# Patient Record
Sex: MALE | Race: WHITE | Employment: OTHER | URBAN - METROPOLITAN AREA
[De-identification: names, ages, dates, MRNs, and addresses within clinical notes are randomized per-mention and may not be internally consistent; named-entity substitution may affect disease eponyms.]

---

## 2017-02-14 ENCOUNTER — HOSPITAL ENCOUNTER (OUTPATIENT)
Dept: CT IMAGING | Age: 82
Discharge: HOME | End: 2017-02-14
Attending: FAMILY MEDICINE
Payer: MEDICARE

## 2017-02-14 DIAGNOSIS — R51.9 HA (HEADACHE): ICD-10-CM

## 2017-02-14 PROCEDURE — 70450 CT HEAD/BRAIN W/O DYE: CPT

## 2017-09-29 ENCOUNTER — HOSPITAL ENCOUNTER (OUTPATIENT)
Dept: PHYSICAL THERAPY | Age: 82
Discharge: HOME | End: 2017-09-29
Payer: MEDICARE

## 2017-09-29 PROCEDURE — 97161 PT EVAL LOW COMPLEX 20 MIN: CPT | Performed by: PHYSICAL THERAPIST

## 2017-09-29 PROCEDURE — G8978 MOBILITY CURRENT STATUS: HCPCS | Performed by: PHYSICAL THERAPIST

## 2017-09-29 PROCEDURE — 97110 THERAPEUTIC EXERCISES: CPT | Performed by: PHYSICAL THERAPIST

## 2017-09-29 PROCEDURE — G8979 MOBILITY GOAL STATUS: HCPCS | Performed by: PHYSICAL THERAPIST

## 2017-09-29 NOTE — PROGRESS NOTES
[facility], [address] [place] [address]  [phone] fax  Plan of Care/ Statement of Necessity for Physical Therapy Services    Patient name: Yessi Steele. Start of Care: 2017   Referral source: Laverne Silva MD : 1934    Medical Diagnosis: Pain in left hip [M25.552]  Bursitis of left hip [M70.72]   Onset Date:2 months    Treatment Diagnosis: L hip pain   Prior Hospitalization: see medical history Provider#: [id number]   Medications: Verified on Patient summary List    Comorbidities: a-fib, pacemaker, hearing impaired, OA, hx of frontal and parietal lobe clots   Prior Level of Function: independent ADLs, digs ditches, yard work, etc.      The Plan of Care and following information is based on the information from the initial evaluation. Assessment/ key information: Patient presents with signs/symptoms of L hip abductor tendinopathy and bursitis. Patient will continue to benefit from skilled PT services to modify and progress therapeutic interventions, address functional mobility deficits, address ROM deficits, address strength deficits, analyze and address soft tissue restrictions, analyze and cue movement patterns and analyze and modify body mechanics/ergonomics to attain remaining goals.     Evaluation Complexity History MEDIUM  Complexity : 1-2 comorbidities / personal factors will impact the outcome/ POC ; Examination LOW Complexity : 1-2 Standardized tests and measures addressing body structure, function, activity limitation and / or participation in recreation  ;Presentation LOW Complexity : Stable, uncomplicated  ;Clinical Decision Making MEDIUM Complexity : FOTO score of 26-74  Overall Complexity Rating: LOW   Problem List: pain affecting function, decrease ROM, decrease strength, impaired gait/ balance, decrease ADL/ functional abilitiies, decrease activity tolerance, decrease flexibility/ joint mobility and decrease transfer abilities   Treatment Plan may include any combination of the following: Therapeutic exercise, Therapeutic activities, Neuromuscular re-education, Physical agent/modality, Gait/balance training, Manual therapy, Aquatic therapy, Patient education, Self Care training, Functional mobility training, Home safety training and Stair training  Patient / Family readiness to learn indicated by: asking questions, trying to perform skills and interest  Persons(s) to be included in education: patient (P)  Barriers to Learning/Limitations: None  Patient Goal (s): \"strengthen hip to overcome pain  Patient Self Reported Health Status: good  Rehabilitation Potential: good    Short Term Goals: To be accomplished in 2 weeks:                         Patient will report compliance with HEP at least 1x/day to aid in rehabilitation program.                         Patient will report decrease in pain of less than 5/10 to aid in completion of ADLs     Long Term Goals: To be accomplished in 4 weeks:                         Patient will increase strength to 5/5 throughout B LEs to aid in completion of ADLs. Patient will report pain less than 1-2/10 average to aid in completion of ADLs. Patient ambulation for 10mins on level surface with no AD and pain free. Patient will improve FOTO to TBD points overall to demonstrate improvement in functional ability. Frequency / Duration: Patient to be seen 2 times per week for 4-6 weeks. Patient/ Caregiver education and instruction: Diagnosis, prognosis, self care, activity modification and exercises   [x]  Plan of care has been reviewed with PTA    G-Codes (GP)  Mobility   Current  CL= 60-79%   Goal  CK= 40-59%     The severity rating is based on clinical judgment and the FOTO score.     Certification Period: 9/28/2017 - 11/10/2017  Hector Aranda PT, DPT 9/29/2017 1:25 PM  _____________________________________________________________________  I certify that the above Therapy Services are being furnished while the patient is under my care. I agree with the treatment plan and certify that this therapy is necessary.     Physician's Signature:____________________  Date:__________Time:______    Please sign and return to [facility]  [phone] fax

## 2017-09-29 NOTE — PROGRESS NOTES
PT DAILY TREATMENT NOTE/HIP EVAL3-16    Patient Name: Izabela Gibbons. Date:2017  : 1934  [x]  Patient  Verified  Payor: James Perez / Plan: [place] MED[place] PART A & B / Product Type: Medicare /    In time:10:10  Out time:11:05  Total Treatment Time (min): 55  Total Timed Codes (min): 25  1:1 Treatment Time ( [address] only): 30   Visit #: 1 of 8    Treatment Area: Pain in left hip [M25.552]  Bursitis of left hip [M70.72]    SUBJECTIVE  Pain Level (0-10 scale): 5  []constant []intermittent []improving []worsening []no change since onset    Any medication changes, allergies to medications, adverse drug reactions, diagnosis change, or new procedure performed?: [x] No    [] Yes (see summary sheet for update)  Subjective functional status/changes:     Patient has CC of L hip pain for 2 months length of time. RENNY: none. Patient describes pain as sharp, intense; intermittent. No diurnal features. Denies numbness/tingling. Denies popping/clicking. Aggravating factors: walking, twisting, . Alleviating factors: sitting (sometimes). Denies red flags: SOB, chest pain, dizziness/lightheadedness, blurred/double vision, HA, chills/fevers, night sweats, change in bowel/bladder control, abdominal pain, difficulty swallowing, slurred speech, unexplained weight gain/loss. PMHx: a-fib, cerebral clots, pacemaker (10yrs), . Surgical Hx: microcraniotomy. Social Hx: 2 level home (does not need access), denies: alcohol & tobacco, work status: retired. PLOF: independent ADLs, digs ditches, yard work, etc. CLOF: same.        OBJECTIVE/EXAMINATION    30 min [x]Eval                  []Re-Eval       25 min Therapeutic Exercise:  [x] See flow sheet :   Rationale: increase ROM, increase strength and decrease pain to improve the patients ability to complete ADLs       With   [x] TE   [] TA   [] neuro   [] other: Patient Education: [x] Review HEP    [] Progressed/Changed HEP based on:   [] positioning   [] body mechanics   [] transfers [] heat/ice application    [] other:        Physical Therapy Evaluation- Hip    Posture:    Gait:  [] Normal    [x] Abnormal    [] Antalgic    [] NWB    Device:    Describe:         ROM/Strength        AROM                     PROM        Strength (1-5)  Hip Left Right Left Right Left Right   Flexion 0-120 0-120   4 4+   Extension 10 10   4 4+   Abduction 45 45   4 4   ER 45 45   4 4   IR 45 45   4 4   Knee Left Right Left Right Left Right   Extension     5 5   Flexion     5 5        Flexibility: [] Unable to assess at this time  Quadriceps:    (L) Tightness= [] WNL   [x] Min   [] Mod   [] Severe    (R) Tightness= [] WNL   [x] Min   [] Mod   [] Severe                                  Palpation  [x] Min  [] Mod  [] Severe    Location: greater trochanter   [] Min  [] Mod  [] Severe    Location:  [] Min  [] Mod  [] Severe    Location:    Optional Tests  Octavio/ Aleta Test: [x] Neg    [] Pos  Dat Test:  [x] Neg    [] Pos  Scouring Test: [x] Neg    [] Pos    Other tests/ comments: Pain decreased with abduction isometrics 30\" x4       Pain Level (0-10 scale) post treatment: 2    ASSESSMENT/Changes in Function: Patient presents with signs/symptoms of L hip abductor tendinopathy and bursitis. Patient will continue to benefit from skilled PT services to modify and progress therapeutic interventions, address functional mobility deficits, address ROM deficits, address strength deficits, analyze and address soft tissue restrictions, analyze and cue movement patterns and analyze and modify body mechanics/ergonomics to attain remaining goals.      [x]  See Plan of Care  []  See progress note/recertification  []  See Discharge Summary         Progress towards goals / Updated goals:  See POC    PLAN  []  Upgrade activities as tolerated     [x]  Continue plan of care  []  Update interventions per flow sheet       []  Discharge due to:_  []  Other:_      Tory Finley PT, DPT 9/29/2017  10:12 AM

## 2017-10-02 ENCOUNTER — HOSPITAL ENCOUNTER (OUTPATIENT)
Dept: PHYSICAL THERAPY | Age: 82
Discharge: HOME | End: 2017-10-02
Payer: MEDICARE

## 2017-10-02 PROCEDURE — 97110 THERAPEUTIC EXERCISES: CPT

## 2017-10-02 NOTE — PROGRESS NOTES
PT DAILY TREATMENT NOTE - [facility]     Patient Name: Mk Heredia. Date:10/2/2017  : 1934  [x]  Patient  Verified  Payor: Hilary Rios / Plan: [place] MEDICARE PART A & B / Product Type: Medicare /    In time:9:00  Out time:9:40  Total Treatment Time (min): 40  Total Timed Codes (min): 40  1:1 Treatment Time ( only): 40   Visit #: 2 of 8    Treatment Area: Pain in left hip [M25.552]  Bursitis of left hip [M70.72]    SUBJECTIVE  Pain Level (0-10 scale): 1/10  Any medication changes, allergies to medications, adverse drug reactions, diagnosis change, or new procedure performed?: [x] No    [] Yes (see summary sheet for update)  Subjective functional status/changes:   [] No changes reported  \"I'm doing good. \"    OBJECTIVE    40 min Therapeutic Exercise:  [] See flow sheet :   Rationale: increase ROM, increase strength, improve coordination and improve balance to improve the patients ability to perform ADls without difficulty. With   [] TE   [] TA   [] neuro   [] other: Patient Education: [x] Review HEP    [] Progressed/Changed HEP based on:   [] positioning   [] body mechanics   [] transfers   [] heat/ice application    [] other:      Other Objective/Functional Measures:      Pain Level (0-10 scale) post treatment: 0/10    ASSESSMENT/Changes in Function: Initiated ex. Per flow sheet. Pt reported feeling weakness with ex. But was able to perform all ex. Without increased pain or discomfort. Patient will continue to benefit from skilled PT services to modify and progress therapeutic interventions, address functional mobility deficits, address ROM deficits, address strength deficits, analyze and address soft tissue restrictions and analyze and cue movement patterns to attain remaining goals.      []  See Plan of Care  []  See progress note/recertification  []  See Discharge Summary         Progress towards goals / Updated goals:  Short Term Goals: To be accomplished in 2 weeks:                         AMMMIVC will report compliance with HEP at least 1x/day to aid in rehabilitation program.    Eval: Established    Current: MET. Pt reports compliance.                         Patient will report decrease in pain of less than 5/10 to aid in completion of ADLs      Long Term Goals: To be accomplished in 4 weeks:                         HRGCXIZ will increase strength to 5/5 throughout B LEs to aid in completion of ADLs.                        ORPTOCS will report pain less than 1-2/10 average to aid in completion of ADLs.                        LLETARU ambulation for 10mins on level surface with no AD and pain free.                         Patient will improve FOTO to TBD points overall to demonstrate improvement in functional ability.      PLAN  [x]  Upgrade activities as tolerated     [x]  Continue plan of care  []  Update interventions per flow sheet       []  Discharge due to:_  []  Other:_      Tamica Lagos, PTA 10/2/2017  9:06 AM    Future Appointments  Date Time Provider Joshua Carrera   10/4/2017 8:00 AM Tisha Hansen, PT [facility]   10/9/2017 9:00 AM Tisha Hansen, PT [facility]   10/11/2017 8:30 AM [facility]   10/16/2017 9:00 AM [facility]   10/18/2017 9:00 AM [facility]   10/23/2017 9:30 AM Tisha Hansen, PT [facility]

## 2017-10-04 ENCOUNTER — HOSPITAL ENCOUNTER (OUTPATIENT)
Dept: PHYSICAL THERAPY | Age: 82
Discharge: HOME | End: 2017-10-04
Payer: MEDICARE

## 2017-10-04 PROCEDURE — 97110 THERAPEUTIC EXERCISES: CPT

## 2017-10-04 PROCEDURE — 97112 NEUROMUSCULAR REEDUCATION: CPT

## 2017-10-04 NOTE — PROGRESS NOTES
PT DAILY TREATMENT NOTE - [facility]     Patient Name: Katie Crystal. Date:10/4/2017  : 1934  [x]  Patient  Verified  Payor: [place] MEDICARE / Plan: [place] MEDICARE PART A & B / Product Type: Medicare /    In time:800  Out time:835  Total Treatment Time (min): 35  Total Timed Codes (min): 35  1:1 Treatment Time ( only): 30   Visit #: 3 of 8    Treatment Area: Pain in left hip [M25.552]  Bursitis of left hip [M70.72]    SUBJECTIVE  Pain Level (0-10 scale): 3  Any medication changes, allergies to medications, adverse drug reactions, diagnosis change, or new procedure performed?: [x] No    [] Yes (see summary sheet for update)  Subjective functional status/changes:   [] No changes reported  I don't know, my back didn't hurt last time but it did after.      OBJECTIVE    Modality rationale:    Min Type Additional Details    [] Estim:  []Unatt       []IFC  []Premod                        []Other:  []w/ice   []w/heat  Position:  Location:    [] Estim: []Att    []TENS instruct  []NMES                    []Other:  []w/US   []w/ice   []w/heat  Position:  Location:    []  Traction: [] Cervical       []Lumbar                       [] Prone          []Supine                       []Intermittent   []Continuous Lbs:  [] before manual  [] after manual    []  Ultrasound: []Continuous   [] Pulsed                           []1MHz   []3MHz W/cm2:  Location:    []  Iontophoresis with dexamethasone         Location: [] Take home patch   [] In clinic    []  Ice     []  heat  []  Ice massage  []  Laser   []  Anodyne Position:  Location:    []  Laser with stim  []  Other:  Position:  Location:    []  Vasopneumatic Device Pressure:       [] lo [] med [] hi   Temperature: [] lo [] med [] hi   [] Skin assessment post-treatment:  []intact []redness- no adverse reaction    []redness  adverse reaction:     25 min Therapeutic Exercise:  [x] See flow sheet :   Rationale: increase ROM and increase strength to improve the patients ability to improve ease of mobility     10 min Neuromuscular Re-education:  [x]  See flow sheet :pilates chair   Rationale: increase strength, improve coordination and increase proprioception  to improve the patients ability to improve hip stability     With   [] TE   [] TA   [] neuro   [] other: Patient Education: [x] Review HEP    [] Progressed/Changed HEP based on:   [] positioning   [] body mechanics   [] transfers   [] heat/ice application    [] other:      Other Objective/Functional Measures: added chair leg press x 3     Pain Level (0-10 scale) post treatment: 2-3    ASSESSMENT/Changes in Function: Good tolerance to progression of strengthening. Patient will continue to benefit from skilled PT services to modify and progress therapeutic interventions, address functional mobility deficits, address ROM deficits, address strength deficits, analyze and address soft tissue restrictions, analyze and cue movement patterns, analyze and modify body mechanics/ergonomics, assess and modify postural abnormalities, address imbalance/dizziness and instruct in home and community integration to attain remaining goals. []  See Plan of Care  []  See progress note/recertification  []  See Discharge Summary         Progress towards goals / Updated goals:  Short Term Goals: To be accomplished in 2 weeks:  Goal: Patient will report compliance with HEP at least 1x/day to aid in rehabilitation program.  Status at last note/certification: Established  Current status: MET. Pt reports compliance. Goal: Patient will report decrease in pain of less than 5/10 to aid in completion of ADLs  Status at last note/certification: 1/29  Current status:    Long Term Goals: To be accomplished in 4 weeks:  Goal: Patient will increase strength to 5/5 throughout B LEs to aid in completion of ADLs. Status at last note/certification: 4/5   Current status:    Goal: Patient will report pain less than 1-2/10 average to aid in completion of ADLs.   Status at last note/certification: Best 3/14, Worst 8/10  Current status:    Goal: Patient ambulation for 10mins on level surface with no AD and pain free. Status at last note/certification: Painful, Left LE collapses   Current status:    Goal: Patient will improve FOTO to TBD points overall to demonstrate improvement in functional ability.    Status at last note/certification: 37; Goal 59  Current status:      PLAN  [x]  Upgrade activities as tolerated     [x]  Continue plan of care  []  Update interventions per flow sheet       []  Discharge due to:_  []  Other:_      Melany Perez, ZAC 10/4/2017  7:04 AM    Future Appointments  Date Time Provider Joshua Carrera   10/4/2017 8:00 AM Melany Perez, PT [facility]   10/9/2017 9:00 AM Melany Perez, ZAC [facility]   10/11/2017 8:30  [address] [facility]   10/16/2017 9:00  [address] [facility]   10/18/2017 9:00 AM Miles Martínez [facility]   10/23/2017 9:30 AM Melany Perez, ZAC Guzman

## 2017-10-09 ENCOUNTER — HOSPITAL ENCOUNTER (OUTPATIENT)
Dept: PHYSICAL THERAPY | Age: 82
Discharge: HOME | End: 2017-10-09
Payer: MEDICARE

## 2017-10-09 PROCEDURE — 97110 THERAPEUTIC EXERCISES: CPT

## 2017-10-09 NOTE — PROGRESS NOTES
PT DAILY TREATMENT NOTE - [facility]     Patient Name: Tierra Ortega. Date:10/9/2017  : 1934  [x]  Patient  Verified  Payor: Yara Roman / Plan: [place] MEDICARE PART A & B / Product Type: Medicare /    In time:9:00  Out time:9:35  Total Treatment Time (min): 35  Total Timed Codes (min): 35  1:1 Treatment Time ( only): 35   Visit #: 4 of 8    Treatment Area: Pain in left hip [M25.552]  Bursitis of left hip [M70.72]    SUBJECTIVE  Pain Level (0-10 scale): 2/10  Any medication changes, allergies to medications, adverse drug reactions, diagnosis change, or new procedure performed?: [x] No    [] Yes (see summary sheet for update)  Subjective functional status/changes:   [] No changes reported  \"I just had a sharp stinging pain over the weekend, but I'm okay. \"    OBJECTIVE      35 min Therapeutic Exercise:  [] See flow sheet :   Rationale: increase ROM and increase strength to improve the patients ability to perform ADLs without difficulty. With   [] TE   [] TA   [] neuro   [] other: Patient Education: [x] Review HEP    [] Progressed/Changed HEP based on:   [] positioning   [] body mechanics   [] transfers   [] heat/ice application    [] other:      Other Objective/Functional Measures:      Pain Level (0-10 scale) post treatment: 0/10    ASSESSMENT/Changes in Function: Progressed ex. Per flow sheet. Pt reported no increase in pain during or after therapy. Patient will continue to benefit from skilled PT services to modify and progress therapeutic interventions, address functional mobility deficits, address ROM deficits, address strength deficits and analyze and address soft tissue restrictions to attain remaining goals.      []  See Plan of Care  []  See progress note/recertification  []  See Discharge Summary         Progress towards goals / Updated goals:    Short Term Goals: To be accomplished in 2 weeks:  Goal: Patient will report compliance with HEP at least 1x/day to aid in rehabilitation program.  Status at last note/certification: Established  Current status: MET. Pt reports compliance. Goal: Patient will report decrease in pain of less than 5/10 to aid in completion of ADLs  Status at last note/certification: 9/44  Current status:    Long Term Goals: To be accomplished in 4 weeks:  Goal: Patient will increase strength to 5/5 throughout B LEs to aid in completion of ADLs. Status at last note/certification: 4/5   Current status:    Goal: Patient will report pain less than 1-2/10 average to aid in completion of ADLs. Status at last note/certification: Best 0/18, Worst 8/10  Current status:    Goal: Patient ambulation for 10mins on level surface with no AD and pain free. Status at last note/certification: Painful, Left LE collapses   Current status:    Goal: Patient will improve FOTO to TBD points overall to demonstrate improvement in functional ability.    Status at last note/certification: 37; Goal 59  Current status:    PLAN  [x]  Upgrade activities as tolerated     [x]  Continue plan of care  []  Update interventions per flow sheet       []  Discharge due to:_  []  Other:_      Cristiana Calle, BRIAN 10/9/2017  9:29 AM    Future Appointments  Date Time Provider Joshua Carrera   10/11/2017 8:30  [address] [facility]   10/16/2017 9:00  [address] [facility]   10/18/2017 9:00 AM Ledon Barton [facility]   10/23/2017 9:30 AM Valentine Gallo, PT [facility]ad Mcknight

## 2017-10-11 ENCOUNTER — HOSPITAL ENCOUNTER (OUTPATIENT)
Dept: PHYSICAL THERAPY | Age: 82
Discharge: HOME | End: 2017-10-11
Payer: MEDICARE

## 2017-10-11 PROCEDURE — 97110 THERAPEUTIC EXERCISES: CPT

## 2017-10-11 NOTE — PROGRESS NOTES
PT DAILY TREATMENT NOTE - [facility] 3-16    Patient Name: Toni Pozo. Date:10/11/2017  : 1934  [x]  Patient  Verified  Payor: Isac Jacobo / Plan: [place] MED[place] & B / Product Type: Medicare /    In time:8:30  Out time: 9:10  Total Treatment Time (min): 40  Total Timed Codes (min): 40  1:1 Treatment Time ( only): 23   Visit #: 5 of 8    Treatment Area: Pain in left hip [M25.552]  Bursitis of left hip [M70.72]    SUBJECTIVE  Pain Level (0-10 scale): 1  Any medication changes, allergies to medications, adverse drug reactions, diagnosis change, or new procedure performed?: [x] No    [] Yes (see summary sheet for update)  Subjective functional status/changes:   [] No changes reported  I'm feeling better. I haven't had any episodes of sharp pain since     OBJECTIVE    40 min Therapeutic Exercise:  [] See flow sheet :   Rationale: increase ROM and increase strength to improve the patients ability to walk with decreased pain, reduce LE buckling    With   [] TE   [] TA   [] neuro   [] other: Patient Education: [x] Review HEP    [] Progressed/Changed HEP based on:   [] positioning   [] body mechanics   [] transfers   [] heat/ice application    [] other:      Other Objective/Functional Measures:   Progressed to BTB and changed to random on bike for strength   Has had less frequent episodes of sharp hip pain that used to result in near buckling of L LE. Pain Level (0-10 scale) post treatment:  1    ASSESSMENT/Changes in Function: Able to progress reps and resistance for strength.  Walking and stair negotiation are becoming easier, reciprocal on stairs after a few steps    Patient will continue to benefit from skilled PT services to modify and progress therapeutic interventions, address functional mobility deficits, address ROM deficits, address strength deficits, analyze and address soft tissue restrictions, analyze and cue movement patterns, analyze and modify body mechanics/ergonomics, assess and modify postural abnormalities, address imbalance/dizziness and instruct in home and community integration to attain remaining goals. []  See Plan of Care  []  See progress note/recertification  []  See Discharge Summary         Progress towards goals / Updated goals:  Goal: Patient will report compliance with HEP at least 1x/day to aid in rehabilitation program.  Status at last note/certification: Established  Current status: MET. Pt reports compliance. Goal: Patient will report decrease in pain of less than 5/10 to aid in completion of ADLs  Status at last note/certification: 9/29  Current status:    Long Term Goals: To be accomplished in 4 weeks:  Goal: Patient will increase strength to 5/5 throughout B LEs to aid in completion of ADLs. Status at last note/certification: 4/5   Current status:    Goal: Patient will report pain less than 1-2/10 average to aid in completion of ADLs. Status at last note/certification: Best 1/89, Worst 8/10  Current status:  worst: 5-6/10, Best:  1-2/10 an \"awareness\" not pain  Progressing  Goal: Patient ambulation for 10mins on level surface with no AD and pain free. Status at last note/certification: Painful, Left LE collapses   Current status:  can walk to [place] with no difficulty, a mile. But can walk up to 3 miles  Goal: Patient will improve FOTO to TBD points overall to demonstrate improvement in functional ability.    Status at last note/certification: 37; Goal 59  Current status:      PLAN  [x]  Upgrade activities as tolerated     []  Continue plan of care  []  Update interventions per flow sheet       []  Discharge due to:_  []  Other:_      Jann Mackay PTA 10/11/2017  8:43 AM

## 2017-10-16 ENCOUNTER — HOSPITAL ENCOUNTER (OUTPATIENT)
Dept: PHYSICAL THERAPY | Age: 82
Discharge: HOME | End: 2017-10-16
Payer: MEDICARE

## 2017-10-16 PROCEDURE — 97110 THERAPEUTIC EXERCISES: CPT

## 2017-10-16 NOTE — PROGRESS NOTES
PT DAILY TREATMENT NOTE - [facility] 3-16    Patient Name: Neo Ruiz. Date:10/16/2017  : 1934  [x]  Patient  Verified  Payor: Nancy Carrillo / Plan: [place] MEDICARE PART A & B / Product Type: Medicare /    In time:8:45  Out time: 9;25  Total Treatment Time (min): 40  Total Timed Codes (min): 40  1:1 Treatment Time ( only): 15   Visit #: 6 of     Treatment Area: Pain in left hip [M25.552]  Bursitis of left hip [M70.72]    SUBJECTIVE  Pain Level (0-10 scale): 0  Any medication changes, allergies to medications, adverse drug reactions, diagnosis change, or new procedure performed?: [x] No    [] Yes (see summary sheet for update)  Subjective functional status/changes:   [] No changes reported   Brenda OK this morning    OBJECTIVE    40 min Therapeutic Exercise:  [] See flow sheet :   Rationale: increase ROM and increase strength to improve the patients ability to walk with improved ease      With   [] TE   [] TA   [] neuro   [] other: Patient Education: [x] Review HEP    [] Progressed/Changed HEP based on:   [] positioning   [] body mechanics   [] transfers   [] heat/ice application    [] other:      Other Objective/Functional Measures:      Pain Level (0-10 scale) post treatment:  0    ASSESSMENT/Changes in Function: FOTO score increased indicating functional gains. Patient will continue to benefit from skilled PT services to modify and progress therapeutic interventions, address functional mobility deficits, address ROM deficits, address strength deficits, analyze and address soft tissue restrictions, analyze and cue movement patterns, analyze and modify body mechanics/ergonomics, assess and modify postural abnormalities, address imbalance/dizziness and instruct in home and community integration to attain remaining goals.      []  See Plan of Care  []  See progress note/recertification  []  See Discharge Summary         Progress towards goals / Updated goals:  Goal: Patient will report compliance with HEP at least 1x/day to aid in rehabilitation program.  Status at last note/certification: Established  Current status: MET. Pt reports compliance. Goal: Patient will report decrease in pain of less than 5/10 to aid in completion of ADLs  Status at last note/certification: 5/96  Current status:    Long Term Goals: To be accomplished in 4 weeks:  Goal: Patient will increase strength to 5/5 throughout B LEs to aid in completion of ADLs. Status at last note/certification: 4/5   Current status:    Goal: Patient will report pain less than 1-2/10 average to aid in completion of ADLs. Status at last note/certification: Best 4/13, Worst 8/10  Current status:  worst: 5-6/10, Best:  1-2/10 an \"awareness\" not pain  Progressing  Goal: Patient ambulation for 10mins on level surface with no AD and pain free. Status at last note/certification: Painful, Left LE collapses   Current status:  can walk to [place] with no difficulty, a mile. But can walk up to 3 miles  Goal: Patient will improve FOTO to TBD points overall to demonstrate improvement in functional ability.    Status at last note/certification: 37; Goal 59  Current status:  FOTO 62, goal Met    PLAN  [x]  Upgrade activities as tolerated     []  Continue plan of care  []  Update interventions per flow sheet       []  Discharge due to:_  []  Other:_      Silas Gan PTA 10/16/2017  8:50 AM

## 2017-10-18 ENCOUNTER — HOSPITAL ENCOUNTER (OUTPATIENT)
Dept: PHYSICAL THERAPY | Age: 82
Discharge: HOME | End: 2017-10-18
Payer: MEDICARE

## 2017-10-18 PROCEDURE — 97110 THERAPEUTIC EXERCISES: CPT

## 2017-10-23 ENCOUNTER — HOSPITAL ENCOUNTER (OUTPATIENT)
Dept: PHYSICAL THERAPY | Age: 82
Discharge: HOME | End: 2017-10-23
Payer: MEDICARE

## 2017-10-23 PROCEDURE — G8980 MOBILITY D/C STATUS: HCPCS

## 2017-10-23 PROCEDURE — 97112 NEUROMUSCULAR REEDUCATION: CPT

## 2017-10-23 PROCEDURE — G8979 MOBILITY GOAL STATUS: HCPCS

## 2017-10-23 NOTE — PROGRESS NOTES
PT DISCHARGE DAILY NOTE AND [id number]    Date:10/23/2017  Patient name: Jelena Broussard. Start of Care: 2017   Referral source: Georgi Mohs, MD : 1934                         Medical Diagnosis: Pain in left hip [M25.552]  Bursitis of left hip [M70.72] Onset Date:2 months                         Treatment Diagnosis: L hip pain   Prior Hospitalization: see medical history Provider#: [id number]   Medications: Verified on Patient summary List    Comorbidities: a-fib, pacemaker, hearing impaired, OA, hx of frontal and parietal lobe clots   Prior Level of Function: independent ADLs, digs ditches, yard work, etc.    Visits from [facility] of Care: 8    Missed Visits: 0    Reporting Period : 17 to 10/23/17    [x]  Patient  Verified  Payor: [place] MEDICARE / Plan: [place] MEDICARE PART A & B / Product Type: Medicare /    In time:924  Out time:950  Total Treatment Time (min): 26  Total Timed Codes (min): 26  1:1 Treatment Time ( [address] only): 26   Visit #: 8 of 8-12    SUBJECTIVE  Pain Level (0-10 scale): 4-5  Any medication changes, allergies to medications, adverse drug reactions, diagnosis change, or new procedure performed?: [x] No    [] Yes (see summary sheet for update)  Subjective functional status/changes:   [] No changes reported  I almost had to bring my stick in but I didn't. I don't think this has been helping.     OBJECTIVE    Modality rationale: Patient declined   Min Type Additional Details    [] Estim:  []Unatt       []IFC  []Premod                        []Other:  []w/ice   []w/heat  Position:  Location:    [] Estim: []Att    []TENS instruct  []NMES                    []Other:  []w/US   []w/ice   []w/heat  Position:  Location:    []  Traction: [] Cervical       []Lumbar                       [] Prone          []Supine                       []Intermittent   []Continuous Lbs:  [] before manual  [] after manual    []  Ultrasound: []Continuous   [] Pulsed                           []1MHz   []3MHz W/cm2:  Location:    []  Iontophoresis with dexamethasone         Location: [] Take home patch   [] In clinic    []  Ice     []  heat  []  Ice massage  []  Laser   []  Anodyne Position:  Location:    []  Laser with stim  []  Other:  Position:  Location:    []  Vasopneumatic Device Pressure:       [] lo [] med [] hi   Temperature: [] lo [] med [] hi   [] Skin assessment post-treatment:  []intact []redness- no adverse reaction    []redness  adverse reaction:       26 min Neuromuscular Re-education:  [x]  See flow sheet :   Rationale: increase strength, improve coordination, improve balance and increase proprioception  to improve the patients ability to improve LE stability with weight bearing       With   [] TE   [] TA   [] neuro   [] other: Patient Education: [x] Review HEP    [] Progressed/Changed HEP based on:   [] positioning   [] body mechanics   [] transfers   [] heat/ice application    [] other:      Other Objective/Functional Measures: reviewed HEP     Pain Level (0-10 scale) post treatment: 3    Summary of Care:  Goal: Patient will report compliance with HEP at least 1x/day to aid in rehabilitation program.  Status at last note/certification: Established  Current status: Met  Goal: Patient will report decrease in pain of less than 5/10 to aid in completion of ADLs  Status at last note/certification: 0/16  Current status:  Progressing, 7/10  Long Term Goals: To be accomplished in 4 weeks:  Goal: Patient will increase strength to 5/5 throughout B LEs to aid in completion of ADLs. Status at last note/certification: 4/5   Current status:  Progressing, 4+/5 to 5/5  Goal: Patient will report pain less than 1-2/10 average to aid in completion of ADLs. Status at last note/certification: Best 7/05, Worst 8/10  Current status:  Not met, worst 7/10  Goal: Patient ambulation for 10mins on level surface with no AD and pain free.   Status at last note/certification: Painful, Left LE collapses   Current status:  Progressing, able to walk up to three miles but not pain-free  Goal: Patient will improve FOTO to TBD points overall to demonstrate improvement in functional ability. Status at last note/certification: 37; Goal 59  Current status: Met    ASSESSMENT/Changes in Function: Patient has consistently attended therapy for Left hip pain. Patient reports that on average he can walk desired distances, and he does demonstrate improved LE strength. Mr. Lisa Auguste reports, however, that his pain continues to be elevated, and he see limited functional improvement. Recommend discharge to [facility] and follow up with MD for further necessary treatment and testing. G-Codes (GP)  Mobility   [id number] Goal  CK= 40-59%  D/C  CJ= 20-39%    The severity rating is based on clinical judgment and the FOTO score.       Thank you for this referral!     PLAN  [x]Discontinue therapy: [x]Patient has reached or is progressing toward set goals      []Patient is non-compliant or has abdicated      []Due to lack of appreciable progress towards set [facility],  10/23/2017  9:29 AM

## 2018-04-19 ENCOUNTER — HOSPITAL ENCOUNTER (OUTPATIENT)
Dept: CT IMAGING | Age: 83
Discharge: HOME | End: 2018-04-19
Attending: FAMILY MEDICINE
Payer: MEDICARE

## 2018-04-19 DIAGNOSIS — R51.9 HEADACHE: ICD-10-CM
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
## 2018-04-19 DIAGNOSIS — R20.8 DECREASED SENSATION: ICD-10-CM

## 2018-04-19 PROCEDURE — 70450 CT HEAD/BRAIN W/O DYE: CPT

## 2018-04-30 ENCOUNTER — APPOINTMENT (OUTPATIENT)
Dept: PHYSICAL THERAPY | Age: 83
End: 2018-04-30

## 2018-04-30 ENCOUNTER — HOSPITAL ENCOUNTER (OUTPATIENT)
Dept: PHYSICAL THERAPY | Age: 83
Discharge: HOME | End: 2018-04-30
Payer: MEDICARE

## 2018-04-30 PROCEDURE — G8978 MOBILITY CURRENT STATUS: HCPCS

## 2018-04-30 PROCEDURE — G8979 MOBILITY GOAL STATUS: HCPCS

## 2018-04-30 PROCEDURE — 97110 THERAPEUTIC EXERCISES: CPT

## 2018-04-30 PROCEDURE — 97162 PT EVAL MOD COMPLEX 30 MIN: CPT

## 2018-04-30 NOTE — PROGRESS NOTES
PT DAILY TREATMENT NOTE - [facility] 3-16    Patient Name: Alejandra Andujar. Date:2018  : 1934  [x]  Patient  Verified  Payor: Jer Art / Plan: [place] MEDICARE PART A & B / Product Type: Medicare /    In time:9:05  Out time:9:50  Total Treatment Time (min): 45  Total Timed Codes (min): 8  1:1 Treatment Time ( only): 39   Visit #: 1 of 12    Treatment Area: Unsteadiness on feet [R26.81]    SUBJECTIVE  Pain Level (0-10 scale): 0/10  Any medication changes, allergies to medications, adverse drug reactions, diagnosis change, or new procedure performed?: [x] No    [] Yes (see summary sheet for update)  Subjective functional status/changes:   [x] See paper initial evaluation form in patient chart      OBJECTIVE    37 min [x]Eval                  []Re-Eval       8 min Therapeutic Exercise:  [] See flow sheet : HEP given and reviewed with Pt   Rationale: increase strength and improve balance to improve the patients ability to amb without LOB or falls    With   [] TE   [] TA   [x] neuro   [] other: Patient Education: [x] Review HEP    [] Progressed/Changed HEP based on:   [] positioning   [] body mechanics   [] transfers   [] heat/ice application    [] other:      Other Objective/Functional Measures: FOTO 66 pts     Pain Level (0-10 scale) post treatment: 0/10    ASSESSMENT/Changes in Function:     Patient will continue to benefit from skilled PT services to address functional mobility deficits, address ROM deficits, address strength deficits, analyze and cue movement patterns, analyze and modify body mechanics/ergonomics, assess and modify postural abnormalities, address imbalance/dizziness and instruct in home and community integration to attain remaining goals.      [x]  See Plan of Care         PLAN  []  Upgrade activities as tolerated     [x]  Continue plan of care  []  Update interventions per flow sheet       []  Discharge due to:_  []  Other:_      Liban Brown, PT 2018  11:07 AM

## 2018-04-30 NOTE — PROGRESS NOTES
[facility], [address] [place] [address]  [phone] fax  Plan of Care/ Statement of Necessity for Physical Therapy Services    Patient name: Jose F Bloom Start of Care: 2018   Referral source: Natalia Mercedes MD : 1934    Medical Diagnosis: Unsteadiness on feet [R26.81]   Onset Date:18    Treatment Diagnosis: Unsteady Gait; Imbalance   Prior Hospitalization: see medical history Provider#: [id number]   Medications: Verified on Patient summary List    Comorbidities: Angina; Arthritis; Back pain; Atrial Fibrillation; Pacemaker; hx of B TKR; hx of B shoulder surgeries   Prior Level of Function: Lives in [facility] - stays on 1st floor mainly with spouse; functionally independent; enjoyed sailing; amb with frequent falls without use of A.D. The Plan of Care and following information is based on the information from the initial evaluation. Assessment/ key information: Pt is a 80 y.o. male who presents with c/o impaired balance and frequent falls that has progressed from 1x/month 3-4 yrs ago to 1x/wk in the past 6 months. Pt has a noticed a gradual decline in standing balance, staggering from left to right with gait that does not change or worsen throughout the course of the day. Pt typically does not use an A.D but has a walking stick and RW. Pt continues to perform all household chores and yardwork, staggering and pausing during activities, but has stopped sailing - a favorite pastime due to balance issues. Upon exam, Pt exhibited normal ROM in B UE/LEs and myotomal strength in B UE/LEs. Pt exhibited increased sway and minor LOB with static balance activities and required CGA to maintain balance with NBOS activities, especially with eyes closed. Pt scored 19/24 on DGI, exhibiting increased staggering and sway with weight shifting on stairs, navigating around obstacles, and amb with head turns.   Pt would benefit from skilled PT to address above deficits to improve Pt's function and ability to return to more active lifestyle without falls or LOB. Evaluation Complexity History LOW Complexity : Zero comorbidities / personal factors that will impact the outcome / POC; Examination MEDIUM Complexity : 3 Standardized tests and measures addressing body structure, function, activity limitation and / or participation in recreation  ;Presentation MEDIUM Complexity : Evolving with changing characteristics  ; Clinical Decision Making MEDIUM Complexity : FOTO score of 26-74  Overall Complexity Rating: MEDIUM  Problem List: decrease strength, impaired gait/ balance, decrease ADL/ functional abilitiies, decrease activity tolerance and decrease transfer abilities   Treatment Plan may include any combination of the following: Therapeutic exercise, Therapeutic activities, Neuromuscular re-education, Physical agent/modality, Gait/balance training, Manual therapy and Patient education  Patient / Family readiness to learn indicated by: asking questions, trying to perform skills and interest  Persons(s) to be included in education: patient (P)  Barriers to Learning/Limitations: None  Patient Goal (s): Improve my balance to not fall.   Patient Self Reported Health Status: good  Rehabilitation Potential: good    Short Term Goals: To be accomplished in 1 weeks:  Goal: Pt to be compliant with initial HEP to improve static and dynamic standing balance to reduce fall risk. Status at last note/certification: Established and reviewed with Pt  Long Term Goals: To be accomplished in 6 weeks:  Goal: Pt to perform Romberg EO/EC on foam x 30 sec without LOB to show improved static balance. Status at last note/certification: increased sway, minor LOB on floor x 30 sec  Goal: Pt to perform Sharpened Romberg EO x 30 sec without LOB to navigate in tight spaces without falls or staggering.   Status at last note/certification: unable to perform without LOB  Goal: Pt to increase DGI score to 23/24 to show improved dynamic standing balance to prevent falls with community amb. Status at last note/certification: DGI 85/56  Goal: Pt to report no falls x 4 wks to show increased safety with household and community level amb. Status at last note/certification: falls 1x/wk  Goal: Pt to report FOTO score of 69 pts to show improved function. Status at last note/certification: FOTO 66 pts    Frequency / Duration: Patient to be seen 2 times per week for 6 weeks. Patient/ Caregiver education and instruction: Diagnosis, prognosis, exercises   [x]  Plan of care has been reviewed with PTA    G-Codes (GP)  Mobility   Current  CJ= 20-39%   Goal  CJ= 20-39%    The severity rating is based on clinical judgment and the FOTO score. Certification Period: 4/30/18 - 5/29/18  Jeny Brown, PT 4/30/2018 11:10 AM  _____________________________________________________________________  I certify that the above Therapy Services are being furnished while the patient is under my care. I agree with the treatment plan and certify that this therapy is necessary.     Physician's Signature:____________________  Date:__________Time:______    Please sign and return to [facility] [address]  [phone] fax

## 2018-05-03 ENCOUNTER — HOSPITAL ENCOUNTER (OUTPATIENT)
Dept: PHYSICAL THERAPY | Age: 83
Discharge: HOME | End: 2018-05-03
Payer: MEDICARE

## 2018-05-03 PROCEDURE — 97112 NEUROMUSCULAR REEDUCATION: CPT

## 2018-05-03 NOTE — PROGRESS NOTES
PT DAILY TREATMENT NOTE - [facility]     Patient Name: Dora Cabezas. Date:5/3/2018  : 1934  [x]  Patient  Verified  Payor: [place] MEDICARE / Plan: [place] MEDICARE PART A & B / Product Type: Medicare /    In time: 0;56  Out time:9:00. Total Treatment Time (min): 30  Total Timed Codes (min): 30  1:1 Treatment Time ([facility] only): 30   Visit #: 2 of 12    Treatment Area: Unsteadiness on feet [R26.81]    SUBJECTIVE  Pain Level (0-10 scale): 0  Any medication changes, allergies to medications, adverse drug reactions, diagnosis change, or new procedure performed?: [x] No    [] Yes (see summary sheet for update)  Subjective functional status/changes:   [] No changes reported  I'm going to stick with PT this time around. I gave up last time the MD sent me here. And I've been doing my ex's at home    OBJECTIVE     30 min Neuromuscular Re-education:  []  See flow sheet :   Rationale: increase strength, improve coordination, improve balance and increase proprioception  to improve the patients ability to increase stability for gait, reduce fall risk            With   [] TE   [] TA   [] neuro   [] other: Patient Education: [x] Review HEP    [] Progressed/Changed HEP based on:   [] positioning   [] body mechanics   [] transfers   [] heat/ice application    [] other:      Other Objective/Functional Measures: initiated ex program     Pain Level (0-10 scale) post treatment:  0    ASSESSMENT/Changes in Function:  First follow-up after eval.  Pt demo instability with rhomberg and progressively more challenging with MSR.  Trunk sway utilized to stabilize and occassional use of finger tip touch on bars      Patient will continue to benefit from skilled PT services to modify and progress therapeutic interventions, address functional mobility deficits, address ROM deficits, address strength deficits, analyze and address soft tissue restrictions, analyze and cue movement patterns, analyze and modify body mechanics/ergonomics, assess and modify postural abnormalities, address imbalance/dizziness and instruct in home and community integration to attain remaining goals. []  See Plan of Care  []  See progress note/recertification  []  See Discharge Summary         Progress towards goals / Updated goals:  Goal: Pt to be compliant with initial HEP to improve static and dynamic standing balance to reduce fall risk. Status at last note/certification: Established and reviewed with Pt  Long Term Goals: To be accomplished in 6 weeks:  Goal: Pt to perform Romberg EO/EC on foam x 30 sec without LOB to show improved static balance. Status at last note/certification: increased sway, minor LOB on floor x 30 sec  Goal: Pt to perform Sharpened Romberg EO x 30 sec without LOB to navigate in tight spaces without falls or staggering. Status at last note/certification: unable to perform without LOB  Goal: Pt to increase DGI score to 23/24 to show improved dynamic standing balance to prevent falls with community amb. Status at last note/certification: DGI 25/37  Goal: Pt to report no falls x 4 wks to show increased safety with household and community level amb. Status at last note/certification: falls 1x/wk  Goal: Pt to report FOTO score of 69 pts to show improved function.   Status at last note/certification: FOTO 66 pts    PLAN  [x]  Upgrade activities as tolerated     []  Continue plan of care  []  Update interventions per flow sheet       []  Discharge due to:_  []  Other:_      Marielena Jimenez PTA 5/3/2018  9:16 AM    Future Appointments  Date Time Provider Joshua Carrera   5/7/2018 8:30  [address] [facility]   5/10/2018 3:00 PM Reyes Palin, PTA [facility]   5/14/2018 8:30 AM Angelia Yun [facility]   5/17/2018 8:30  [address] [facility]   5[facility]21/2018 8:30 AM Pete Brown PT [facility]   5/24/2018 8:30  [address] [facility]   5/29/2018 8:30  [address] [facility]   5/31/2018 8:30 AM [address] [facility]

## 2018-05-07 ENCOUNTER — HOSPITAL ENCOUNTER (OUTPATIENT)
Dept: PHYSICAL THERAPY | Age: 83
Discharge: HOME | End: 2018-05-07
Payer: MEDICARE

## 2018-05-07 PROCEDURE — 97112 NEUROMUSCULAR REEDUCATION: CPT

## 2018-05-07 NOTE — PROGRESS NOTES
PT DAILY TREATMENT NOTE - [facility]     Patient Name: Brayan Ling. Date:2018  : 1934  [x]  Patient  Verified  Payor: [place] MED[place]ARE / Plan: [place] MEDICARE PART A & B / Product Type: Medicare /    In time:8:25  Out time: 8:55  Total Treatment Time (min): 30  Total Timed Codes (min): 30  1:1 Treatment Time ([facility] only): 30   Visit #: 3 of 12    Treatment Area: Unsteadiness on feet [R26.81]    SUBJECTIVE  Pain Level (0-10 scale):  0  Any medication changes, allergies to medications, adverse drug reactions, diagnosis change, or new procedure performed?: [x] No    [] Yes (see summary sheet for update)  Subjective functional status/changes:   [] No changes reported  I've been working on my ex's at home 2 x daily In my hallway. OBJECTIVE     30 min Neuromuscular Re-education:  []  See flow sheet :   Rationale: increase strength and improve coordination  to improve the patients ability to reduce fall risk, increase stability for gait and functional tasks         With   [] TE   [] TA   [] neuro   [] other: Patient Education: [x] Review HEP    [] Progressed/Changed HEP based on:   [] positioning   [] body mechanics   [] transfers   [] heat/ice application    [] other:      Other Objective/Functional Measures:   Progressed Rhomberg to foam EO/EC     Pain Level (0-10 scale) post treatment:  0    ASSESSMENT/Changes in Function: demo significant instability with progression to foam.  Required UE balance checks    Patient will continue to benefit from skilled PT services to modify and progress therapeutic interventions, address functional mobility deficits, address ROM deficits, address strength deficits, analyze and address soft tissue restrictions, analyze and cue movement patterns, analyze and modify body mechanics/ergonomics, assess and modify postural abnormalities and address imbalance/dizziness to attain remaining goals.      []  See Plan of Care  []  See progress note/recertification  []  See Discharge Summary         Progress towards goals / Updated goals:  Goal: Pt to be compliant with initial HEP to improve static and dynamic standing balance to reduce fall risk. Status at last note/certification: Established and reviewed with Pt  Pt reports compliance 2 x daily  Long Term Goals: To be accomplished in 6 weeks:  Goal: Pt to perform Romberg EO/EC on foam x 30 sec without LOB to show improved static balance. Status at last note/certification: increased sway, minor LOB on floor x 30 sec  Not Met: initiated foam Rhomberg today. Marked sway and UE balance checks during 30 second trial.  Goal: Pt to perform Sharpened Romberg EO x 30 sec without LOB to navigate in tight spaces without falls or staggering. Status at last note/certification: unable to perform without LOB  Goal: Pt to increase DGI score to 23/24 to show improved dynamic standing balance to prevent falls with community amb. Status at last note/certification: DGI 95/33  Goal: Pt to report no falls x 4 wks to show increased safety with household and community level amb. Status at last note/certification: falls 1x/wk  Goal: Pt to report FOTO score of 69 pts to show improved function.   Status at last note/certification: FOTO 66 pts    PLAN  [x]  Upgrade activities as tolerated     []  Continue plan of care  []  Update interventions per flow sheet       []  Discharge due to:_  []  Other:_      Tonio Ann PTA 5/7/2018  8:28 AM    Future Appointments  Date Time Provider Joshua Carrera   5/7/2018 8:30  [address] [facility]   5/10/2018 3:00 PM Sheridan Ash PTA [facility]   5/14/2018 8:30 AM Graig Pal [facility]   5/17/2018 8:30  [address] [facility]   5/21/2018 8:30 AM Jeny Brown PT [facility]   5/24/2018 8:30 AM Graig Pal [facility]   5/29/2018 8:30  [address] [facility]   5/31/2018 8:30 AM Cristymine Cerono Grime [facility]

## 2018-05-10 ENCOUNTER — HOSPITAL ENCOUNTER (OUTPATIENT)
Dept: PHYSICAL THERAPY | Age: 83
Discharge: HOME | End: 2018-05-10
Payer: MEDICARE

## 2018-05-10 PROCEDURE — 97112 NEUROMUSCULAR REEDUCATION: CPT

## 2018-05-10 NOTE — PROGRESS NOTES
PT DAILY TREATMENT NOTE - [facility]     Patient Name: Alejandra Andujar. Date:5/10/2018  : 1934  [x]  Patient  Verified  Payor: Jer Art / Plan: [place] MEDICARE PART A & B / Product Type: Medicare /    In time:2:00  Out time:3:25  Total Treatment Time (min): 25  Total Timed Codes (min): 25  1:1 Treatment Time ( [address] only): 25   Visit #: 4 of 12    Treatment Area: Unsteadiness on feet [R26.81]    SUBJECTIVE  Pain Level (0-10 scale): 0/10  Any medication changes, allergies to medications, adverse drug reactions, diagnosis change, or new procedure performed?: [x] No    [] Yes (see summary sheet for update)  Subjective functional status/changes:   [x] No changes reported      OBJECTIVE    25 min Neuromuscular Re-education:  []  See flow sheet :balance ex. Rationale: increase ROM and increase strength  to improve the patients ability to perform ADls with less difficulty. With   [] TE   [] TA   [] neuro   [] other: Patient Education: [x] Review HEP    [] Progressed/Changed HEP based on:   [] positioning   [] body mechanics   [] transfers   [] heat/ice application    [] other:      Other Objective/Functional Measures:      Pain Level (0-10 scale) post treatment: 0/10    ASSESSMENT/Changes in Function: Continued with current ex. Per flow sheet. Balance ex. Continues to improve as demonstrated with ex. Patient will continue to benefit from skilled PT services to modify and progress therapeutic interventions, address functional mobility deficits, address ROM deficits, address strength deficits, analyze and address soft tissue restrictions and analyze and cue movement patterns to attain remaining goals. []  See Plan of Care  []  See progress note/recertification  []  See Discharge Summary         Progress towards goals / Updated goals:    Goal: Pt to be compliant with initial HEP to improve static and dynamic standing balance to reduce fall risk.   Status at last note/certification: Established and reviewed with Pt  Pt reports compliance 2 x daily  Long Term Goals: To be accomplished in 6 weeks:  Goal: Pt to perform Romberg EO/EC on foam x 30 sec without LOB to show improved static balance. Status at last note/certification: increased sway, minor LOB on floor x 30 sec  Not Met: initiated foam Rhomberg today. Marked sway and UE balance checks during 30 second trial.  Goal: Pt to perform Sharpened Romberg EO x 30 sec without LOB to navigate in tight spaces without falls or staggering. Status at last note/certification: unable to perform without LOB  Goal: Pt to increase DGI score to 23/24 to show improved dynamic standing balance to prevent falls with community amb. Status at last note/certification: DGI 96/18  Goal: Pt to report no falls x 4 wks to show increased safety with household and community level amb. Status at last note/certification: falls 1x/wk  Goal: Pt to report FOTO score of 69 pts to show improved function.   Status at last note/certification: FOTO 66 pts  PLAN  [x]  Upgrade activities as tolerated     [x]  Continue plan of care  []  Update interventions per flow sheet       []  Discharge due to:_  []  Other:_      Danilo Rowley, BRIAN 5/10/2018  4:39 PM    Future Appointments  Date Time Provider Joshua Carrera   5/14/2018 8:30  [place] [id number] [address]   5/17/2018 8:30  [place] [id number] [address]   5/21/2018 8:30 AM Adrián Brown, ZAC [place] [id number] [address]   5/24/2018 8:30 AM Jocelyne Ferris [place] [id number]   5/29/2018 8:30  [place] [id number]   5/31/2018 8:30  [place] [id number]

## 2018-05-14 ENCOUNTER — HOSPITAL ENCOUNTER (OUTPATIENT)
Dept: PHYSICAL THERAPY | Age: 83
Discharge: HOME | End: 2018-05-14
Payer: MEDICARE

## 2018-05-14 PROCEDURE — 97112 NEUROMUSCULAR REEDUCATION: CPT

## 2018-05-14 NOTE — PROGRESS NOTES
PT DAILY TREATMENT NOTE - [facility]     Patient Name: Pipe Chiang. Date:2018  : 1934  [x]  Patient  Verified  Payor: Laura Freed / Plan: [place] MEDICARE PART A & B / Product Type: Medicare /    In time:8:30  Out time:9;10  Total Treatment Time (min): 40  Total Timed Codes (min): 40  1:1 Treatment Time ([facility] only): 40   Visit #: 5 of 12    Treatment Area: Unsteadiness on feet [R26.81]    SUBJECTIVE  Pain Level (0-10 scale): 0  Any medication changes, allergies to medications, adverse drug reactions, diagnosis change, or new procedure performed?: [x] No    [] Yes (see summary sheet for update)  Subjective functional status/changes:   [] No changes reported  I'm feeling a little better. But I know its a slow process to get more balance    OBJECTIVE       40 min Neuromuscular Re-education:  []  See flow sheet :   Rationale: increase strength, improve coordination and improve balance  to improve the patients ability to alk safely and reduce fall risk          With   [] TE   [] TA   [] neuro   [] other: Patient Education: [x] Review HEP    [] Progressed/Changed HEP based on:   [] positioning   [] body mechanics   [] transfers   [] heat/ice application    [] other:      Other Objective/Functional Measures:      Pain Level (0-10 scale) post treatment:  0    ASSESSMENT/Changes in Function: 1 point increase in DGI and no increase in FOTO. Pt does sate the is able to do more ex;s at home. And is better at tandem gait. Patient will continue to benefit from skilled PT services to modify and progress therapeutic interventions, address functional mobility deficits, address ROM deficits, address strength deficits, analyze and address soft tissue restrictions, analyze and cue movement patterns, analyze and modify body mechanics/ergonomics, assess and modify postural abnormalities, address imbalance/dizziness and instruct in home and community integration to attain remaining goals.      []  See Plan of Care  []  See progress note/recertification  []  See Discharge Summary         Progress towards goals / Updated goals:  Goal: Pt to be compliant with initial HEP to improve static and dynamic standing balance to reduce fall risk. Status at last note/certification: Established and reviewed with Pt  Pt reports compliance 2 x daily  Long Term Goals: To be accomplished in 6 weeks:  Goal: Pt to perform Romberg EO/EC on foam x 30 sec without LOB to show improved static balance. Status at last note/certification: increased sway, minor LOB on floor x 30 sec  Not Met: initiated foam Rhomberg today. Marked sway and UE balance checks during 30 second trial.  Goal: Pt to perform Sharpened Romberg EO x 30 sec without LOB to navigate in tight spaces without falls or staggering. Status at last note/certification: unable to perform without LOB  Goal: Pt to increase DGI score to 23/24 to show improved dynamic standing balance to prevent falls with community amb. Status at last note/certification: DGI 01/06  DGI 20/24  progressing  Goal: Pt to report no falls x 4 wks to show increased safety with household and community level amb. Status at last note/certification: falls 1x/wk  1 fall, but tripped on something. Not from LOB of unknown cause  Goal: Pt to report FOTO score of 69 pts to show improved function.   Status at last note/certification: FOTO 66 pts  No change FOTO 66    PLAN  [x]  Upgrade activities as tolerated     []  Continue plan of care  []  Update interventions per flow sheet       []  Discharge due to:_  []  Other:_      Tonio Ann PTA 5/14/2018  8:42 AM    Future Appointments  Date Time Provider Joshua Carrera   5/16/2018 10:30  [address] [facility]   5/21/2018 8:30 AM Anna Brown, ZAC [facility]   5/24/2018 8:30  [address] [facility]   5/29/2018 8:30  [address] [facility]   5/31/2018 8:30  [address] [facility]

## 2018-05-16 ENCOUNTER — HOSPITAL ENCOUNTER (OUTPATIENT)
Dept: PHYSICAL THERAPY | Age: 83
Discharge: HOME | End: 2018-05-16
Payer: MEDICARE

## 2018-05-16 PROCEDURE — 97112 NEUROMUSCULAR REEDUCATION: CPT

## 2018-05-16 NOTE — PROGRESS NOTES
PT DAILY TREATMENT NOTE - [facility]     Patient Name: Erna Henriquez. Date:2018  : 1934  [x]  Patient  Verified  Payor: Aldo Font / Plan: [place] MEDICARE PART A & B / Product Type: Medicare /    In time:10:35  Out time:11;00  Total Treatment Time (min): 25  Total Timed Codes (min): 25  1:1 Treatment Time ( [address] only): 25   Visit #: 6 of 12    Treatment Area: Unsteadiness on feet [R26.81]    SUBJECTIVE  Pain Level (0-10 scale): 0  Any medication changes, allergies to medications, adverse drug reactions, diagnosis change, or new procedure performed?: [x] No    [] Yes (see summary sheet for update)  Subjective functional status/changes:   [] No changes reported  I do my ex's every day. OBJECTIVE     25 min Neuromuscular Re-education:  []  See flow sheet :   Rationale: increase strength, improve coordination, improve balance and increase proprioception  to improve the patients ability to ambulate with increased stability, reduce fall risk          With   [] TE   [] TA   [] neuro   [] other: Patient Education: [x] Review HEP    [] Progressed/Changed HEP based on:   [] positioning   [] body mechanics   [] transfers   [] heat/ice application    [] other:      Other Objective/Functional Measures:      Pain Level (0-10 scale) post treatment:  0    ASSESSMENT/Changes in Function: decreased path deviation with ambulation and head turns. Shuffles steps at times    Patient will continue to benefit from skilled PT services to modify and progress therapeutic interventions, address functional mobility deficits, address ROM deficits, address strength deficits, analyze and address soft tissue restrictions, analyze and cue movement patterns, analyze and modify body mechanics/ergonomics, assess and modify postural abnormalities, address imbalance/dizziness and instruct in home and community integration to attain remaining goals.      []  See Plan of Care  []  See progress note/recertification  []  See Discharge Summary         Progress towards goals / Updated goals:  Goal: Pt to be compliant with initial HEP to improve static and dynamic standing balance to reduce fall risk. Status at last note/certification: Established and reviewed with Pt  Pt reports compliance 2 x daily  Long Term Goals: To be accomplished in 6 weeks:  Goal: Pt to perform Romberg EO/EC on foam x 30 sec without LOB to show improved static balance. Status at last note/certification: increased sway, minor LOB on floor x 30 sec  Not Met: initiated foam Rhomberg today. Marked sway and UE balance checks during 30 second trial.  Goal: Pt to perform Sharpened Romberg EO x 30 sec without LOB to navigate in tight spaces without falls or staggering. Status at last note/certification: unable to perform without LOB  Goal: Pt to increase DGI score to 23/24 to show improved dynamic standing balance to prevent falls with community amb. Status at last note/certification: DGI 84/27  DGI 20/24  progressing  Goal: Pt to report no falls x 4 wks to show increased safety with household and community level amb. Status at last note/certification: falls 1x/wk  1 fall, but tripped on something. Not from LOB of unknown cause  Goal: Pt to report FOTO score of 69 pts to show improved function.   Status at last note/certification: FOTO 66 pts  No change FOTO 66    PLAN  [x]  Upgrade activities as tolerated     []  Continue plan of care  []  Update interventions per flow sheet       []  Discharge due to:_  []  Other:_      Ranjith Olivares, PTA 5/16/2018  11:06 AM    Future Appointments  Date Time Provider Joshua Carrera   5/21/2018 8:30 AM Glenn Brown, PT [facility]   5/24/2018 8:30  [address] [facility]   5/29/2018 8:30  [address] [facility]   5/31/2018 8:30 AM Gilmar Mohr

## 2018-05-17 ENCOUNTER — APPOINTMENT (OUTPATIENT)
Dept: PHYSICAL THERAPY | Age: 83
End: 2018-05-17
Payer: MEDICARE

## 2018-05-21 ENCOUNTER — HOSPITAL ENCOUNTER (OUTPATIENT)
Dept: PHYSICAL THERAPY | Age: 83
Discharge: HOME | End: 2018-05-21
Payer: MEDICARE

## 2018-05-21 PROCEDURE — 97112 NEUROMUSCULAR REEDUCATION: CPT

## 2018-05-21 NOTE — PROGRESS NOTES
PT DAILY TREATMENT NOTE - [facility]     Patient Name: Cheri Lagunas. Date:2018  : 1934  [x]  Patient  Verified  Payor: Lupillo Pore / Plan: [place] MEDICARE PART A & B / Product Type: Medicare /    In time:8:30  Out time:8:55  Total Treatment Time (min): 25  Total Timed Codes (min): 25  1:1 Treatment Time ( [address] only): 20  Visit #: 7 of 12    Treatment Area: Unsteadiness on feet [R26.81]    SUBJECTIVE  Pain Level (0-10 scale): 0/10  Any medication changes, allergies to medications, adverse drug reactions, diagnosis change, or new procedure performed?: [x] No    [] Yes (see summary sheet for update)  Subjective functional status/changes:   [] No changes reported  \"I am still stumbling at times but I am able to catch myself better than I used to. I am working on tandem walking at home and I almost got it. So, I feel better at times and then other times I think I haven't gotten better, but I think I am improving overall. \"     OBJECTIVE     25 min Neuromuscular Re-education:  []  See flow sheet :   Rationale: increase strength, improve coordination, improve balance and increase proprioception  to improve the patients ability to ambulate with increased stability, reduce fall risk          With   [] TE   [] TA   [] neuro   [] other: Patient Education: [x] Review HEP    [] Progressed/Changed HEP based on:   [] positioning   [] body mechanics   [] transfers   [] heat/ice application    [] other:      Other Objective/Functional Measures: Increased hold times for balance activities. Changed to eyes closed only for Romberg on foam.     Pain Level (0-10 scale) post treatment: 0/10    ASSESSMENT/Changes in Function: Improved protective strategies noted with ambulation. Pt would benefit from pertubation training to assist with further balance improvement.     Patient will continue to benefit from skilled PT services to modify and progress therapeutic interventions, address functional mobility deficits, address ROM deficits, address strength deficits, analyze and address soft tissue restrictions, analyze and cue movement patterns, analyze and modify body mechanics/ergonomics, assess and modify postural abnormalities, address imbalance/dizziness and instruct in home and community integration to attain remaining goals. []  See Plan of Care  []  See progress note/recertification  []  See Discharge Summary         Progress towards goals / Updated goals:  Short Term Goals: To be accomplished in 1 week:  Goal: Pt to be compliant with initial HEP to improve static and dynamic standing balance to reduce fall risk. Status at last note/certification: Established and reviewed with Pt  Current status: met - Pt reports compliance 2 x daily  Long Term Goals: To be accomplished in 6 weeks:  Goal: Pt to perform Romberg EO/EC on foam x 30 sec without LOB to show improved static balance. Status at last note/certification: increased sway, minor LOB on floor x 30 sec  Current status: met - able to perform eyes closed without LOB on foam  Goal: Pt to perform Sharpened Romberg EO x 30 sec without LOB to navigate in tight spaces without falls or staggering. Status at last note/certification: unable to perform without LOB  Current status: met - able to perform x 30 sec bilaterally on foam without LOB eyes open  Goal: Pt to increase DGI score to 23/24 to show improved dynamic standing balance to prevent falls with community amb. Status at last note/certification: DGI 91/99  Current status: progressing - DGI 20/24    Goal: Pt to report no falls x 4 wks to show increased safety with household and community level amb. Status at last note/certification: falls 1x/wk  Current status: progressing - 1 fall, but tripped on something, not from LOB of unknown cause  Goal: Pt to report FOTO score of 69 pts to show improved function.   Status at last note/certification: FOTO 66 pts  Current status: not met - FOTO 66 pts (no change)    PLAN  [x] Upgrade activities as tolerated     []  Continue plan of care  []  Update interventions per flow sheet       []  Discharge due to:_  []  Other:_      Libertad Brown, PT 5/21/2018  11:06 AM    Future Appointments  Date Time Provider Joshau Carrera   5/24/2018 8:30  [address] [facility]   5/29/2018 8:30  [address] [facility]   5/31/2018 8:30  [address] [facility]

## 2018-05-24 ENCOUNTER — HOSPITAL ENCOUNTER (OUTPATIENT)
Dept: PHYSICAL THERAPY | Age: 83
Discharge: HOME | End: 2018-05-24
Payer: MEDICARE

## 2018-05-24 PROCEDURE — 97112 NEUROMUSCULAR REEDUCATION: CPT

## 2018-05-24 NOTE — PROGRESS NOTES
PT DAILY TREATMENT NOTE - [facility]     Patient Name: Jose F Santamaria. Date:2018  : 1934  [x]  Patient  Verified  Payor: Mark Colten / Plan: [place] & B / Product Type: Medicare /    In time:8;30  Out time:8;50  Total Treatment Time (min): 20  Total Timed Codes (min): 20  1:1 Treatment Time ( [address] only): 8   Visit #: 8 of 12    Treatment Area: Unsteadiness on feet [R26.81]    SUBJECTIVE  Pain Level (0-10 scale): 0  Any medication changes, allergies to medications, adverse drug reactions, diagnosis change, or new procedure performed?: [x] No    [] Yes (see summary sheet for update)  Subjective functional status/changes:   [] No changes reported  So far I'm OK today. No falls or stumbles    OBJE     20 min Neuromuscular Re-education:  []  See flow sheet :   Rationale: increase strength, improve coordination, improve balance and increase proprioception  to improve the patients ability to ambulate with increased stability, reduce falls          With   [] TE   [] TA   [] neuro   [] other: Patient Education: [x] Review HEP    [] Progressed/Changed HEP based on:   [] positioning   [] body mechanics   [] transfers   [] heat/ice application    [] other:      Other Objective/Functional Measures:      Pain Level (0-10 scale) post treatment:  0    ASSESSMENT/Changes in Function: good static balance with EO, still demo staggered gait with ambulating and head turns, tandem    Patient will continue to benefit from skilled PT services to modify and progress therapeutic interventions, address functional mobility deficits, address ROM deficits, address strength deficits, analyze and address soft tissue restrictions, analyze and cue movement patterns, analyze and modify body mechanics/ergonomics, assess and modify postural abnormalities and address imbalance/dizziness to attain remaining goals.      []  See Plan of Care  []  See progress note/recertification  []  See Discharge Summary         Progress towards goals / Updated goals:  Goal: Pt to be compliant with initial HEP to improve static and dynamic standing balance to reduce fall risk. Status at last note/certification: Established and reviewed with Pt  Current status: met - Pt reports compliance 2 x daily  Long Term Goals: To be accomplished in 6 weeks:  Goal: Pt to perform Romberg EO/EC on foam x 30 sec without LOB to show improved static balance. Status at last note/certification: increased sway, minor LOB on floor x 30 sec  Current status: met - able to perform eyes closed without LOB on foam  Goal: Pt to perform Sharpened Romberg EO x 30 sec without LOB to navigate in tight spaces without falls or staggering. Status at last note/certification: unable to perform without LOB  Current status: met - able to perform x 30 sec bilaterally on foam without LOB eyes open  Goal: Pt to increase DGI score to 23/24 to show improved dynamic standing balance to prevent falls with community amb. Status at last note/certification: DGI 58/02  Current status: progressing - DGI 20/24    Goal: Pt to report no falls x 4 wks to show increased safety with household and community level amb. Status at last note/certification: falls 1x/wk  Current status: progressing - 1 fall, but tripped on something, not from LOB of unknown cause  Goal: Pt to report FOTO score of 69 pts to show improved function.   Status at last note/certification: FOTO 66 pts  Current status: not met - FOTO 66 pts (no change)    PLAN  [x]  Upgrade activities as tolerated     []  Continue plan of care  []  Update interventions per flow sheet       []  Discharge due to:_  []  Other:_      Edy Villar PTA 5/24/2018  9:07 AM    Future Appointments  Date Time Provider Joshua Carrera   5/29/2018 8:30  [address]   5/31/2018 8:30  [address]

## 2018-05-29 ENCOUNTER — HOSPITAL ENCOUNTER (OUTPATIENT)
Dept: PHYSICAL THERAPY | Age: 83
Discharge: HOME | End: 2018-05-29
Payer: MEDICARE

## 2018-05-29 PROCEDURE — G8978 MOBILITY CURRENT STATUS: HCPCS

## 2018-05-29 PROCEDURE — 97112 NEUROMUSCULAR REEDUCATION: CPT

## 2018-05-29 PROCEDURE — G8979 MOBILITY GOAL STATUS: HCPCS

## 2018-05-29 NOTE — PROGRESS NOTES
PT DAILY TREATMENT NOTE - [facility]     Patient Name: Geetha Patel. Date:2018  : 1934  [x]  Patient  Verified  Payor: Courtney Cruzdington / Plan: [place] MEDICARE PART A & B / Product Type: Medicare /    In time:8;30  Out time:8:55  Total Treatment Time (min): 25  Total Timed Codes (min): 25  1:1 Treatment Time ( only): 8   Visit #: 9 of 12    Treatment Area: Unsteadiness on feet [R26.81]    SUBJECTIVE  Pain Level (0-10 scale):  9  Any medication changes, allergies to medications, adverse drug reactions, diagnosis change, or new procedure performed?: [x] No    [] Yes (see summary sheet for update)  Subjective functional status/changes:   [] No changes reported  My wife thinks I should use  Cane for walking. Can you show me how to use it? OBJECTIVE    25 min Neuromuscular Re-education:  []  See flow sheet :   Rationale: increase strength, improve coordination and improve balance  to improve the patients ability to increase stability for safe ambulation           With   [] TE   [] TA   [] neuro   [] other: Patient Education: [x] Review HEP    [] Progressed/Changed HEP based on:   [] positioning   [] body mechanics   [] transfers   [] heat/ice application    [] other:      Other Objective/Functional Measures:   instructed pt incorrect gait with use of SPC. In right UE     Pain Level (0-10 scale) post treatment:  0    ASSESSMENT/Changes in Function: Good technique with use of SPC for ambulation and with stair negotiation. Patient will continue to benefit from skilled PT services to modify and progress therapeutic interventions, address functional mobility deficits, address ROM deficits, address strength deficits, analyze and address soft tissue restrictions, analyze and cue movement patterns, analyze and modify body mechanics/ergonomics, assess and modify postural abnormalities, address imbalance/dizziness and instruct in home and community integration to attain remaining goals.      []  See Plan of Care  []  See progress note/recertification  []  See Discharge Summary         Progress towards goals / Updated goals:  Goal: Pt to be compliant with initial HEP to improve static and dynamic standing balance to reduce fall risk. Status at last note/certification: Established and reviewed with Pt  Current status: met - Pt reports compliance 2 x daily  Long Term Goals: To be accomplished in 6 weeks:  Goal: Pt to perform Romberg EO/EC on foam x 30 sec without LOB to show improved static balance. Status at last note/certification: increased sway, minor LOB on floor x 30 sec  Current status: met - able to perform eyes closed without LOB on foam  Goal: Pt to perform Sharpened Romberg EO x 30 sec without LOB to navigate in tight spaces without falls or staggering. Status at last note/certification: unable to perform without LOB  Current status: met - able to perform x 30 sec bilaterally on foam without LOB eyes open  Goal: Pt to increase DGI score to 23/24 to show improved dynamic standing balance to prevent falls with community amb. Status at last note/certification: DGI 72/77  Current status: progressing - DGI 20/24    Goal: Pt to report no falls x 4 wks to show increased safety with household and community level amb. Status at last note/certification: falls 1x/wk  Current status: progressing - 1 fall, but tripped on something, not from LOB of unknown cause  Goal: Pt to report FOTO score of 69 pts to show improved function.   Status at last note/certification: FOTO 66 pts  Current status: not met - FOTO 66 pts (no change)    PLAN  [x]  Upgrade activities as tolerated     []  Continue plan of care  []  Update interventions per flow sheet       []  Discharge due to:_  []  Other:_      Edy Villar PTA 5/29/2018  10:10 AM    Future Appointments  Date Time Provider Joshua Carrera   5/31/2018 8:30  [address] [facility]

## 2018-05-31 ENCOUNTER — HOSPITAL ENCOUNTER (OUTPATIENT)
Dept: PHYSICAL THERAPY | Age: 83
Discharge: HOME | End: 2018-05-31
Payer: MEDICARE

## 2018-05-31 PROCEDURE — G8980 MOBILITY D/C STATUS: HCPCS

## 2018-05-31 PROCEDURE — G8979 MOBILITY GOAL STATUS: HCPCS

## 2018-05-31 PROCEDURE — 97112 NEUROMUSCULAR REEDUCATION: CPT

## 2018-05-31 NOTE — PROGRESS NOTES
PT DAILY TREATMENT NOTE - [facility]     Patient Name: Ladan Vidal. Date:2018  : 1934  [x]  Patient  Verified  Payor: Amanda López / Plan: [place] MEDICARE PART A & B / Product Type: Medicare /    In time:8:25  Out time: 9:05  Total Treatment Time (min): 40  Total Timed Codes (min): 40  1:1 Treatment Time ( only): 40   Visit #: 10 of 12    Treatment Area: Unsteadiness on feet [R26.81]    SUBJECTIVE  Pain Level (0-10 scale): 0  Any medication changes, allergies to medications, adverse drug reactions, diagnosis change, or new procedure performed?: [x] No    [] Yes (see summary sheet for update)  Subjective functional status/changes:   [] No changes reported  I don't stagger as much when I walk a straight line  And I do my ex's daily      OBJECTIVE     40 min Neuromuscular Re-education:  []  See flow sheet :   Rationale: increase strength, improve coordination, improve balance and increase proprioception  to improve the patients ability to reduce fall risk          With   [] TE   [] TA   [] neuro   [] other: Patient Education: [x] Review HEP    [] Progressed/Changed HEP based on:   [] positioning   [] body mechanics   [] transfers   [] heat/ice application    [] other:      Other Objective/Functional Measures:   Gains: less staggering, scissoring steps. Increased strength, endurance and decreased c/o hip pain with sidestepping  No gains in overall stabilty, balance. Easily \"thrown off balance\": if moving too quickly  10% improved      Pain Level (0-10 scale) post treatment:  0    ASSESSMENT/Changes in Function:  See goals    []  See Plan of Care  []  See progress note/recertification  [x]  See Discharge Summary         Progress towards goals / Updated goals:  Goal: Pt to be compliant with initial HEP to improve static and dynamic standing balance to reduce fall risk.   Status at last note/certification: Established and reviewed with Pt  Current status: met - Pt reports compliance 2 x daily  Long Term Goals: To be accomplished in 6 weeks:  Goal: Pt to perform Romberg EO/EC on foam x 30 sec without LOB to show improved static balance. Status at last note/certification: increased sway, minor LOB on floor x 30 sec  Current status: met - able to perform eyes closed without LOB on foam  Goal: Pt to perform Sharpened Romberg EO x 30 sec without LOB to navigate in tight spaces without falls or staggering. Status at last note/certification: unable to perform without LOB  Current status: met - able to perform x 30 sec bilaterally on foam without LOB eyes open  Goal: Pt to increase DGI score to 23/24 to show improved dynamic standing balance to prevent falls with community amb. Status at last note/certification: DGI 45/59  Current status: progressing - DGI 21/24    Goal: Pt to report no falls x 4 wks to show increased safety with household and community level amb. Status at last note/certification: falls 1x/wk  Current status: no falls  Met  Goal: Pt to report FOTO score of 69 pts to show improved function. Status at last note/certification: FOTO 66 pts  Current status: not met - FOTO 63 pts     PLAN  []  Upgrade activities as tolerated     []  Continue plan of care  []  Update interventions per flow sheet       [x]  Discharge due to:_  []  Other:_      Parul Walker, PTA 5/31/2018  8:33 AM    No future appointments.

## 2018-06-12 NOTE — PROGRESS NOTES
[facility], [address] [place] [address]  [phone] fax    Discharge Summary    Patient name: Seymour Baez. Start of Care: 2018   Referral source: Gayathri Rubio MD : 1934                         Medical Diagnosis: Unsteadiness on feet [R26.81] Onset Date:18                         Treatment Diagnosis: Unsteady Gait; Imbalance   Prior Hospitalization: see medical history Provider#: [id number]   Medications: Verified on Patient summary List    Comorbidities: Angina; Arthritis; Back pain; Atrial Fibrillation; Pacemaker; hx of B TKR; hx of B shoulder surgeries   Prior Level of Function: Lives in [facility] home - stays on 1st floor mainly with spouse; functionally independent; enjoyed sailing; amb with frequent falls without use of A.D. Visits from Start of Care: 10    Missed Visits: 0  Reporting Period : 18 to 18    Goal: Pt to perform Romberg EO/EC on foam x 30 sec without LOB to show improved static balance. Status at last note/certification: increased sway, minor LOB on floor x 30 sec  Current status: met - able to perform eyes closed without LOB on foam  Goal: Pt to perform Sharpened Romberg EO x 30 sec without LOB to navigate in tight spaces without falls or staggering. Status at last note/certification: unable to perform without LOB  Current status: met - able to perform x 30 sec bilaterally on foam without LOB eyes open  Goal: Pt to increase DGI score to 23/24 to show improved dynamic standing balance to prevent falls with community amb. Status at last note/certification: DGI 34/25  Current status: progressing - DGI 21/24    Goal: Pt to report no falls x 4 wks to show increased safety with household and community level amb. Status at last note/certification: falls 1x/wk  Current status: met - no falls    Goal: Pt to report FOTO score of 69 pts to show improved function.   Status at last note/certification: FOTO 66 pts  Current status: not met - FOTO 63 pts     G-Codes (GP)  Mobility   [id number] Goal  CJ= 20-39%  D/C  CJ= 20-39%    The severity rating is based on clinical judgment and the FOTO score. Assessment/ Summary of Care: Pt made progress with therapy, exhibiting increased static and dynamic standing balance and stability and reporting no falls in 5 wks when Pt was falling weekly before start of therapy. At time of last visit, Pt reported the following: Gains: less staggering, scissoring steps. Increased strength, endurance and decreased c/o hip pain with sidestepping; No gains in overall stabilty, balance. Easily \"thrown off balance\" if moving too quickly; 10% improved since start of therapy. Pt requested to be given updated HEP to continue to manage care independently. Pt is appropriate for D/C at this time.   Thank you for this referral.                      RECOMMENDATIONS:  [x]Discontinue therapy: [x]Patient has reached or is progressing toward set goals      []Patient is non-compliant or has abdicated      []Due to lack of appreciable progress towards set goals    Marybeth Brown, PT 6/12/2018 10:57 AM

## 2018-06-12 NOTE — PROGRESS NOTES
[facility], [address] [place] [address]  [phone] fax    Continued Plan of Care/ Re-certification for Physical Therapy Services      Patient name: Alejandra Martinez Start of Care: 2018   Referral source: Rajni Sprague MD : 1934                         Medical Diagnosis: Unsteadiness on feet [R26.81] Onset Date:18                         Treatment Diagnosis: Unsteady Gait; Imbalance   Prior Hospitalization: see medical history Provider#: [id number]   Medications: Verified on Patient summary List    Comorbidities: Angina; Arthritis; Back pain; Atrial Fibrillation; Pacemaker; hx of B TKR; hx of B shoulder surgeries   Prior Level of Function: Lives in [facility] home - stays on 1st floor mainly with spouse; functionally independent; enjoyed sailing; amb with frequent falls without use of A.D. Visits from Start of Care: 9    Missed Visits: 0    The Plan of Care and following information is based on the patient's current status:    Short Term Goals: To be accomplished in 1 week:  Goal: Pt to be compliant with initial HEP to improve static and dynamic standing balance to reduce fall risk. Status at last note/certification: Established and reviewed with Pt  Current status: met - Pt reports compliance 2 x daily  Long Term Goals: To be accomplished in 6 weeks:  Goal: Pt to perform Romberg EO/EC on foam x 30 sec without LOB to show improved static balance. Status at last note/certification: increased sway, minor LOB on floor x 30 sec  Current status: met - able to perform eyes closed without LOB on foam  Goal: Pt to perform Sharpened Romberg EO x 30 sec without LOB to navigate in tight spaces without falls or staggering.   Status at last note/certification: unable to perform without LOB  Current status: met - able to perform x 30 sec bilaterally on foam without LOB eyes open  Goal: Pt to increase DGI score to 23/24 to show improved dynamic standing balance to prevent falls with community amb. Status at last note/certification: DGI 73/22  Current status: progressing - DGI 21/24    Goal: Pt to report no falls x 4 wks to show increased safety with household and community level amb. Status at last note/certification: falls 1x/wk  Current status: met - no falls    Goal: Pt to report FOTO score of 69 pts to show improved function. Status at last note/certification: FOTO 66 pts  Current status: not met - FOTO 63 pts     Key functional changes:   Gains: less staggering, scissoring steps. Increased strength, endurance and decreased c/o hip pain with sidestepping  No gains in overall stabilty, balance. Easily \"thrown off balance\" if moving too quickly  10% improved since start of therapy                        Problems/ barriers to goal attainment: None     Problem List: pain affecting function, decrease strength, impaired gait/ balance, decrease ADL/ functional abilitiies and decrease activity tolerance    Treatment Plan: Therapeutic exercise, Therapeutic activities, Neuromuscular re-education, Physical agent/modality, Gait/balance training, Manual therapy and Patient education     Patient Goal (s) has been updated and includes: \"Improve my balance to not fall. \"     Goals for this certification period to be accomplished in 1 treatments:  Long term goals above remain appropriate. Frequency / Duration: Patient to be seen 2 times per week for 1 treatments:    Assessment / Recommendations: Pt would benefit from continued skilled therapy to finish last session, ensure independence with updated HEP, and readiness for D/C to continue to self-manage care independently. G-Codes (GP)  Mobility  [id number] Current  CJ= 20-39%  [id number] Goal  CJ= 20-39%    The severity rating is based on clinical judgment and the FOTO score.     Certification Period: 5/30/18 - 6/28/18    Hai Brown, PT 6/12/2018 10:51 AM    ________________________________________________________________________  I certify that the above Therapy Services are being furnished while the patient is under my care. I agree with the treatment plan and certify that this therapy is necessary. [] I have read the above and request that my patient continue as recommended.   [] I have read the above report and request that my patient continue therapy with the following changes/special instructions: ______________________________________  [] I have read the above report and request that my patient be discharged from therapy    Physician's Signature:_______________________________Date:___________Time:__________  Please sign and return to [facility] [address]  [phone] fax

## 2018-06-18 ENCOUNTER — HOSPITAL ENCOUNTER (OUTPATIENT)
Dept: CT IMAGING | Age: 83
Discharge: HOME | End: 2018-06-18
Attending: FAMILY MEDICINE
Payer: MEDICARE

## 2018-06-18 DIAGNOSIS — R51.9 CEPHALALGIA: ICD-10-CM

## 2018-06-18 DIAGNOSIS — R29.6 FALLS FREQUENTLY: ICD-10-CM

## 2018-06-18 DIAGNOSIS — S06.5XAA: ICD-10-CM

## 2018-06-18 LAB — CREAT UR-MCNC: 1.2 MG/DL (ref 0.6–1.3)

## 2018-06-18 PROCEDURE — 82565 ASSAY OF CREATININE: CPT

## 2018-06-18 PROCEDURE — 74011636320 HC RX REV CODE- 636/320: Performed by: FAMILY MEDICINE

## 2018-06-18 PROCEDURE — 70470 CT HEAD/BRAIN W/O & W/DYE: CPT

## 2018-06-18 RX ADMIN — IOPAMIDOL 70 ML: 612 INJECTION, SOLUTION INTRAVENOUS at 07:37

## 2018-11-13 ENCOUNTER — HOSPITAL ENCOUNTER (OUTPATIENT)
Dept: ULTRASOUND IMAGING | Age: 83
Discharge: HOME | End: 2018-11-13
Attending: FAMILY MEDICINE
Payer: MEDICARE

## 2018-11-13 DIAGNOSIS — R10.31 INGUINAL PAIN, RIGHT: ICD-10-CM

## 2018-11-13 DIAGNOSIS — R10.31 GROIN PAIN, RIGHT: ICD-10-CM

## 2018-11-13 PROCEDURE — 76882 US LMTD JT/FCL EVL NVASC XTR: CPT

## 2018-11-16 ENCOUNTER — OFFICE VISIT (OUTPATIENT)
Dept: SURGERY | Age: 83
End: 2018-11-16

## 2018-11-16 VITALS
HEIGHT: 75 IN | DIASTOLIC BLOOD PRESSURE: 64 MMHG | HEART RATE: 60 BPM | RESPIRATION RATE: 16 BRPM | SYSTOLIC BLOOD PRESSURE: 122 MMHG | WEIGHT: 240 LBS | BODY MASS INDEX: 29.84 KG/M2 | OXYGEN SATURATION: 97 %

## 2018-11-16 DIAGNOSIS — R10.31 GROIN PAIN, RIGHT: Primary | ICD-10-CM

## 2018-11-16 RX ORDER — ASPIRIN 81 MG/1
81 TABLET ORAL DAILY
COMMUNITY
End: 2019-07-23

## 2018-11-16 RX ORDER — CLOPIDOGREL BISULFATE 75 MG/1
TABLET ORAL
Status: ON HOLD | COMMUNITY
End: 2019-07-06

## 2018-11-16 RX ORDER — SIMVASTATIN 10 MG/1
10 TABLET, FILM COATED ORAL
COMMUNITY

## 2018-11-16 NOTE — PROGRESS NOTES
Progress Note    Patient: Keke Campos MRN: [id number]  SSN: [id number]   YOB: 1934  Age: 80 y.o. Sex: male     Chief Complaint   Patient presents with    Inguinal Hernia     right       HPI    Mr. Elton Hernández is an 80-year-old gentleman who is now about 8 months out from the placement of a watchman device in his left atrial appendage. He had this because he was in atrial fibrillation but had many falls. One required craniotomy for evacuation of clot. He describes his right groin pain starting after he had his watchman device placed in fact he is quite adamant that it started the day of the procedure after the removal of the catheter in his right groin. He has been worked up and found to have a small right inguinal hernia but this is quite small and I doubt is the cause of his pain. His leg is well-perfused his wound is invisible at this point. I suspect he is having a pain syndrome from his groin access site. Past Medical History:   Diagnosis Date    Atrial fibrillation [facility])      Past Surgical History:   Procedure Laterality Date    ANALYZ NEUROSTIM BRAIN, EACH ADD 30 MIN      brain surgery 2015    HX PACEMAKER       No Known Allergies  Current Outpatient Medications   Medication Sig Dispense Refill    simvastatin (ZOCOR) 10 mg tablet Take  by mouth nightly.  clopidogrel (PLAVIX) 75 mg tab Take  by mouth.  aspirin delayed-release 81 mg tablet Take  by mouth daily.        Social History     Socioeconomic History    Marital status:      Spouse name: Not on file    Number of children: Not on file    Years of education: Not on file    Highest education level: Not on file   Social Needs    Financial resource strain: Not on file    Food insecurity - worry: Not on file    Food insecurity - inability: Not on file    Transportation needs - medical: Not on file   [employer] needs - non-medical: Not on file   Occupational History    Not on file   Tobacco Use  Smoking status: Never Smoker    Smokeless tobacco: Never Used   Substance and Sexual Activity    Alcohol use: Not on file    Drug use: Not on file    Sexual activity: Not on file   Other Topics Concern    Not on file   Social History Narrative    Not on file     No family history on file. Review of systems:  Patient denies any reflux, emesis, abdominal pain, change in bowel habits, hematochezia, melena, fever, weight loss, fatigue chills, dermatitis, abnormal moles, change in vision, vertigo, epistaxis, dysphagia, hoarseness, chest pain, palpitations, hypertension, edema, cough, shortness of breath, wheezing, hemoptysis, snoring, hematuria, diabetes, thyroid disease, anemia, bruising, history of blood transfusion, dizziness, headache, or fainting. Physical Examination    Well developed well nourished male in no apparent distress  Visit Vitals  /64   Pulse 60   Resp 16   Ht 6' 3\" (1.905 m)   Wt 108.9 kg (240 lb)   SpO2 97%   BMI 30.00 kg/m²      Head: normocephalic, atraumatic  Mouth: Clear, no overt lesions, oral mucosa pink and moist  Neck: supple, no masses, no adenopathy or carotid bruits, trachea midline  Resp: clear to auscultation bilaterally, no wheeze, rhonchi or rales, excursions normal and symmetrical  Cardio: Regular rate and rhythm, no murmurs, clicks, gallops or rubs, no edema or varicosities  Abdomen: soft, nontender, nondistended, normoactive bowel sounds, no hernias, no hepatosplenomegaly,   Back: Deferred  Extremeties: warm, well-perfused, no tenderness or swelling, normal gait/station  Neuro: sensation and strength grossly intact and symmetrical  Psych: alert and oriented to person, place and time  Breast exam deferred    IMPRESSION  Right groin pain after large bore catheter procedure in that same groin    PLAN  Orders Placed This Encounter    simvastatin (ZOCOR) 10 mg tablet     Sig: Take  by mouth nightly.  clopidogrel (PLAVIX) 75 mg tab     Sig: Take  by mouth.     aspirin delayed-release 81 mg tablet     Sig: Take  by mouth daily.      Symptomatic relief, follow-up as needed  Adiel Smith MD

## 2019-03-26 ENCOUNTER — HOSPITAL ENCOUNTER (OUTPATIENT)
Dept: PHYSICAL THERAPY | Age: 84
Discharge: HOME | End: 2019-03-26
Payer: MEDICARE

## 2019-03-26 PROCEDURE — 97162 PT EVAL MOD COMPLEX 30 MIN: CPT

## 2019-03-26 PROCEDURE — 97110 THERAPEUTIC EXERCISES: CPT

## 2019-03-26 NOTE — PROGRESS NOTES
PT DAILY TREATMENT NOTE/HIP [id number] Patient Name: Ladan Vidal. Date:3/26/2019 : 1934 [x]  Patient  Verified Payor: [place] MEDICARE / Plan: James Dumont / Product Type: Medicare / In time: 9:50  Out time:10:30 Total Treatment Time (min): 40 Visit #: 1 of 6 Medicare/BCBS Only Total Timed Codes (min):  18 1:1 Treatment Time:  40 Treatment Area: Hip pain [M25.559] SUBJECTIVE Pain Level (0-10 scale): 2/10 []constant []intermittent []improving []worsening []no change since onset Any medication changes, allergies to medications, adverse drug reactions, diagnosis change, or new procedure performed?: [x] No    [] Yes (see summary sheet for update) Subjective functional status/changes:    
PLOF: Patient lives with wife and assists with managing household. Limitations to PLOF: Pain, limited strength and mobility Mechanism of Injury: Patient presents with left hip pain s/p Left THR on 19. Patient denies any post-op complications. According to patient he had a lateral approach done and stated he has the typical THR precautions. Patient cofirmed precautions with therapist. Patient participated in [address] following the surgery and was discharged last Tuesday. Patient stated that everything went well, just still has weakness in left hip. Patient has difficulty transferring sit to stand (utilizing increased UE support), and is challenged with navigating stairs. Patient uses a step-to and reciprocal gait occasionally when ascending stairs, and step-to only with descending. Patient was able to ambulate in a store for an hour with [facility] and reported a mild increase in pain, but had elevated fatigue. Patient currently uses a SPC more for stability then assist with bearing weight during amulation. Patient stated that he has had issues with balance since he had (B) TKR's done.  Patient denies any dizziness, no fainting, and no LOC in regards to the balance issues, just instability. Patient denies any recent falls. Patient currently denies any SOB, NO chest pain, and no calf pain. Current symptoms/Complaints: Patient describes left hip pain as a constant aching (2/10 level) located at lateral side of left hip. Previous Treatment/Compliance: PMHx/Surgical Hx: right TKR: 2000 Left TKR: 2002, in 2015 patient fell that resulted in a blood clot in the brain, which required a craniotomy, (B) shoulder arthroscopic surgery about 8-10 years ago. Pacemaker- 2006, History of A-fib, August 6, 2018- procedure at the heart to reduce risk of DVT (Patient has a WATCHMAN FLX- Left atrial Appendage Closure Device); Back surgery: 2016; No Cancer Work Hx: NA Living Situation: Stairs in home, but lives on the first floor. 7 steps into the house with rails. Uses step- to and reciprocal gait occasionally with ascending, and step-to only with descending. Pt Goals:in chart Barriers: []pain []financial []time []transportation []other Motivation:high Substance use: []Alcohol []Tobacco []other:  
FABQ Score: []low []elevate Cognition: A & O: 2    Other: OBJECTIVE/EXAMINATION Domestic Life: Lives with wife. 22 min [x]Eval                  []Re-Eval  
 
 
18 min Therapeutic Exercise:  [x] See flow sheet : HEP creation and review. Hip abd: 5 reps, Mini squats: 5 reps, HS curl: 5 reps. Rationale: increase ROM and increase strength to improve the patients ability to perform ADLs. With 
 [] TE 
 [] TA 
 [] neuro 
 [] other: Patient Education: [x] Review HEP [] Progressed/Changed HEP based on:  
[] positioning   [] body mechanics   [] transfers   [] heat/ice application   
[] other:   
 
Other Objective/Functional Measures: See below Physical Therapy Evaluation- Hip Posture: 
 
Gait:  [] Normal    [] Abnormal    [] Antalgic    [] NWB    Device: 
  Describe: mild antalgic with use of a SPC. ROM/Strength        AROM                     PROM        Strength (1-5) Hip Left Right Left Right Left Right Flexion     3- 4 Extension Abduction     3- Adduction ER        
IR        
Knee Left Right Left Right Left Right Extension     3+ 4+ Flexion     4 5 Flexibility: [] Unable to assess at this time Hamstrings: (L) Tightness= [] WNL   [x] Min   [] Mod   [] Severe (R) Tightness= [] WNL   [x] Min   [] Mod   [] Severe Quadriceps: (L) Tightness= [] WNL   [x] Min   [] Mod   [] Severe (R) Tightness= [] WNL   [x] Min   [] Mod   [] Severe Gastroc: (L) Tightness= [] WNL   [] Min   [] Mod   [] Severe (R) Tightness= [] WNL   [] Min   [] Mod   [] Severe Palpation. No tenderness/pain at (B) gastroc or ITB. [] Min  [] Mod  [] Severe    Location: 
[] Min  [] Mod  [] Severe    Location: 
[] Min  [] Mod  [] Severe    Location: 
 
Optional Tests Other tests/ comments: 
Balance: Floor: MSR: EO 30 sec no LOB 
   EC: 5 sec then required HHA Foam: Normal SPENCER: 20sec no LOB Patient required moderate use of (B) UE support with sit to stand transfers. Patient utilizes UE to transfer left LE in/off the mat table. Pain Level (0-10 scale) post treatment: 2/10 ASSESSMENT/Changes in Function: See POC. Patient reported no change in pain at end of the session. Advised patient to stop HEP if pain increases. Patient will continue to benefit from skilled PT services to modify and progress therapeutic interventions, address functional mobility deficits, address ROM deficits, address strength deficits, analyze and address soft tissue restrictions, analyze and cue movement patterns, assess and modify postural abnormalities and address imbalance/dizziness to attain remaining goals. []  See Plan of Care 
[]  See progress note/recertification 
[]  See Discharge Summary Progress towards goals / Updated goals: Short Term Goals: To be accomplished in 1weeks: 1. Patient will be ind and compliant with HEP 1-2x/day to increase ease with ADLs. Eval: HEP established 2. Patient will maintain Rhomberg stance with EO on foam surface for 30 sec without LOB to increase safety ambulating in yard. Eval: Wide Base of support on foam surface: 20 sec Long Term Goals: To be accomplished in 2 weeks: 1. Patient will improve FOTO to at least 60 to assist with return to PLOF. Eval: FOTO: 50 
            2. Patient will perform 5 sit to stand transfers with only 1 UE support to increase ease getting in/out of the car. Eval: Patient required moderate use of (B) UE support with sit to stand transfers. Frequency / Duration: Patient to PLAN 
[]  Upgrade activities as tolerated     [x]  Continue plan of care 
[]  Update interventions per flow sheet      
[]  Discharge due to:_ 
[]  Other:_   
 
Lionel Tyson, PT 3/26/2019  9:49 AM

## 2019-03-26 NOTE — PROGRESS NOTES
[facility] [address] [facility] 
[phone] fax Plan of Care/ Statement of Necessity for Physical Therapy Services Patient name: Ladan Vidal. Start of Care: 3/26/2019 Referral source: Zita Scott MD : 1934 Medical Diagnosis: Hip pain [M25.559] Payor: Amanda López / Plan: [place] MEDICARE PART A & B / Product Type: Medicare /  Onset Date:19 Treatment Diagnosis: left THR Prior Hospitalization: see medical history Provider#: [id number] Medications: Verified on Patient summary List  
 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 Pacemaker- , History of A-fib, 2018- procedure in the heart to reduce risk of DVT (Patient has a WATCHMAN FLX- Left atrial Appendage Closure Device); Back surgery:  Prior Level of Function: Patient lives with wife and assists with managing household. The Plan of Care and following information is based on the information from the initial evaluation. Assessment/ key information:  Patient presents with left hip pain s/p Left THR on 19. Patient denies any post-op complications. According to patient he had a lateral approach done and stated he has the typical THR precautions. Patient cofirmed precautions with therapist. Patient participated in [address] following the surgery and was discharged last Tuesday. Patient stated that everything went well, just still has weakness in left hip. Patient has difficulty transferring sit to stand (utilizing increased UE support), and is challenged with navigating stairs. Patient uses a step-to and reciprocal gait occasionally when ascending stairs, and step-to only with descending.  Patient utilizes a [facility] ambulation, and will sometimes ambulate without an AD in his home. Patient currently uses a SPC more for stability then assist with bearing weight during amulation. Patient stated that he has had issues with balance since he had (B) TKR's done. Patient denies any dizziness, no fainting, and no LOC in regards to the balance issues, just instability. Patient denies any recent falls. Patient describes left hip pain as a constant aching (2/10 level) located at lateral side of left hip. Patient exhibits decreased left hip/quad strength, moderate use of UE support with sit to stand transfers, and decreased stability with balance activities that involve Eyes closed and an unstable surface. Patient stated that he would like to participate in physical therapy 3x/week for 2 weeks to focus on creating a therapeutic exercise program that patient can become independent with and continue at home. Patient would benefit from skilled PT to address above deficits to assist with return to PLOF. Evaluation Complexity History MEDIUM  Complexity : 1-2 comorbidities / personal factors will impact the outcome/ POC ; Examination MEDIUM Complexity : 3 Standardized tests and measures addressing body structure, function, activity limitation and / or participation in recreation  ;Presentation MEDIUM Complexity : Evolving with changing characteristics  ; Clinical Decision Making MEDIUM Complexity : FOTO score of 26-74 Overall Complexity Rating: MEDIUM Problem List: pain affecting function, decrease ROM, decrease strength, impaired gait/ balance, decrease ADL/ functional abilitiies, decrease activity tolerance, decrease flexibility/ joint mobility and decrease transfer abilities Treatment Plan may include any combination of the following: Therapeutic exercise, Therapeutic activities, Neuromuscular re-education, Physical agent/modality, Gait/balance training, Manual therapy, Patient education, Functional mobility training and Stair training Patient / Family readiness to learn indicated by: asking questions, trying to perform skills and interest 
Persons(s) to be included in education: patient (P) Barriers to Learning/Limitations: None Patient Goal (s): improve strength Patient Self Reported Health Status: good Rehabilitation Potential: good Short Term Goals: To be accomplished in 1weeks: 1. Patient will be ind and compliant with HEP 1-2x/day to increase ease with ADLs. 2. Patient will maintain Rhomberg stance with EO on foam surface for 30 sec without LOB to increase safety ambulating in yard. Long Term Goals: To be accomplished in 2 weeks: 1. Patient will improve FOTO to at least 60 to assist with return to PLOF. 2. Patient will perform 5 sit to stand transfers with only 1 UE support to increase ease getting in/out of the car. Frequency / Duration: Patient to be seen 3 times per week for 2 weeks. Patient/ Caregiver education and instruction: Diagnosis, prognosis, exercises 
 [x]  Plan of care has been reviewed with PTA Certification Period: 03/26/19-04/25/19 Gustabo Salinas, PT 3/26/2019 9:39 AM 
 
________________________________________________________________________ I certify that the above Therapy Services are being furnished while the patient is under my care. I agree with the treatment plan and certify that this therapy is necessary. [de-identified] Signature:____________Date:_________TIME:________ 
 
[employer], Date and Time must be completed for valid certification ** Please sign and return to [place]  [address] [facility] 
[phone] fax

## 2019-04-01 ENCOUNTER — HOSPITAL ENCOUNTER (OUTPATIENT)
Dept: PHYSICAL THERAPY | Age: 84
Discharge: HOME | End: 2019-04-01
Payer: MEDICARE

## 2019-04-01 PROCEDURE — 97110 THERAPEUTIC EXERCISES: CPT

## 2019-04-01 NOTE — PROGRESS NOTES
PT DAILY TREATMENT NOTE 10-18 Patient Name: Ovidio Pozo. Date:2019 : 1934 [x]  Patient  Verified Payor: [place] MEDICARE / Plan: James Inman / Product Type: Medicare / In time: 8:00  Out time: 8:38 Total Treatment Time (min): 38 Visit #: 2 of 6 Medicare/BCBS Only Total Timed Codes (min):  38 1:1 Treatment Time:  45 Treatment Area: Left hip pain [M25.552] Presence of left artificial hip joint [Z96.642] SUBJECTIVE Pain Level (0-10 scale):  2/10 Any medication changes, allergies to medications, adverse drug reactions, diagnosis change, or new procedure performed?: [x] No    [] Yes (see summary sheet for update) Subjective functional status/changes:   [] No changes reported Pt. Reports he is doing pretty good today. He reports no difficulty with his HEP. OBJECTIVE 38 min Therapeutic Exercise:  [x] See flow sheet :  
Rationale: increase ROM and increase strength to improve the patients ability to increase ease of ambulation. With 
 [x] TE 
 [] TA 
 [] neuro 
 [] other: Patient Education: [x] Review HEP [] Progressed/Changed HEP based on:  
[] positioning   [] body mechanics   [] transfers   [] heat/ice application   
[] other:   
 
Other Objective/Functional Measures:  
Pt. Has decreased weight shift to left during ambulation. Pt. Had no difficulty with 2# weight during therex He was challenged with right foot in front during MSR with eyes closed Pain Level (0-10 scale) post treatment: 3-4/10 ASSESSMENT/Changes in Function:  Pt. Initiated PT and is compliant with his HEP.   
 
Patient will continue to benefit from skilled PT services to modify and progress therapeutic interventions, address functional mobility deficits, address ROM deficits, address strength deficits, analyze and address soft tissue restrictions, analyze and cue movement patterns, analyze and modify body mechanics/ergonomics and assess and modify postural abnormalities to attain remaining goals. Progress towards goals / Updated goals: 
Short Term Goals: To be accomplished in 1weeks: 1. Patient will be ind and compliant with HEP 1-2x/day to increase ease with ADLs. Met (4/1/19) 2. Patient will maintain Rhomberg stance with EO on foam surface for 30 sec without LOB to increase safety ambulating in yard. Long Term Goals: To be accomplished in 2 weeks: 1. Patient will improve FOTO to at least 60 to assist with return to PLOF. 2. Patient will perform 5 sit to stand transfers with only 1 UE support to increase ease getting in/out of the car. PLAN 
[]  Upgrade activities as tolerated     [x]  Continue plan of care 
[]  Update interventions per flow sheet      
[]  Discharge due to:_ 
[]  Other:_ Adalid García, ZAC 4/1/2019  7:43 AM 
 
Future Appointments Date Time Provider Joshua Carrera 4/1/2019  8:00 AM Domo Freeman, PT [facility]  
4/3/2019  9:00 AM Kajal Linares PTA [facility]  
4/5/2019  8:00 AM Kajal Linares PTA [facility]  
4/9/2019  8:00 AM Kajal Linares PTA [facility]  
4/10/2019  9:00 AM Domo Freeman, PT [facility]  
4/12/2019  8:00 AM Kajal Linares PTA [facility]

## 2019-04-03 ENCOUNTER — HOSPITAL ENCOUNTER (OUTPATIENT)
Dept: PHYSICAL THERAPY | Age: 84
Discharge: HOME | End: 2019-04-03
Payer: MEDICARE

## 2019-04-03 PROCEDURE — 97110 THERAPEUTIC EXERCISES: CPT

## 2019-04-03 NOTE — PROGRESS NOTES
PT DAILY TREATMENT NOTE 10-18 Patient Name: Vijaya Montoya. Date:4/3/2019 : 1934 [x]  Patient  Verified Payor: [place] MEDICARE / Plan: James Dumont / Product Type: Medicare / In time:855  Out time:930 Total Treatment Time (min): 35 Visit #: 3 of 6 Medicare/BCBS Only Total Timed Codes (min):  35 1:1 Treatment Time:  35 Treatment Area: Pain in left hip [M25.552] Presence of left artificial hip joint [Z96.642] SUBJECTIVE Pain Level (0-10 scale): 2/10 Any medication changes, allergies to medications, adverse drug reactions, diagnosis change, or new procedure performed?: [x] No    [] Yes (see summary sheet for update) Subjective functional status/changes:   [] No changes reported Pt stated that if he was any better he would owe me change OBJECTIVE 35 min Therapeutic Exercise:  [x] See flow sheet :  
Rationale: increase ROM and increase strength to improve the patients ability to increase ease with walking With 
 [x] TE 
 [] TA 
 [] neuro 
 [] other: Patient Education: [x] Review HEP [] Progressed/Changed HEP based on:  
[] positioning   [] body mechanics   [] transfers   [] heat/ice application   
[] other:   
 
Other Objective/Functional Measures:  
Had no complaint of increased pain during session Tolerated increased made today without difficulty SL abd was challenging Pain Level (0-10 scale) post treatment: 3/10 ASSESSMENT/Changes in Function:  
Pt is progressing well toward goals. Pt cont with decreased strength in left hip, but is improving. Pt is ambulating with SPC, but reported that he really does not need or use it most of the time. Sit to stand transfers are improving. Patient will continue to benefit from skilled PT services to modify and progress therapeutic interventions, address functional mobility deficits, address ROM deficits, address strength deficits and instruct in home and community integration to attain remaining goals. [x]  See Plan of Care 
[]  See progress note/recertification 
[]  See Discharge Summary Progress towards goals / Updated goals: 
Short Term Goals: To be accomplished in 1weeks: 1. Patient will be ind and compliant with HEP 1-2x/day to increase ease with ADLs. 2. Patient will maintain Rhomberg stance with EO on foam surface for 30 sec without LOB to increase safety ambulating in yard. Long Term Goals: To be accomplished in 2 weeks: 1. Patient will improve FOTO to at least 60 to assist with return to PLOF. 2. Patient will perform 5 sit to stand transfers with only 1 UE support to increase ease getting in/out of the car. PLAN 
[]  Upgrade activities as tolerated     [x]  Continue plan of care 
[]  Update interventions per flow sheet      
[]  Discharge due to:_ 
[]  Other:_ Jo Ann Whiteside PTA 4/3/2019  8:51 AM 
 
Future Appointments Date Time Provider Joshua Carrera 4/3/2019  9:00 AM Aj Carrasquillo PTA [facility]  
4/5/2019  8:00 AM Aj Carrasquillo PTA [facility]  
4/9/2019  2:00 PM Aj Carrasquillo PTA [facility]  
4/10/2019  9:00 AM Carlos Riley, PT [facility]  
4/12/2019  8:00 AM Aj Carrasquillo PTA [facility]

## 2019-04-05 ENCOUNTER — HOSPITAL ENCOUNTER (OUTPATIENT)
Dept: PHYSICAL THERAPY | Age: 84
Discharge: HOME | End: 2019-04-05
Payer: MEDICARE

## 2019-04-05 PROCEDURE — 97110 THERAPEUTIC EXERCISES: CPT

## 2019-04-05 NOTE — PROGRESS NOTES
PT DAILY TREATMENT NOTE 10-18 Patient Name: Rafael Sultana. Date:2019 : 1934 [x]  Patient  Verified Payor: [place] MEDICARE / Plan: James Inmanmine / Product Type: Medicare / In time:756  Out time:828 Total Treatment Time (min): 32 Visit #: 4 of 6 Medicare/BCBS Only Total Timed Codes (min):  32 1:1 Treatment Time:  32 Treatment Area: Pain in left hip [M25.552] Presence of left artificial hip joint [Z96.642] SUBJECTIVE Pain Level (0-10 scale): 2/10 Any medication changes, allergies to medications, adverse drug reactions, diagnosis change, or new procedure performed?: [x] No    [] Yes (see summary sheet for update) Subjective functional status/changes:   [] No changes reported Pt stated that he is about the same. Pt reported that his legs felt like rubber after last visit OBJECTIVE 32 min Therapeutic Exercise:  [x] See flow sheet :  
Rationale: increase ROM and increase strength to improve the patients ability to increase ease with ADLs With 
 [x] TE 
 [] TA 
 [] neuro 
 [] other: Patient Education: [x] Review HEP [] Progressed/Changed HEP based on:  
[] positioning   [] body mechanics   [] transfers   [] heat/ice application   
[] other:   
 
Other Objective/Functional Measures:  
Had no difficulty with exercises No complaint of increased pain during session Pain Level (0-10 scale) post treatment: 3/10 ASSESSMENT/Changes in Function:  
Pt is progressing well toward goals and will be discharged in 2 visits. Patient will continue to benefit from skilled PT services to modify and progress therapeutic interventions, address functional mobility deficits, address ROM deficits and address strength deficits to attain remaining goals. [x]  See Plan of Care 
[]  See progress note/recertification 
[]  See Discharge Summary Progress towards goals / Updated goals: 
Short Term Goals: To be accomplished in 1weeks:             1. Patient will be ind and compliant with HEP 1-2x/day to increase ease with ADLs. Goal met. 4/5/19 
            2. Patient will maintain Rhomberg stance with EO on foam surface for 30 sec without LOB to increase safety ambulating in yard. Goal met. 4/5/19 Long Term Goals: To be accomplished in 2 weeks: 
            1. Patient will improve FOTO to at least 60 to assist with return to PLOF.             2. Patient will perform 5 sit to stand transfers with only 1 UE support to increase ease getting in/out of the car. Goal met. Pt is able to perform 5 sit to stands without UE support. 4/5/19 PLAN 
[]  Upgrade activities as tolerated     [x]  Continue plan of care 
[]  Update interventions per flow sheet      
[]  Discharge due to:_ 
[]  Other:_ Dayami Bob PTA 4/5/2019  7:56 AM 
 
Future Appointments Date Time Provider Joshua Carrera 4/5/2019  8:00 AM Feli Lui PTA [facility]  
4/9/2019  2:00 PM Feli Lui PTA [facility]  
4/10/2019  9:00 AM Derral Guardian [facility]  
4/12/2019  8:00 AM Feli Lui PTA [facility]

## 2019-04-09 ENCOUNTER — HOSPITAL ENCOUNTER (OUTPATIENT)
Dept: PHYSICAL THERAPY | Age: 84
Discharge: HOME | End: 2019-04-09
Payer: MEDICARE

## 2019-04-09 PROCEDURE — 97110 THERAPEUTIC EXERCISES: CPT

## 2019-04-09 NOTE — PROGRESS NOTES
PT DAILY TREATMENT NOTE 10-18 Patient Name: Reese Angulo. Date:2019 : 1934 [x]  Patient  Verified Payor: [place] MED[place] / Plan: James Jimenez y / Product Type: Medicare / In time:200  Out time:230 Total Treatment Time (min): 30 Visit #: 5 of 6 Medicare/BCBS Only Total Timed Codes (min):  30 1:1 Treatment Time:  30 Treatment Area: Pain in left hip [M25.552] Presence of left artificial hip joint [Z96.642] SUBJECTIVE Pain Level (0-10 scale): 2/10 Any medication changes, allergies to medications, adverse drug reactions, diagnosis change, or new procedure performed?: [x] No    [] Yes (see summary sheet for update) Subjective functional status/changes:   [] No changes reported Pt stated that he knows he will have slight pain for about 6 months and is expecting it OBJECTIVE 30 min Therapeutic Exercise:  [x] See flow sheet :  
Rationale: increase ROM and increase strength to improve the patients ability to increase ease with ADLs With 
 [x] TE 
 [] TA 
 [] neuro 
 [] other: Patient Education: [x] Review HEP [x] Progressed/Changed HEP based on:  
[] positioning   [] body mechanics   [] transfers   [] heat/ice application   
[] other:   
 
Other Objective/Functional Measures: Pt was given final HEP Showed understanding of all exercises Pain Level (0-10 scale) post treatment: 2/10 ASSESSMENT/Changes in Function:  
Pt is making good progress toward goals. Plan is to D/C next visit if pt is comfortable with performing the exercises at home Patient will continue to benefit from skilled PT services to modify and progress therapeutic interventions, address functional mobility deficits, address ROM deficits and address strength deficits to attain remaining goals. [x]  See Plan of Care 
[]  See progress note/recertification 
[]  See Discharge Summary Progress towards goals / Updated goals: 
Short Term Goals: To be accomplished in 1weeks:             1. Patient will be ind and compliant with HEP 1-2x/day to increase ease with ADLs. Goal met. 4/5/19 
            2. Patient will maintain Rhomberg stance with EO on foam surface for 30 sec without LOB to increase safety ambulating in yard. Goal met. 4/5/19 
  
Long Term Goals: To be accomplished in 2 weeks: 
            1. Patient will improve FOTO to at least 60 to assist with return to PLOF.             2. Patient will perform 5 sit to stand transfers with only 1 UE support to increase ease getting in/out of the car.  
Goal met. Pt is able to perform 5 sit to stands without UE support. 4/5/19 PLAN 
[]  Upgrade activities as tolerated     [x]  Continue plan of care 
[]  Update interventions per flow sheet      
[]  Discharge due to:_ 
[]  Other:_ Chey Olivarez PTA 4/9/2019  1:57 PM 
 
Future Appointments Date Time Provider Joshua Carrera 4/9/2019  2:00 PM Elvia Dai PTA [facility]  
4/10/2019  9:00 AM Isacc Smith [facility]  
4/12/2019  8:00 AM Elvia Dai PTA [facility]  
4/19/2019  3:00 PM Leticia Vang MD [address] [place] [address]

## 2019-04-10 ENCOUNTER — APPOINTMENT (OUTPATIENT)
Dept: PHYSICAL THERAPY | Age: 84
End: 2019-04-10
Payer: MEDICARE

## 2019-04-12 ENCOUNTER — HOSPITAL ENCOUNTER (OUTPATIENT)
Dept: PHYSICAL THERAPY | Age: 84
Discharge: HOME | End: 2019-04-12
Payer: MEDICARE

## 2019-04-12 PROCEDURE — 97110 THERAPEUTIC EXERCISES: CPT

## 2019-04-12 NOTE — PROGRESS NOTES
PT DAILY TREATMENT NOTE 10-18 Patient Name: Buffy Machado. Date:2019 : 1934 [x]  Patient  Verified Payor: [place] MEDICARE / Plan: James Jimenez mine / Product Type: Medicare / In time: 8:00  Out time: 8:34 Total Treatment Time (min): 34 Visit #: 6 of 6 Medicare/BCBS Only Total Timed Codes (min):  23 1:1 Treatment Time:  21 Treatment Area: Pain in left hip [M25.552] Presence of left artificial hip joint [Z96.642] SUBJECTIVE Pain Level (0-10 scale):  2/10 Any medication changes, allergies to medications, adverse drug reactions, diagnosis change, or new procedure performed?: [x] No    [] Yes (see summary sheet for update) Subjective functional status/changes:   [] No changes reported Pt. Reports he only has mild pain in his hip. He reports no problem with his HEP. OBJECTIVE 34 min Therapeutic Exercise:  [x] See flow sheet :  
Rationale: increase ROM and increase strength to improve the patients ability to increase ease of ADLs With 
 [x] TE 
 [] TA 
 [] neuro 
 [] other: Patient Education: [x] Review HEP [] Progressed/Changed HEP based on:  
[] positioning   [] body mechanics   [] transfers   [] heat/ice application   
[] other:   
 
Other Objective/Functional Measures: FOTO:  62 points Pt. Continues to have decreased left hip abduction strength He has decreased step length during ambulation and decreased weight shift to left side Pain Level (0-10 scale) post treatment: 210 ASSESSMENT/Changes in Function:  
  
[x]  See Plan of Care 
[]  See progress note/recertification 
[x]  See Discharge Summary Progress towards goals / Updated goals: 
See D/C note PLAN 
[]  Upgrade activities as tolerated     []  Continue plan of care 
[]  Update interventions per flow sheet [x]  Discharge due to:_ progress towards goals 
[]  Other:_ Joe Khanna, PT 2019  7:40 AM 
 
Future Appointments Date Time Provider Joshua Ramírezi 4/12/2019  8:00 AM Brent Pappas, PT [id number]  
4/19/2019  3:00 PM Chanel Paz MD [address] [place] [address]

## 2019-04-12 NOTE — PROGRESS NOTES
[facility] [address] [facility] 
[phone] fax Physical Therapy Discharge Summary Patient name: Marc Moffett. Start of Care:  3/26/19 Referral source: Vinnie Pacheco MD : 1934 Medical/Treatment Diagnosis: Pain in left hip [M25.552] Presence of left artificial hip joint [Z96.642] Payor: Zarina Doherty / Plan: [place] MEDICARE PART A & B / Product Type: Medicare /  Onset Date: 19 Prior Hospitalization: see medical history Provider#: [id number] Medications: Verified on Patient Summary List   
Comorbidities:  Heart disease, arthritis, right TKR:  Left TKR: 2935, RS 4952 RDMRSFD fell that resulted in a blood clot in the brain which required a craniotomy, (B) shoulder arthroscopic surgery about 8-10 years ago. Pacemaker- , History of A-fib, 2018- procedure in the heart to reduce risk of DVT (Patient has a WATCHMAN FLX- Left atrial Appendage Closure Device); Back surgery:  Prior Level of Function: Patient lives with wife and assists with managing household.  
 
Visits from Start of Care: 6    Missed Visits: 0 Reporting Period : 3/26/19 to 19 Summary of Care: 
Goal: Patient will improve FOTO to at least 60 to assist with return to PLOF. Status at last note/certification: 50 Status at discharge: met Goal: Patient will perform 5 sit to stand transfers with only 1 UE support to increase ease getting in/out of the car. Status at last note/certification: unable Status at discharge: met Pt. Progressed well with physical therapy. He demonstrates good understanding of his HEP and was educated on continuing this following D/C in order to continue to improve strength. He continues to have decreased left hip abduction strength at 4-/5 contributing to gait deviations. He ambulates with decreased step length and decreased weight shift to left. He demonstrates increased ease of sit to stand transfers. FOTO score improved to 62 points indicating a significant improvement in function. ASSESSMENT/RECOMMENDATIONS: 
[x]Discontinue therapy: [x]Patient has reached or is progressing toward set goals []Patient is non-compliant or has abdicated 
    []Due to lack of appreciable progress towards set goals Adalid García, PT 4/12/2019 8:13 AM

## 2019-04-25 ENCOUNTER — OFFICE VISIT (OUTPATIENT)
Dept: SURGERY | Age: 84
End: 2019-04-25

## 2019-04-25 VITALS
WEIGHT: 240 LBS | SYSTOLIC BLOOD PRESSURE: 106 MMHG | BODY MASS INDEX: 29.84 KG/M2 | DIASTOLIC BLOOD PRESSURE: 40 MMHG | HEIGHT: 75 IN | RESPIRATION RATE: 16 BRPM

## 2019-04-25 DIAGNOSIS — K40.90 NON-RECURRENT UNILATERAL INGUINAL HERNIA WITHOUT OBSTRUCTION OR GANGRENE: Primary | ICD-10-CM

## 2019-04-25 NOTE — PROGRESS NOTES
Progress Note    Patient: Jelena Broussard. MRN: [id number]  SSN: [id number]   YOB: 1934  Age: 80 y.o. Sex: male     Chief Complaint   Patient presents with    Inguinal Hernia     right inguinal hernia       HPI    Mr. Ramon Quiñones is a patient of seen in the past for small right inguinal hernia. In February he underwent a hip replacement and did well and has been in his occult therapy since then. He tells me sometimes this hernia stainings but otherwise it is not too symptomatic. We discussed at length the pathophysiology and the natural history of hernias and he understands them completely. He is not in favor of an operation right now which I cannot blame him for. He is telling me he is probably going to be moving to [place] soon and so he will let us know what he wants to do. Past Medical History:   Diagnosis Date    Atrial fibrillation [facility])      Past Surgical History:   Procedure Laterality Date    HX ORTHOPAEDIC      left hip replacement    HX PACEMAKER      [place] ANALYZ NEUROSTIM BRAIN, EACH ADD 30 MIN      brain surgery 2015     No Known Allergies  Current Outpatient Medications   Medication Sig Dispense Refill    simvastatin (ZOCOR) 10 mg tablet Take  by mouth nightly.  aspirin delayed-release 81 mg tablet Take  by mouth daily.  clopidogrel (PLAVIX) 75 mg tab Take  by mouth.        Social History     Socioeconomic History    Marital status:      Spouse name: Not on file    Number of children: Not on file    Years of education: Not on file    Highest education level: Not on file   Occupational History    Not on file   Social Needs    Financial resource strain: Not on file    Food insecurity:     Worry: Not on file     Inability: Not on file    Transportation needs:     Medical: Not on file     Non-medical: Not on file   Tobacco Use    Smoking status: Never Smoker    Smokeless tobacco: Never Used   Substance and Sexual Activity    Alcohol use: Not on file  Drug use: Not on file    Sexual activity: Not on file   Lifestyle    Physical activity:     Days per week: Not on file     Minutes per session: Not on file    Stress: Not on file   Relationships    Social connections:     Talks on phone: Not on file     Gets together: Not on file     Attends [employer] service: Not on file     Active member of club or organization: Not on file     Attends meetings of clubs or organizations: Not on file     Relationship status: Not on file    Intimate partner violence:     Fear of current or ex partner: Not on file     Emotionally abused: Not on file     Physically abused: Not on file     Forced sexual activity: Not on file   Other Topics Concern    Not on file   Social History Narrative    Not on file     No family history on file. Review of systems:  Patient denies any reflux, emesis, abdominal pain, change in bowel habits, hematochezia, melena, fever, weight loss, fatigue chills, dermatitis, abnormal moles, change in vision, vertigo, epistaxis, dysphagia, hoarseness, chest pain, palpitations, hypertension, edema, cough, shortness of breath, wheezing, hemoptysis, snoring, hematuria, diabetes, thyroid disease, anemia, bruising, history of blood transfusion, dizziness, headache, or fainting.     Physical Examination    Well developed well nourished male in no apparent distress  Visit Vitals  /40   Resp 16   Ht 6' 3\" (1.905 m)   Wt 108.9 kg (240 lb)   BMI 30.00 kg/m²      Head: normocephalic, atraumatic  Mouth: Clear, no overt lesions, oral mucosa pink and moist  Neck: supple, no masses, no adenopathy or carotid bruits, trachea midline  Resp: clear to auscultation bilaterally, no wheeze, rhonchi or rales, excursions normal and symmetrical  Cardio: Regular rate and rhythm, no murmurs, clicks, gallops or rubs, no edema or varicosities  Abdomen: soft, nontender, nondistended, normoactive bowel sounds, small right inguinal hernia easily reducible, no hepatosplenomegaly,   Back: Deferred  Extremeties: warm, well-perfused, no tenderness or swelling, normal gait/station  Neuro: sensation and strength grossly intact and symmetrical  Psych: alert and oriented to person, place and time  Breast exam deferred    IMPRESSION  Small right inguinal hernia    PLAN  No orders of the defined types were placed in this encounter.     Follow-up JESSICA Scott MD

## 2019-06-24 ENCOUNTER — APPOINTMENT (OUTPATIENT)
Dept: GENERAL RADIOLOGY | Age: 84
End: 2019-06-24
Attending: EMERGENCY MEDICINE
Payer: MEDICARE

## 2019-06-24 ENCOUNTER — HOSPITAL ENCOUNTER (EMERGENCY)
Age: 84
Discharge: ANOTHER HOSPITAL | End: 2019-06-24
Attending: EMERGENCY MEDICINE
Payer: MEDICARE

## 2019-06-24 VITALS
DIASTOLIC BLOOD PRESSURE: 67 MMHG | SYSTOLIC BLOOD PRESSURE: 128 MMHG | HEIGHT: 74 IN | WEIGHT: 230 LBS | RESPIRATION RATE: 14 BRPM | OXYGEN SATURATION: 96 % | TEMPERATURE: 98.2 F | HEART RATE: 70 BPM | BODY MASS INDEX: 29.52 KG/M2

## 2019-06-24 DIAGNOSIS — S72.22XA CLOSED DISPLACED SUBTROCHANTERIC FRACTURE OF LEFT FEMUR, INITIAL ENCOUNTER (HCC): Primary | ICD-10-CM
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
## 2019-06-24 LAB
ANION GAP SERPL CALC-SCNC: 4 MMOL/L (ref 3–18)
BASOPHILS # BLD: 0 K/UL (ref 0–0.1)
BASOPHILS NFR BLD: 0 % (ref 0–2)
BUN SERPL-MCNC: 14 MG/DL (ref 7–18)
BUN/CREAT SERPL: 11 (ref 12–20)
CALCIUM SERPL-MCNC: 8.3 MG/DL (ref 8.5–10.1)
CHLORIDE SERPL-SCNC: 107 MMOL/L (ref 100–108)
CO2 SERPL-SCNC: 28 MMOL/L (ref 21–32)
CREAT SERPL-MCNC: 1.32 MG/DL (ref 0.6–1.3)
DIFFERENTIAL METHOD BLD: ABNORMAL
EOSINOPHIL # BLD: 0.1 K/UL (ref 0–0.4)
EOSINOPHIL NFR BLD: 1 % (ref 0–5)
ERYTHROCYTE [DISTWIDTH] IN BLOOD BY AUTOMATED COUNT: 12.8 % (ref 11.6–14.5)
GLUCOSE SERPL-MCNC: 121 MG/DL (ref 74–99)
HCT VFR BLD AUTO: 35.3 % (ref 36–48)
HGB BLD-MCNC: 12.2 G/DL (ref 13–16)
LYMPHOCYTES # BLD: 2.4 K/UL (ref 0.9–3.6)
LYMPHOCYTES NFR BLD: 49 % (ref 21–52)
MCH RBC QN AUTO: 31.4 PG (ref 24–34)
MCHC RBC AUTO-ENTMCNC: 34.6 G/DL (ref 31–37)
MCV RBC AUTO: 91 FL (ref 74–97)
MONOCYTES # BLD: 0.5 K/UL (ref 0.05–1.2)
MONOCYTES NFR BLD: 11 % (ref 3–10)
NEUTS SEG # BLD: 2 K/UL (ref 1.8–8)
NEUTS SEG NFR BLD: 39 % (ref 40–73)
PLATELET # BLD AUTO: 168 K/UL (ref 135–420)
PMV BLD AUTO: 9.3 FL (ref 9.2–11.8)
POTASSIUM SERPL-SCNC: 4.2 MMOL/L (ref 3.5–5.5)
RBC # BLD AUTO: 3.88 M/UL (ref 4.7–5.5)
SODIUM SERPL-SCNC: 139 MMOL/L (ref 136–145)
WBC # BLD AUTO: 5 K/UL (ref 4.6–13.2)

## 2019-06-24 PROCEDURE — 85025 COMPLETE CBC W/AUTO DIFF WBC: CPT

## 2019-06-24 PROCEDURE — 99285 EMERGENCY DEPT VISIT HI MDM: CPT

## 2019-06-24 PROCEDURE — 96374 THER/PROPH/DIAG INJ IV PUSH: CPT

## 2019-06-24 PROCEDURE — 73521 X-RAY EXAM HIPS BI 2 VIEWS: CPT

## 2019-06-24 PROCEDURE — 96375 TX/PRO/DX INJ NEW DRUG ADDON: CPT

## 2019-06-24 PROCEDURE — 96376 TX/PRO/DX INJ SAME DRUG ADON: CPT

## 2019-06-24 PROCEDURE — 73552 X-RAY EXAM OF FEMUR 2/>: CPT

## 2019-06-24 PROCEDURE — 74011250636 HC RX REV CODE- 250/636: Performed by: EMERGENCY MEDICINE

## 2019-06-24 PROCEDURE — 93005 ELECTROCARDIOGRAM TRACING: CPT

## 2019-06-24 PROCEDURE — 73560 X-RAY EXAM OF KNEE 1 OR 2: CPT

## 2019-06-24 PROCEDURE — 80048 BASIC METABOLIC PNL TOTAL CA: CPT

## 2019-06-24 RX ORDER — ONDANSETRON 2 MG/ML
4 INJECTION INTRAMUSCULAR; INTRAVENOUS
Status: COMPLETED | OUTPATIENT
Start: 2019-06-24 | End: 2019-06-24

## 2019-06-24 RX ORDER — HYDROMORPHONE HYDROCHLORIDE 1 MG/ML
1 INJECTION, SOLUTION INTRAMUSCULAR; INTRAVENOUS; SUBCUTANEOUS ONCE
Status: DISCONTINUED | OUTPATIENT
Start: 2019-06-24 | End: 2019-06-25 | Stop reason: HOSPADM

## 2019-06-24 RX ORDER — HYDROMORPHONE HYDROCHLORIDE 1 MG/ML
1 INJECTION, SOLUTION INTRAMUSCULAR; INTRAVENOUS; SUBCUTANEOUS ONCE
Status: COMPLETED | OUTPATIENT
Start: 2019-06-24 | End: 2019-06-24

## 2019-06-24 RX ORDER — MORPHINE SULFATE 4 MG/ML
4 INJECTION INTRAVENOUS
Status: COMPLETED | OUTPATIENT
Start: 2019-06-24 | End: 2019-06-24

## 2019-06-24 RX ADMIN — MORPHINE SULFATE 4 MG: 4 INJECTION INTRAVENOUS at 18:48

## 2019-06-24 RX ADMIN — ONDANSETRON 4 MG: 2 INJECTION INTRAMUSCULAR; INTRAVENOUS at 18:46

## 2019-06-24 RX ADMIN — MORPHINE SULFATE 4 MG: 4 INJECTION INTRAVENOUS at 20:02

## 2019-06-24 RX ADMIN — HYDROMORPHONE HYDROCHLORIDE 1 MG: 1 INJECTION, SOLUTION INTRAMUSCULAR; INTRAVENOUS; SUBCUTANEOUS at 20:47

## 2019-06-24 NOTE — ED PROVIDER NOTES
EMERGENCY DEPARTMENT HISTORY AND PHYSICAL EXAM      Date: 6/24/2019  Patient Name: La Hughes. History of Presenting Illness     Chief Complaint   Patient presents with    Hip Pain       History Provided By: Patient    Chief Complaint: Fall and left hip pain    Additional History (Context): La Lea is a 80 y.o. male [facility] who presents with a mechanical fall or working in his yard, turned and twisted and lost his footing and fell onto his left side. Had immediate left hip pain and inability to bear weight. Also has some mild pain in his left knee. No other injuries or complaints. No loss of consciousness, head trauma, neck pain, nausea vomiting diarrhea, abdominal pain. PCP: Abigail Duarte MD    Current Outpatient Medications   Medication Sig Dispense Refill    simvastatin (ZOCOR) 10 mg tablet Take  by mouth nightly.  clopidogrel (PLAVIX) 75 mg tab Take  by mouth.  aspirin delayed-release 81 mg tablet Take  by mouth daily. Past History     Past Medical History:  Past Medical History:   Diagnosis Date    Atrial fibrillation [facility])        Past Surgical History:  Past Surgical History:   Procedure Laterality Date    HX ORTHOPAEDIC      left hip replacement    HX PACEMAKER      [place] ANALYZ NEUROSTIM BRAIN, EACH ADD 27 MIN      brain surgery 2015       Family History:  No family history on file. Social History:  Social History     Tobacco Use    Smoking status: Never Smoker    Smokeless tobacco: Never Used   Substance Use Topics    Alcohol use: Not on file    Drug use: Not on file   Denies drug or alcohol abuse    Allergies:  No Known Allergies      Review of Systems   Review of Systems   Constitutional: Negative for chills, fatigue and fever. HENT: Negative for congestion, rhinorrhea, sore throat and trouble swallowing. Eyes: Negative for discharge, redness and itching. Respiratory: Negative for cough, shortness of breath, wheezing and stridor. Cardiovascular: Negative for chest pain, palpitations and leg swelling. Gastrointestinal: Negative for abdominal pain, blood in stool, diarrhea, nausea and vomiting. Genitourinary: Negative for difficulty urinating and dysuria. Musculoskeletal: Positive for arthralgias, gait problem, joint swelling and myalgias. Negative for back pain, neck pain and neck stiffness. Skin: Negative for rash. Neurological: Negative for syncope and light-headedness. Psychiatric/Behavioral: Negative for behavioral problems and confusion. All other systems reviewed and are negative. Physical Exam     Vitals:    06/24/19 2046   BP: 138/75   Pulse: (!) 59   Resp: 16   Temp: 96.8 °F (36 °C)   SpO2: 99%   Weight: 104.3 kg (230 lb)   Height: 6' 2\" (1.88 m)     Physical Exam   Constitutional: He is oriented to person, place, and time. He appears well-developed and well-nourished. No distress. HENT:   Head: Normocephalic and atraumatic. Mouth/Throat: Oropharynx is clear and moist.   Eyes: Pupils are equal, round, and reactive to light. Conjunctivae and EOM are normal.   Neck: Normal range of motion. Neck supple. No tenderness to palpation   Cardiovascular: Normal rate, regular rhythm and normal heart sounds. No murmur heard. Pulmonary/Chest: Effort normal and breath sounds normal. He has no wheezes. Abdominal: Soft. Bowel sounds are normal. There is no tenderness. Musculoskeletal: Normal range of motion. He exhibits no tenderness. Left ankle full range of motion no tenderness palpation neurovascular intact distally. Left knee with some mild anterior discomfort to deep palpation, no effusion, no joint line tenderness, stable MCL LCL anterior and posterior drawer. Left proximal femur and left hip with mild diffuse tenderness to palpation, decreased range of motion secondary to pain.    Neurological: He is alert and oriented to person, place, and time. Skin: Skin is warm and dry. No rash noted. No erythema. Psychiatric: He has a normal mood and affect. His behavior is normal. Judgment and thought content normal.   Nursing note and vitals reviewed. Diagnostic Study Results     Labs -     Recent Results (from the past 12 hour(s))   METABOLIC PANEL, BASIC    Collection Time: 06/24/19  7:13 PM   Result Value Ref Range    Sodium 139 136 - 145 mmol/L    Potassium 4.2 3.5 - 5.5 mmol/L    Chloride 107 100 - 108 mmol/L    CO2 28 21 - 32 mmol/L    Anion gap 4 3.0 - 18 mmol/L    Glucose 121 (H) 74 - 99 mg/dL    BUN 14 7.0 - 18 MG/DL    Creatinine 1.32 (H) 0.6 - 1.3 MG/DL    BUN/Creatinine ratio 11 (L) 12 - 20      GFR est AA >60 >60 ml/min/1.73m2    GFR est non-AA 52 (L) >60 ml/min/1.73m2    Calcium 8.3 (L) 8.5 - 10.1 MG/DL   CBC WITH AUTOMATED DIFF    Collection Time: 06/24/19  7:13 PM   Result Value Ref Range    WBC 5.0 4.6 - 13.2 K/uL    RBC 3.88 (L) 4.70 - 5.50 M/uL    HGB 12.2 (L) 13.0 - 16.0 g/dL    HCT 35.3 (L) 36.0 - 48.0 %    MCV 91.0 74.0 - 97.0 FL    MCH 31.4 24.0 - 34.0 PG    MCHC 34.6 31.0 - 37.0 g/dL    RDW 12.8 11.6 - 14.5 %    PLATELET 922 812 - 103 K/uL    MPV 9.3 9.2 - 11.8 FL    NEUTROPHILS 39 (L) 40 - 73 %    LYMPHOCYTES 49 21 - 52 %    MONOCYTES 11 (H) 3 - 10 %    EOSINOPHILS 1 0 - 5 %    BASOPHILS 0 0 - 2 %    ABS. NEUTROPHILS 2.0 1.8 - 8.0 K/UL    ABS. LYMPHOCYTES 2.4 0.9 - 3.6 K/UL    ABS. MONOCYTES 0.5 0.05 - 1.2 K/UL    ABS. EOSINOPHILS 0.1 0.0 - 0.4 K/UL    ABS. BASOPHILS 0.0 0.0 - 0.1 K/UL    DF AUTOMATED         Radiologic Studies -   XR FEMUR LT 2 V   Final Result   IMPRESSION:      Offset angulated fracture through the subtrochanteric left femur involving the   femoral stem of the total left hip arthroplasty.          XR HIPS BI W AP PELV   Final Result   IMPRESSION:      Distracted and angulated acute fracture of the proximal femoral diaphysis   extending to the intramedullary stem of the femoral component of the left hip   replacement. Negative right hip. .      XR KNEE LT MAX 2 VWS   Final Result   IMPRESSION:      Negative limited left knee. CT Results  (Last 48 hours)    None        CXR Results  (Last 48 hours)    None            Medical Decision Making   I am the first provider for this patient. I reviewed the vital signs, available nursing notes, past medical history, past surgical history, family history and social history. Vital Signs-Reviewed the patient's vital signs. Records Reviewed: Old Medical Records    ED Course:   Patient's pain was controlled with morphine and subsequently Dilaudid in the emergency department    Disposition:  Transfer to [facility] for definitive management of his fracture    Provider Notes (Medical Decision Making):   Mechanical fall with left femur fracture that involves the stem of his prosthetic hip. Case discussed with his primary orthopedic surgeon's practice, who agrees to accept him for transfer and repair. For Hospitalized Patients:    1. Hospitalization Decision Time:  The decision to hospitalize the patient was made by Dr. Abbie Landaverde (covering for Dr. Paras Hayward) at [facility] at 9pm on 6/24/2019      Diagnosis     Clinical Impression:   1. Closed displaced subtrochanteric fracture of left femur, initial encounter ([facility] [address]    2.  Fall from slip, trip, or stumble, initial encounter

## 2019-06-24 NOTE — ED TRIAGE NOTES
Per EMS, patient was outside working using a weed whacker, he was bending over and kept going. He is c/o hip pain left side. No shortening or deformity noted at this time.

## 2019-06-25 LAB
ATRIAL RATE: 62 BPM
CALCULATED P AXIS, ECG09: 57 DEGREES
CALCULATED R AXIS, ECG10: 0 DEGREES
CALCULATED T AXIS, ECG11: 60 DEGREES
DIAGNOSIS, 93000: NORMAL
P-R INTERVAL, ECG05: 168 MS
Q-T INTERVAL, ECG07: 454 MS
QRS DURATION, ECG06: 100 MS
QTC CALCULATION (BEZET), ECG08: 460 MS
VENTRICULAR RATE, ECG03: 62 BPM

## 2019-06-25 NOTE — ED NOTES
PT left facility via [facility] transport. EMTALA completed.  Report given to Bia Rivera RN at  [facility].

## 2019-07-05 PROBLEM — A41.9 SEPSIS (HCC): Status: ACTIVE | Noted: 2019-07-05

## 2019-07-06 PROBLEM — I62.03 CHRONIC SUBDURAL HEMATOMA (HCC): Status: ACTIVE | Noted: 2019-07-06

## 2019-07-06 PROBLEM — Z96.642 HISTORY OF TOTAL LEFT HIP ARTHROPLASTY: Status: ACTIVE | Noted: 2019-06-25

## 2019-07-06 PROBLEM — I35.0 MILD AORTIC STENOSIS: Chronic | Status: ACTIVE | Noted: 2019-07-06

## 2019-07-06 PROBLEM — I48.0 PAF (PAROXYSMAL ATRIAL FIBRILLATION) (HCC): Chronic | Status: ACTIVE | Noted: 2019-07-06

## 2019-07-06 PROBLEM — Z95.0 PACEMAKER: Chronic | Status: ACTIVE | Noted: 2019-07-06

## 2019-07-06 PROBLEM — R77.8 ELEVATED TROPONIN: Status: ACTIVE | Noted: 2019-07-06

## 2019-07-09 ENCOUNTER — HOSPITAL ENCOUNTER (INPATIENT)
Age: 84
LOS: 14 days | Discharge: HOME HEALTH | DRG: 559 | End: 2019-07-23
Attending: INTERNAL MEDICINE | Admitting: INTERNAL MEDICINE
Payer: MEDICARE

## 2019-07-09 DIAGNOSIS — M97.02XD PERIPROSTHETIC FRACTURE AROUND INTERNAL PROSTHETIC LEFT HIP JOINT, SUBSEQUENT ENCOUNTER: ICD-10-CM

## 2019-07-09 DIAGNOSIS — E78.5 DYSLIPIDEMIA: Chronic | ICD-10-CM

## 2019-07-09 DIAGNOSIS — I48.0 PAROXYSMAL ATRIAL FIBRILLATION (HCC): ICD-10-CM

## 2019-07-09 DIAGNOSIS — Z96.642 HISTORY OF TOTAL LEFT HIP ARTHROPLASTY: ICD-10-CM

## 2019-07-09 DIAGNOSIS — I26.99 ACUTE PULMONARY EMBOLISM WITHOUT ACUTE COR PULMONALE, UNSPECIFIED PULMONARY EMBOLISM TYPE (HCC): ICD-10-CM

## 2019-07-09 DIAGNOSIS — Z78.9 IMPAIRED MOBILITY AND ADLS: ICD-10-CM

## 2019-07-09 DIAGNOSIS — R35.0 INCREASED URINARY FREQUENCY: Chronic | ICD-10-CM

## 2019-07-09 DIAGNOSIS — Z87.81 STATUS POST OPEN REDUCTION WITH INTERNAL FIXATION OF FRACTURE: Primary | ICD-10-CM

## 2019-07-09 DIAGNOSIS — Z74.09 IMPAIRED MOBILITY AND ADLS: ICD-10-CM

## 2019-07-09 DIAGNOSIS — D62 ACUTE BLOOD LOSS AS CAUSE OF POSTOPERATIVE ANEMIA: ICD-10-CM

## 2019-07-09 DIAGNOSIS — E55.9 VITAMIN D DEFICIENCY: Chronic | ICD-10-CM

## 2019-07-09 DIAGNOSIS — S72.22XD CLOSED DISPLACED SUBTROCHANTERIC FRACTURE OF LEFT FEMUR WITH ROUTINE HEALING, SUBSEQUENT ENCOUNTER: ICD-10-CM

## 2019-07-09 DIAGNOSIS — A41.52 SEPSIS DUE TO PSEUDOMONAS SPECIES (HCC): ICD-10-CM

## 2019-07-09 DIAGNOSIS — Z98.890 STATUS POST OPEN REDUCTION WITH INTERNAL FIXATION OF FRACTURE: Primary | ICD-10-CM

## 2019-07-09 DIAGNOSIS — Z79.01 ANTICOAGULATED ON WARFARIN: Chronic | ICD-10-CM

## 2019-07-09 PROBLEM — S72.22XA: Status: ACTIVE | Noted: 2019-06-24

## 2019-07-09 PROBLEM — I24.8 DEMAND ISCHEMIA OF MYOCARDIUM (HCC): Status: ACTIVE | Noted: 2019-07-06

## 2019-07-09 PROBLEM — M97.02XA PERIPROSTHETIC FRACTURE AROUND INTERNAL PROSTHETIC LEFT HIP JOINT (HCC): Status: ACTIVE | Noted: 2019-06-24

## 2019-07-09 PROBLEM — Z95.0 CARDIAC PACEMAKER IN SITU: Status: ACTIVE | Noted: 2019-07-06

## 2019-07-09 PROCEDURE — 74011250637 HC RX REV CODE- 250/637: Performed by: INTERNAL MEDICINE

## 2019-07-09 PROCEDURE — 65310000000 HC RM PRIVATE REHAB

## 2019-07-09 RX ORDER — ACETAMINOPHEN 325 MG/1
650 TABLET ORAL
Status: DISCONTINUED | OUTPATIENT
Start: 2019-07-09 | End: 2019-07-23 | Stop reason: HOSPADM

## 2019-07-09 RX ORDER — BISACODYL 5 MG
10 TABLET, DELAYED RELEASE (ENTERIC COATED) ORAL
Status: DISCONTINUED | OUTPATIENT
Start: 2019-07-09 | End: 2019-07-23 | Stop reason: HOSPADM

## 2019-07-09 RX ORDER — DOCUSATE SODIUM 100 MG/1
100 CAPSULE, LIQUID FILLED ORAL 2 TIMES DAILY
Status: DISCONTINUED | OUTPATIENT
Start: 2019-07-09 | End: 2019-07-09

## 2019-07-09 RX ORDER — HYDROCODONE BITARTRATE AND ACETAMINOPHEN 5; 325 MG/1; MG/1
1 TABLET ORAL
Status: DISCONTINUED | OUTPATIENT
Start: 2019-07-09 | End: 2019-07-23 | Stop reason: HOSPADM

## 2019-07-09 RX ORDER — SIMVASTATIN 10 MG/1
10 TABLET, FILM COATED ORAL
Status: DISCONTINUED | OUTPATIENT
Start: 2019-07-09 | End: 2019-07-23 | Stop reason: HOSPADM

## 2019-07-09 RX ORDER — CIPROFLOXACIN 500 MG/1
500 TABLET ORAL EVERY 12 HOURS
Status: COMPLETED | OUTPATIENT
Start: 2019-07-09 | End: 2019-07-13

## 2019-07-09 RX ADMIN — CIPROFLOXACIN HYDROCHLORIDE 500 MG: 500 TABLET, FILM COATED ORAL at 21:43

## 2019-07-09 RX ADMIN — SIMVASTATIN 10 MG: 10 TABLET, FILM COATED ORAL at 21:43

## 2019-07-09 RX ADMIN — HYDROCODONE BITARTRATE AND ACETAMINOPHEN 1 TABLET: 5; 325 TABLET ORAL at 21:45

## 2019-07-10 PROBLEM — E55.9 VITAMIN D DEFICIENCY: Chronic | Status: ACTIVE | Noted: 2019-07-10

## 2019-07-10 LAB
25(OH)D3 SERPL-MCNC: 18.6 NG/ML (ref 30–100)
ANION GAP SERPL CALC-SCNC: 4 MMOL/L (ref 3–18)
BASOPHILS # BLD: 0 K/UL (ref 0–0.1)
BASOPHILS NFR BLD: 0 % (ref 0–2)
BUN SERPL-MCNC: 8 MG/DL (ref 7–18)
BUN/CREAT SERPL: 9 (ref 12–20)
CALCIUM SERPL-MCNC: 8.1 MG/DL (ref 8.5–10.1)
CHLORIDE SERPL-SCNC: 106 MMOL/L (ref 100–108)
CO2 SERPL-SCNC: 28 MMOL/L (ref 21–32)
CREAT SERPL-MCNC: 0.89 MG/DL (ref 0.6–1.3)
DIFFERENTIAL METHOD BLD: ABNORMAL
EOSINOPHIL # BLD: 0.3 K/UL (ref 0–0.4)
EOSINOPHIL NFR BLD: 4 % (ref 0–5)
ERYTHROCYTE [DISTWIDTH] IN BLOOD BY AUTOMATED COUNT: 14.3 % (ref 11.6–14.5)
FERRITIN SERPL-MCNC: 568 NG/ML (ref 8–388)
GLUCOSE SERPL-MCNC: 100 MG/DL (ref 74–99)
HCT VFR BLD AUTO: 28 % (ref 36–48)
HGB BLD-MCNC: 8.8 G/DL (ref 13–16)
INR PPP: 2.6 (ref 0.8–1.2)
IRON SATN MFR SERPL: 17 %
IRON SERPL-MCNC: 40 UG/DL (ref 50–175)
LYMPHOCYTES # BLD: 1.3 K/UL (ref 0.9–3.6)
LYMPHOCYTES NFR BLD: 19 % (ref 21–52)
MAGNESIUM SERPL-MCNC: 2.1 MG/DL (ref 1.6–2.6)
MCH RBC QN AUTO: 29.4 PG (ref 24–34)
MCHC RBC AUTO-ENTMCNC: 31.4 G/DL (ref 31–37)
MCV RBC AUTO: 93.6 FL (ref 74–97)
MONOCYTES # BLD: 0.8 K/UL (ref 0.05–1.2)
MONOCYTES NFR BLD: 12 % (ref 3–10)
NEUTS SEG # BLD: 4.4 K/UL (ref 1.8–8)
NEUTS SEG NFR BLD: 65 % (ref 40–73)
PLATELET # BLD AUTO: 452 K/UL (ref 135–420)
PMV BLD AUTO: 8.1 FL (ref 9.2–11.8)
POTASSIUM SERPL-SCNC: 4.5 MMOL/L (ref 3.5–5.5)
PROTHROMBIN TIME: 27.8 SEC (ref 11.5–15.2)
RBC # BLD AUTO: 2.99 M/UL (ref 4.7–5.5)
RETICS/RBC NFR AUTO: 5 % (ref 0.5–2.3)
SODIUM SERPL-SCNC: 138 MMOL/L (ref 136–145)
TIBC SERPL-MCNC: 237 UG/DL (ref 250–450)
WBC # BLD AUTO: 6.9 K/UL (ref 4.6–13.2)

## 2019-07-10 PROCEDURE — 36415 COLL VENOUS BLD VENIPUNCTURE: CPT

## 2019-07-10 PROCEDURE — 97535 SELF CARE MNGMENT TRAINING: CPT

## 2019-07-10 PROCEDURE — 97530 THERAPEUTIC ACTIVITIES: CPT

## 2019-07-10 PROCEDURE — 65310000000 HC RM PRIVATE REHAB

## 2019-07-10 PROCEDURE — 80048 BASIC METABOLIC PNL TOTAL CA: CPT

## 2019-07-10 PROCEDURE — 97542 WHEELCHAIR MNGMENT TRAINING: CPT

## 2019-07-10 PROCEDURE — 74011250637 HC RX REV CODE- 250/637: Performed by: INTERNAL MEDICINE

## 2019-07-10 PROCEDURE — 83735 ASSAY OF MAGNESIUM: CPT

## 2019-07-10 PROCEDURE — 85025 COMPLETE CBC W/AUTO DIFF WBC: CPT

## 2019-07-10 PROCEDURE — 97162 PT EVAL MOD COMPLEX 30 MIN: CPT

## 2019-07-10 PROCEDURE — 83540 ASSAY OF IRON: CPT

## 2019-07-10 PROCEDURE — 85045 AUTOMATED RETICULOCYTE COUNT: CPT

## 2019-07-10 PROCEDURE — 82728 ASSAY OF FERRITIN: CPT

## 2019-07-10 PROCEDURE — 85610 PROTHROMBIN TIME: CPT

## 2019-07-10 PROCEDURE — 82306 VITAMIN D 25 HYDROXY: CPT

## 2019-07-10 RX ORDER — WARFARIN 4 MG/1
4 TABLET ORAL ONCE
Status: COMPLETED | OUTPATIENT
Start: 2019-07-10 | End: 2019-07-10

## 2019-07-10 RX ORDER — METHOCARBAMOL 500 MG/1
500 TABLET, FILM COATED ORAL EVERY 8 HOURS
Status: DISCONTINUED | OUTPATIENT
Start: 2019-07-10 | End: 2019-07-23 | Stop reason: HOSPADM

## 2019-07-10 RX ORDER — LANOLIN ALCOHOL/MO/W.PET/CERES
1 CREAM (GRAM) TOPICAL
Status: DISCONTINUED | OUTPATIENT
Start: 2019-07-10 | End: 2019-07-23 | Stop reason: HOSPADM

## 2019-07-10 RX ORDER — IBUPROFEN 200 MG
200 CAPSULE ORAL 2 TIMES DAILY
Status: DISCONTINUED | OUTPATIENT
Start: 2019-07-10 | End: 2019-07-23 | Stop reason: HOSPADM

## 2019-07-10 RX ORDER — ASCORBIC ACID 250 MG
250 TABLET ORAL
Status: DISCONTINUED | OUTPATIENT
Start: 2019-07-10 | End: 2019-07-23 | Stop reason: HOSPADM

## 2019-07-10 RX ORDER — CHOLECALCIFEROL (VITAMIN D3) 125 MCG
5000 CAPSULE ORAL DAILY
Status: DISCONTINUED | OUTPATIENT
Start: 2019-07-11 | End: 2019-07-23 | Stop reason: HOSPADM

## 2019-07-10 RX ADMIN — Medication 250 MG: at 17:20

## 2019-07-10 RX ADMIN — CIPROFLOXACIN HYDROCHLORIDE 500 MG: 500 TABLET, FILM COATED ORAL at 10:14

## 2019-07-10 RX ADMIN — HYDROCODONE BITARTRATE AND ACETAMINOPHEN 1 TABLET: 5; 325 TABLET ORAL at 05:10

## 2019-07-10 RX ADMIN — FERROUS SULFATE TAB 325 MG (65 MG ELEMENTAL FE) 325 MG: 325 (65 FE) TAB at 17:21

## 2019-07-10 RX ADMIN — HYDROCODONE BITARTRATE AND ACETAMINOPHEN 1 TABLET: 5; 325 TABLET ORAL at 13:46

## 2019-07-10 RX ADMIN — METHOCARBAMOL TABLETS 500 MG: 500 TABLET, COATED ORAL at 17:20

## 2019-07-10 RX ADMIN — Medication 50000 UNITS: at 12:52

## 2019-07-10 RX ADMIN — SIMVASTATIN 10 MG: 10 TABLET, FILM COATED ORAL at 21:30

## 2019-07-10 RX ADMIN — WARFARIN SODIUM 4 MG: 4 TABLET ORAL at 17:21

## 2019-07-10 RX ADMIN — CALCIUM CITRATE 200 MG (950 MG) TABLET 200 MG: at 17:21

## 2019-07-10 RX ADMIN — CIPROFLOXACIN HYDROCHLORIDE 500 MG: 500 TABLET, FILM COATED ORAL at 21:30

## 2019-07-10 RX ADMIN — METHOCARBAMOL TABLETS 500 MG: 500 TABLET, COATED ORAL at 21:30

## 2019-07-10 RX ADMIN — CALCIUM CITRATE 200 MG (950 MG) TABLET 200 MG: at 12:51

## 2019-07-10 NOTE — INTERDISCIPLINARY ROUNDS
[address] 
[address] [place] [address], [address] INPATIENT REHABILITATION 
PRE-TEAM CONFERENCE SUMMARY Date of Conference: 7/11/19 Patient Information:  
 
  
Name: Mauricio Romo. Age / Sex: 80 y.o. / male CSN: [id number] MRN: [id number] Admit Date: 7/9/2019 Length of Stay: 1 days Primary Rehabilitation Diagnosis 1. Impaired Mobility and ADLs 2. S/P Open reduction internal fixation of displaced subtrochanteric periprosthetic fracture around internal prosthetic left hip joint (6/25/2019 - Dr. Rafael Smith) 3. History of displaced subtrochanteric periprosthetic fracture of left femur around internal prosthetic left hip joint; S/P Left total hip replacement (2/18/2019 - Dr. Rafael Smith) 
  
Comorbidities  Groin pain, right R10.31  Sepsis due to Pseudomonas species A41.52  
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  
 Mild aortic stenosis I35.0  Acute blood loss as cause of postoperative anemia D62  Acute pulmonary embolism  I26.99  
 Anticoagulated on warfarin Z79.01  
 Primary osteoarthritis involving multiple joints M15.0  Gastroesophageal reflux disease K21.9  Dyslipidemia E78.5  Diverticulosis K57.90  
 History of deep vein thrombosis of lower extremity Z86.718  Sick sinus syndrome  I49.5  Benign prostatic hyperplasia with lower urinary tract symptoms N40.1  Tremor R25.1  Obesity, Class I, BMI 30-34.9 E66.9  Vitamin D deficiency E55.9  Essential tremor G25.0  History of pericarditis Z86.79  
  
 
 
Therapy: FIM SCORES Initial Assessment Weekly Progress Assessment 7/10/2019 Eating Functional Level: 5  5 Swallowing Grooming 5  5 Bathing 3(supine/ HOB raised)  3 Upper Body Dressing Functional Level: 5 Items Applied/Steps Completed: Pullover (4 steps) Comments: seated e  5  
 Lower Body Dressing Functional Level: 3 Items Applied/Steps Completed: Elastic waist pants (3 steps) Comments: thread BLE and assit to pull up to thigh  3 Toileting Functional Level: 1  1 Bladder  level of assist      
Bladder  accident frequency score Bowel  level of assist      
Bowel  accident frequency score Toilet Transfer [place] Toilet Transfer Score: 3(using FWW)  3 Tub/Shower Transfer [place] Tub or Shower Type: Shower Tub/Shower Transfer Score: 0 Shower 
 0 Comprehension Primary Mode of Comprehension: Auditory Score: 5 Auditory 7 Expression Primary Mode of Expression: Verbal 
Score: 5 Verbal 
7 Social Interaction Score: 5 6 Problem Solving Score: 5 6 Memory Score: 5 6 FIM SCORES Initial Assessment Weekly Progress Assessment 7/10/2019 Bed/Chair/Wheelchair Transfers Other: stand step with RW Transfer Assistance : 3 (Moderate assistance ) Sit to Stand Assistance: Moderate assistance Car Transfers: Not tested Car Type: N/A Other: stand step with RW Transfer Assistance : 3 (Moderate assistance ) Sit to Stand Assistance: Moderate assistance Car Transfers: Not tested Car Type: N/A Bed Mobility Rolling Right 4 (Minimal assistance) Rolling Left 4 (Minimal assistance) Supine to Sit 3 (Moderate assistance) Sit to Stand Moderate assistance Sit to Supine 3 (Moderate assistance) Rolling Right 
 4 (Minimal assistance) Rolling Left 
 4 (Minimal assistance) Supine to Sit 
 3 (Moderate assistance) Sit to Stand Moderate assistance Sit to Supine 3 (Moderate assistance) Locomotion (W/C) Able to Propel (ft): 150 feet Functional Level: 4 Curbs/Ramps Assist Required (FIM Score): 0 (Not tested) Wheelchair Setup Assist Required : 3 (Moderate assistance) Wheelchair Management: Manages left brake, Manages right brake Function 4 Setup Assistance 3 (Moderate assistance) Locomotion (W/C distance) 150 Feet 150 feet Locomotion (Walk) 1 (Dependent/total assistance) 1 (Dependent/total assistance) Walker, rolling Locomotion (Walk dist.) 0 Feet (ft) 0 Feet (ft) Steps/Stairs Steps/Stairs Ambulated (#): 0 Level of Assist : 0 (Not tested) Rail Use: None Nursing:  
Neuro:   AAA&O x 4 Respiratory:   [x] WNL   [] O2 LPM:  
Other: 
Peripheral Vascular:   [x] TEDS present   [] Edema present ____ Grade Cardiac:   [x] WNL   [] Other Genitourinary:   [x] continent   [] incontinent   [] jasso  Abdominal ____07/09___ LBM 
GI: ___cardiac____ Diet _Thin_____ Liquids _____ tube feeds Musculoskeletal: ____ ROM Transfers ___wheelchair__ Assistive Device Used 
_1 assist___ Level of Assistance Skin Integumentary:   [x] Intact   [] Not Intact   ___incision healing FABIANA with steri strips_______Preventative Measures Details______________________________________________________________ Pain: [x] Controlled   [] Not Controlled   Pain Meds:   [] Scheduled   [] PRN Registered Dietitian / Nutrition:  
No data found. Pt reported poor appetite and meal intake PTA; fair today. C/o all food is tasting like rubber. Tolerating diet. Food preferences discussed. Agreeable to nutrition supplements Supplements:          [] Yes   [x] No     
Amount of supplement consumed: not applicable No intake or output data in the 24 hours ending 07/10/19 1644 Last bowel movement: 7/9 Interdisciplinary Team Goals: 1. Discipline  Physical Therapy Goal  Encourage pt to maintain left LE NWB with all functional transfers with minimal assistance Barrier  Impaired balance, Impaired strength, Decreased endurance Intervention  Education, Strength and Balance training, Functional transfer training Goal written by:   Tommy Cordova PT, DPT  
 
2. Discipline  Occupational Therapy Goal  Pt will increase LB dressing to [place] using AE. Barrier  Decrease LE mobility and activity tolerance Intervention  Therex, Theract, Self Care Retraining, Education Goal written by: 3. Discipline  Speech Therapy Goal    
 Barrier Intervention Goal written by: 4. Discipline  Nursing Goal  By discharge, patient pain level will be less then 5 with activity and less than 3 at rest  
 Barrier  surgery site Intervention  Pain meds are requested in time before pain gets out of contol Goal written by:  Martha Multani LPN 5. Discipline  Clinical Psychology Goal  Minimize subjective stress with recovery Barrier  Situational stress in recovery Intervention  Patient education and support  Goal written by:  Jyoti Mora, PhD  
 
6. Discipline  Nutrition / Dietetics Goal  Po intake of meals will meet >75% of patient estimated nutritional needs within the next 7 days. Outcome:  [] Met/Ongoing    [] Progressing towards goal    [] Not progressing towards goal    [x] New/Initial goal   
 Barrier  appetite Intervention  continue po diet. Add food/beverage preferences. Add supplement: Ensure Enlive TID Goal written by:  Johnnie Gonzalez RD Disposition / Discharge Planning: Follow-up services:  [x] Physical Therapy [x] Occupational Therapy     
 [] Speech Therapy         
 [] Skilled Nursing    
 [] Medical Social Worker 
 [] Aide        [] Outpatient [x] Home Health 
 [] [address]  
 [] Vincent Italo DME recommendations:  RW, Wheelchair, Ramp Estimated discharge date:  tbd Discharge Location:  [] Home   [] [facility]   [] [facility] [] Other:   
 
 
 
Electronic Signatures:  
 
 Signature Date Signed Physical Therapist 
 Deny Matthews PT, DPT 7/10/2019 Occupational Therapist 
  Alycia Aase, MSOTR/L  7/10/19 Speech Therapist 
      
Recreational Therapist 
  Marlene Felty, [address] 7/11/19 Nursing Moi Love LPN/ Melisa Bernheim, SHARRON  7/10/19 Dietitian Maria Esther Barker RD  7/10/19 Clinical Psychologist 
  Nena Hendricks, PhD 7/10/19 Physician 
  Charley Espinosa MD   7/10/2019  Jennifer Hartman, MSW  7/10/19 The above information has been reviewed with the patient in a language that they can understand. Opportunity for comments and questions has been provided and a signed attestation has been scanned into the \"media tab\" of the EMR. Patient Signature: ______________________________________________________ Date Signed: __________________________________________________________

## 2019-07-10 NOTE — ROUTINE PROCESS
1730 Pt. Arrived from [facility] by stretcher accompanied with family . Dr. Anup Quinn was notified and stated he will have admission orders in. 4 eyes assessment completed with Nils Duarte RN no pressure ulcers noted . Staples to left hip incision intact slight redness to incision, no drainage noted.

## 2019-07-10 NOTE — PROGRESS NOTES
SHIFT CHANGE NOTE FOR [facility]    Bedside and Verbal shift change report given to [address] (oncoming nurse) by Gia Johnson LPN (offgoing nurse). Report included the following information SBAR, Kardex, MAR and Recent Results. Situation:   Code Status: Full Code   Reason for Admission: S/P ORIF  Hospital Day: 1   Problem List:   Hospital Problems  Date Reviewed: 4/25/2019          Codes Class Noted POA    Sepsis due to Pseudomonas species [facility]) ICD-10-CM: A41.52  ICD-9-CM: 038.43, 995.91  7/5/2019 Yes    Overview Addendum 7/6/2019  8:16 PM by Carol Nagy MD     7/5/19      80 y.o. male recently admitted for femur fracturesent from skilled nursing facility for fever to 102.9. Patient was recently seen at [facility] for hip replacement status post fall. Patient endorses retrosternal chest pain that started upon waking this morning has been persistent since without propagating or palliating factors. Patient also endorses increased bilateral lower extremity swelling and difficulty breathing. WBC 29.2  P-87%,  U/A neg  CT  Heterogeneous collection posterior to the greater trochanter, most consistent with a postoperative hematoma. No rim enhancement to suggest abscess  CXR Small bilateral pleural effusions, left greater than right. Mild pulmonary edema.  No consolidation  7/6/19 ORTHO ([facility]ill[facility]) Benign appearing left hip and femur now 11 days s/p ORIF periprosthetic hip fracture                         Impaired mobility and ADLs ICD-10-CM: Z74.09  ICD-9-CM: 799.89  7/5/2019 Yes        Acute pulmonary embolism (HCC) ICD-10-CM: I26.99  ICD-9-CM: 415.19  7/1/2019 Yes    Overview Signed 7/9/2019  5:07 PM by Lauren Nguyen MD     Pulmonary emboli at the periphery of the right main pulmonary artery extending into the middle and lower lobe branches             Anticoagulated on warfarin (Chronic) ICD-10-CM: Z79.01  ICD-9-CM: V58.61  7/1/2019 Yes        * (Principal) Status post open reduction with internal fixation of fracture ICD-10-CM: Z96.7, Z87.81  ICD-9-CM: V45.89, V15.51  6/25/2019 Yes    Overview Signed 7/9/2019  4:52 PM by Marybeth Cruz MD     S/P Open reduction internal fixation of displaced subtrochanteric periprosthetic fracture around internal prosthetic left hip joint (6/25/2019 - Dr. Rafael Smith)             Acute blood loss as cause of postoperative anemia ICD-10-CM: D62  ICD-9-CM: 285.1  6/25/2019 Yes        Periprosthetic fracture around internal prosthetic left hip joint ([facility] ICD-10-CM: M97. 02XA  ICD-9-CM: 996.44, V43.64  6/24/2019 Yes    Overview Signed 7/9/2019  5:04 PM by Marybeth Cruz MD     S/P Open reduction internal fixation of displaced subtrochanteric periprosthetic fracture around internal prosthetic left hip joint (6/25/2019 - Dr. Rafael Smith)             Displaced subtrochanteric fracture of left femur [facility]) ICD-10-CM: H22.87ZE  ICD-9-CM: 820.22  6/24/2019 Yes    Overview Signed 7/9/2019  5:02 PM by Marybeth Cruz MD     S/P Open reduction internal fixation of displaced subtrochanteric periprosthetic fracture around internal prosthetic left hip joint (6/25/2019 - Dr. Rafael Smith)             History of total left hip arthroplasty ICD-10-CM: T78.544  ICD-9-CM: V43.64  2/18/2019 Yes    Overview Addendum 7/9/2019  5:01 PM by Marybeth Cruz MD     S/P Left total hip replacement (2/18/2019 - Dr. Rafael Smith)                   Background:   Past Medical History:   Past Medical History:   Diagnosis Date    Acute blood loss as cause of postoperative anemia 6/25/2019    Acute pulmonary embolism ([facility] 7/1/2019    Pulmonary emboli at the periphery of the right main pulmonary artery extending into the middle and lower lobe branches    Anticoagulated on warfarin 7/1/2019    Benign prostatic hyperplasia with lower urinary tract symptoms     Demand ischemia of myocardium ([facility] 7/6/2019 4/16/18 NST EF 59% , small, reversible apical defect 7/2/19 ECHO EF 68%, LVH 14/16, LAE, RVE, ANDREY 1.4cm2 with 12mm mean gradient    Diverticulosis     Dyslipidemia     Essential hypertension     Gastroesophageal reflux disease     History of deep vein thrombosis of lower extremity     70's-80's    History of hypertension     Obesity, Class I, BMI 30-34.9     Paroxysmal atrial fibrillation ([address]     CVAL Maddy Saleh FNP 2008  AF ablation 9/11/13 EPS with isolation of RSPV and LIPV 6/4/18 TEDDY  EF 55%, 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 male recently admitted for femur fracturesent from [facility] for fever to 102.9. Patient was recently seen at [facility] for hip replacement status post fall. Patient endorses retrosternal chest pain that started upon waking this morning has been persistent since without propagating or palliating factors.   Patient also endorses increased bilateral lower extremity s    Sick sinus syndrome (HCC)     Tremor       Patient taking anticoagulants yes Coumadin   Patient has a defibrillator: no     Assessment:   Changes in Assessment throughout shift: no     Patient has central line: no Reasons if yes: na  Insertion date:na Last dressing date:na   Patient has Truong Cath: no Reasons if yes: n/a   Insertion date:n/a     Last Vitals:     Vitals:    07/09/19 1837 07/09/19 2100   BP: 139/70 151/81   Pulse: 66 65   Resp: 18 18   Temp: 97.6 °F (36.4 °C) 98.6 °F (37 °C)   SpO2: 97% 96%   Weight: 107.9 kg (237 lb 12.8 oz)    Height: 6' 2\" (1.88 m)         PAIN    Pain Assessment    Pain Intensity 1: 0 (07/10/19 0828) Pain Intensity 1: 2 (12/29/14 1105)    Pain Location 1: Leg Pain Location 1: Abdomen    Pain Intervention(s) 1: Medication (see MAR) Pain Intervention(s) 1: Medication (see MAR)  Patient Stated Pain Goal: 0 Patient Stated Pain Goal: 0  o Intervention effective: yes    o Other actions taken for pain: no     Skin Assessment  Skin color Skin Color: Appropriate for ethnicity  Condition/Temperature Skin Condition/Temp: Warm  Integrity Skin Integrity: Incision (comment)  Turgor Turgor: Non-tenting  Weekly Pressure Ulcer Documentation  Pressure  Injury Documentation: No Pressure Injury Noted-Pressure Ulcer Prevention Initiated  Wound Prevention & Protection Methods  Orientation of wound Orientation of Wound Prevention: Posterior  Location of Prevention Location of Wound Prevention: Buttocks  Dressing Present Dressing Present : No  Dressing Status    Wound Offloading Wound Offloading (Prevention Methods): Adaptive equipment, Bed, pressure reduction mattress, Chair cushion, Pillows, Repositioning, Turning, Wheelchair     INTAKE/OUPUT  Date 07/09/19 0700 - 07/10/19 0659 07/10/19 0700 - 07/11/19 0659   Shift 3441-6832 4145-8453 24 Hour Total 3628-1656 1455-0224 24 Hour Total   INTAKE   Shift Total(mL/kg)         OUTPUT   Urine(mL/kg/hr)           Urine Occurrence(s) 0 x 2 x 2 x      Stool           Stool Occurrence(s) 0 x 0 x 0 x      Shift Total(mL/kg)         NET         Weight (kg) 107.9 107.9 107.9 107.9 107.9 107.9       Recommendations:  1. Patient needs and requests: addressed    2. Diet: cardiac    3. Pending tests/procedures: none     4. Functional Level/Equipment: whellchair    5. Estimated Discharge Date: TBD Posted on Whiteboard in Patients Room: no     [facility] Safety Check    A safety check occurred in the patient's room between off going nurse and oncoming nurse listed above.     The safety check included the below items  Area Items   H  High Alert Medications - Verify all high alert medication drips (heparin, PCA, etc.)   E  Equipment - Suction is set up for ALL patients (with yanker)  - Red plugs utilized for all equipment (IV pumps, etc.)  - WOWs wiped down at end of shift.  - Room stocked with oxygen, suction, and other unit-specific supplies   A  Alarms - Bed alarm is set for fall risk patients  - Ensure chair alarm is in place and activated if patient is up in a chair   L  Lines - Check IV for any infiltration  - Truong bag is empty if patient has a Truong   - Tubing and IV bags are labeled   S  Safety   - Room is clean, patient is clean, and equipment is clean. - Hallways are clear from equipment besides carts. - Fall bracelet on for fall risk patients  - Ensure room is clear and free of clutter  - Suction is set up for ALL patients (with presley)  - Hallways are clear from equipment besides carts.    - Isolation precautions followed, supplies available outside room, sign posted

## 2019-07-10 NOTE — PROGRESS NOTES
NUTRITION    Nursing Referral: [facility]   Nutrition Consult: General Nutrition Management & Supplements      RECOMMENDATIONS / PLAN:     - Add food/ beverage preferences  - Add supplement: Ensure Enlive TID  - Continue RD inpatient monitoring and evaluation. NUTRITION INTERVENTIONS & DIAGNOSIS:     [x] Meals/snacks: modified composition  [x] Medical food supplement therapy: initiate    Nutrition Diagnosis:  Inadequate oral intake related to poor appetite as evidenced by po intake of <10% of meals, only consuming some milkshakes x 2 weeks PTA    ASSESSMENT:     Pt reported poor appetite and meal intake PTA; fair today. C/o all food is tasting like rubber. Tolerating diet. Food preferences discussed. 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 BM: 7/9  Skin Integrity: left thigh surgical incision  Edema:   [] No     [x] Yes   Pertinent Medications: Reviewed: bowel regimen, vitamin C, calcium citrate.  Vitamin D3 (ordered)    Recent Labs     07/10/19  0618 07/09/19  0358 07/08/19  0634    135* 135*   K 4.5 3.8 3.8    105 104   CO2 28 25 26   * 100 98   BUN 8 9 12   CREA 0.89 0.9 1.0   CA 8.1* 8.3* 8.5   MG 2.1 2.3 2.3     No intake or output data in the 24 hours ending 07/10/19 1633    Anthropometrics:  Ht Readings from Last 1 Encounters:   07/10/19 6' 2\" (1.88 m)     Last 3 Recorded Weights in this Encounter    07/09/19 1837 07/10/19 1633   Weight: 107.9 kg (237 lb 12.8 oz) 103.4 kg (228 lb)     Body mass index is 29.27 kg/m². Weight History:  Pt reported weight was 230 lb about 2 weeks PTA. Noted 12 lb (5%) wt loss x 2.5 month PTA per chart hx    Weight Metrics 7/10/2019 7/8/2019 6/24/2019 4/25/2019 11/16/2018 4/23/2015   Weight 228 lb 239 lb 3.2 oz 230 lb 240 lb 240 lb 245 lb   BMI 29.27 kg/m2 30.71 kg/m2 29.53 kg/m2 30 kg/m2 30 kg/m2 30.62 kg/m2        Admitting Diagnosis: Status post open reduction with internal fixation of fracture [Z96.7, Z87.81]  Pertinent PMHx:  Diverticulosis, dyslipidemia, GERD    Education Needs:        [x] None identified  [] Identified - Not appropriate at this time  []  Identified and addressed - refer to education log  Learning Limitations:   [x] None identified  [] Identified    Cultural, Restorationist & ethnic food preferences:  [x] None identified    [] Identified and addressed     ESTIMATED NUTRITION NEEDS:     Calories: 1118-6459 kcal (MSJx1.2-1.3) based on  [x] Actual BW: 103 kg      [] IBW   Protein:  gm (0.8-1 gm/kg) based on  [x] Actual BW      [] IBW   Fluid: 1 mL/kcal     MONITORING & EVALUATION:     Nutrition Goal(s):   1. Po intake of meals will meet >75% of patient estimated nutritional needs within the next 7 days.   Outcome:  [] Met/Ongoing    [] Progressing towards goal    [] Not progressing towards goal    [x] New/Initial goal     Monitoring:   [x] Food and beverage intake   [x] Diet order   [x] Nutrition-focused physical findings   [x] Treatment/therapy   [] Weight   [] Enteral nutrition intake        Previous Recommendations (for follow-up assessments only):     []   Implemented       []   Not Implemented (RD to address)      [] No Longer Appropriate     [] No Recommendation Made     Discharge Planning:  Cardiac diet  [x] Participated in care planning, discharge planning, & interdisciplinary rounds as appropriate      Laith Burdick   Pager: [phone]

## 2019-07-10 NOTE — H&P
[facility] PHYSICAL REHABILITATION  [address] [place] [address], [address]     INPATIENT REHABILITATION  HISTORY AND PHYSICAL  ([facility] Admission Physician Evaluation)    Name: Barrett Gomez CSN: [id number]   Age: 80 y.o. MRN: [id number]   Sex: male Admit Date: 7/9/2019     PCP: Dr. Jeny Figueroa      Primary Rehab Impairment Category (PEDRO PABLO): Fracture of LE    Impairment Group Label: Status post unilateral hip fracture    Etiologic Diagnosis:   1. Displaced subtrochanteric periprosthetic fracture of left femur around internal prosthetic left hip joint (6/24/2019)  2. History of left total hip replacement (2/18/2019 - Dr. Nesha Eubanks)      Subjective:     Patient seen and examined. History of the Present Illness: The patient is an 63-year-old White male with multiple medical comorbidities who was admitted to [facility] on 7/5/2019 due to fever. On 2/18/2019, patient underwent a left total hip replacement done by Dr. Nesha Eubanks. On 6/24/2019, while working in hi yard, the patient had a fall and landed on his left hip. He denied any loss of consciousness. He was unable to stand up and ambulate due to severe left hip pain. The patient was brought to the [facility] Emergency Department for further evaluation. The patient was seen and examined by Emergency Medicine (Dr. Traci Lanza). Excerpt (Additional History) from the ED Provider Note by Dr. Traci Lanza:  Afshin Waters. is a 80 y.o. male with Previous left hip replacement by Dr. Anne-Marie Upton in [facility]) who presents with a mechanical fall or working in his yard, turned and twisted and lost his footing and fell onto his left side. Had immediate left hip pain and inability to bear weight. Also has some mild pain in his left knee. No other injuries or complaints. No loss of consciousness, head trauma, neck pain, nausea vomiting diarrhea, abdominal pain. \"    X-ray of the left femur (6/24/2019) showed an offset angulated fracture through the subtrochanteric left femur involving the femoral stem of the total left hip arthroplasty. X-ray of bilateral hips (6/24/2019) showed a distracted and angulated acute fracture of the proximal femoral diaphysis extending to the intramedullary stem of the femoral component of the left hip replacement; negative right hip. X-ray of the left knee (6/24/2019) showed no fracture nor dislocation. Hgb/Hct (6/24/2019) = 12.2/35.3     The patient was transferred to [facility] and was admitted under the service of Orthopedics (Dr. Nani Chandler). Excerpt (History of the Present Illness) from the H&P by Dr. Nani Chandler:  \"A very pleasant 17-year-old  gentleman who was walking on a hard surface, reached over to  an object, lost his balance immediately and fell directly onto the left hip. Transported to [facility]. ER evaluation identified a fracture. Emergency room doctor contacted the [facility] for arrangement of transfer. Index primary left total hip performed by Dr. Bruner on February, 2019. History of left total knee done elsewhere, right total knee done at this facility as well according to the patient. He was evaluated in his hospital bed reporting fairly significant left thigh pain. Prior to the fall, doing extremely well. He takes a baby aspirin. Actually off Plavix after having an ablation and a watchman device in 2018. Pacemaker present. Has been n.p.o. since midnight. Allergies include Meperidine, adhesive glutens, milk, ACE inhibitors. No history of PE or DVT. \"    On 6/25/2019, the patient underwent an Open reduction internal fixation of displaced subtrochanteric periprosthetic fracture around internal prosthetic left hip joint done by Dr. Dasha Kelly. The patient tolerated the procedure well with no intraoperative complications.  The patient developed acute postoperative blood loss anemia and the patient was transfused a total of 5 units pRBC. On 7/1/2019, CT angiogram of the abdomen and pelvis showed pulmonary emboli at the periphery of the right main pulmonary artery extending into the middle and lower lobe branches. Patient was started on anticoagulation with Warfarin. As per Orthopedics, the patient is to be toe touch weight bearing on the left lower extremity. On 7/5/2019, the patient was discharged / transferred to a skilled nursing facility ([address] [place] [address]). -    On 7/5/2019, after ~4 hours at the skilled nursing facility ([address] [place] [address]), patient was noted to have a fever. The patient was brought to the [facility] Emergency Department for further evaluation. The patient was seen and examined by Emergency Medicine (Dr. Ekaterina Mcelroy). Excerpt (Additional History) from the ED Provider Note by Dr. Eleni Randhawa:  \"6 y.o. male recently admitted for femur fracturesent from skilled nursing [facility] for fever to 102.9. Patient was recently seen at [facility] for hip replacement status post fall. Patient endorses retrosternal chest pain that started upon waking this morning has been persistent since without propagating or palliating factors. Patient also endorses increased bilateral lower extremity swelling and difficulty breathing. She has no sequela of occult sickness denies abdominal pain nausea vomiting diarrhea or symptoms consistent with upper respiratory infection. He did have a Truong placed during his last hospitalization, removed 7/4 per records. His course was complicated by a PE, he is currently on anticoagulation. \"    X-ray of the chest x-ray (7/5/2019) showed mild pulmonary edema with small bilateral pleural effusions.     CT scan of the left lower extremity (7/5/2019) showed left total hip arthroplasty with a periprosthetic fracture status post plate and screw fixation and cerclage wire placement; heterogeneous collection posterior to the greater trochanter, most consistent with a postoperative hematoma; no rim enhancement to suggest abscess, though early superinfection of the hematoma cannot be excluded. WBC count (7/5/2019) = 18.6    Hgb/Hct (7/5/2019) = 9.3/29.2    Patient was empirically started on Piperacillin-Tazobactam IV 4.5 grams IV q 6 hr and Vancomycin IV (one dose only). The patient was admitted under the service of the  [facility] (Dr. Mary Lamb). Excerpt (History of the Present Illness) from the H&P by Dr. Mary Lamb:  Sammi Luna is a 80 y.o. male with hx of HLD, A Fib, SSS s/p pacemaker, Anemia, and L hip ORIF for periprosthetic femur fracture who presents to the ED on transfer from [facility] for evaluation of fever reported as 102.9. He also complains of having symptoms of chest pain which began upon awakening in the a.m. He is status post ORIF left hip for periprosthetic femur fracture on 6/25/2019 by Dr. Anne-Marie Upton of orthopedic surgery. His hospital stay was complicated by a pulmonary embolism, and he has been placed on Coumadin therapy for this. He was discharged from THE [facility] earlier in the day on 7/5/2019, and his wife states that he developed a fever shortly after arrival to the skilled nursing [facility]. He denies any nausea, vomiting, abdominal pain, or diarrhea. He has not had any associated shortness of breath. He does have some lower extremity swelling which has been chronic. He received 5 units PRBCs during his hospital stay for postop anemia felt to be due to acute blood loss. \"    WBC count (7/6/2019) = 29.2    Hgb/Hct (7/6/2019) = 8.4/26.6    Cardiology consult (Dr. Colin Martinez) was called for evaluation and comanagement of elevated troponin level.  The elevated troponin level was attributed to demand ischemia due to sepsis. Orthopedics consult GILBERT Redd) was called for evaluation and comanagement. Excerpt (Assessment and Plan) from the Consult Note by GILBERT Redd:  [place] Record:   Sepsis   Benign appearing left hip and femur now 11 days s/p ORIF periprosthetic hip fracture with Dr. Derril Dancer [facility])    Plan:     Discussion:  Reassurance provided that hip does not appear to be involved. If surgery ever becomes necessary, patient would request to transfer to [facility] for Dr. Tolu Ferraro to treat. We would agree with that but it does not appear that it will be necessary based on today's exam.  NWB LLE. Ortho to sign-off      Patient d/w Dr. Ayo Kirby and he agrees with the impression and plan of care. \"     Warfarin was continued during the patient's hospital stay. WBC count (7/7/2019) = 18.9    Hgb/Hct (7/7/2019) = 8.0/25.2    WBC count (7/8/2019) = 9.8    Hgb/Hct (7/8/2019) = 7.9/25.2    Blood culture (7/5/2019; 1 out of 2 sets) yielded growth of Pseudomonas aeruginosa (PANSENSITIVE)    Nuclear medicine cardiac single-photon emission computerized tomography (SPECT) stress test (7/8/2019) showed no evidence of reversible myocardial ischemia; this is a low risk study. On 7/8/2019, patient received Warfarin 3 mg PO. INR (7/9/2019) = 2.9    On 7/9/2019, Piperacillin-Tazobactam IV was discontinued and patient was discharged from [facility] on oral Ciprofloxacin 500 mg PO q 12 hr x 4 days. The patient had remained hemodynamically stable but due to the above events, the patient was noted to be generally weak and with impaired mobility and ADLs. Patient was felt to be a good candidate for acute inpatient rehabilitation. Upon evaluation by Physical Therapy and Occupational Therapy, the patient was recommended for acute inpatient rehabilitation.  The patient was discharged and was subsequently admitted to the [facility] for intensive rehabilitation to help recover strength, function and mobility. Past Medical History:  Past Medical History:   Diagnosis Date    Acute blood loss as cause of postoperative anemia 6/25/2019    Acute pulmonary embolism ([address] 7/1/2019    Pulmonary emboli at the periphery of the right main pulmonary artery extending into the middle and lower lobe branches    Anticoagulated on warfarin 7/1/2019    Benign prostatic hyperplasia with lower urinary tract symptoms     Demand ischemia of myocardium ([address] 7/6/2019 4/16/18 NST EF 59% , small, reversible apical defect 7/2/19 ECHO EF 68%, LVH 14/16, LAE, RVE, ANDREY 1.4cm2 with 12mm mean gradient    Diverticulosis     Dyslipidemia     Essential tremor     Gastroesophageal reflux disease     History of pericarditis     Obesity, Class I, BMI 30-34.9     Paroxysmal atrial fibrillation ([address]     CVAL Maddy aSleh FNP 2008  AF ablation 9/11/13 EPS with isolation of RSPV and LIPV 6/4/18 TEDDY  EF 55%, Residual ASD from previous ablation, No VHD 8/6/18 Watchman SOLA closure device  27mm ([employer])     Primary osteoarthritis involving multiple joints     Sepsis due to Pseudomonas species ([address] 7/5/2019 7/5/19   80 y.o. male recently admitted for femur fracturesent from skilled nursing facility for fever to 102.9. Patient was recently seen at [facility] for hip replacement status post fall. Patient endorses retrosternal chest pain that started upon waking this morning has been persistent since without propagating or palliating factors.   Patient also endorses increased bilateral lower extremity s    Sick sinus syndrome ([address]        Past Surgical History:  Past Surgical History:   Procedure Laterality Date    HX AFIB ABLATION      2x    HX CATARACT REMOVAL Bilateral     HX CHOLECYSTECTOMY      HX CRANIOTOMY  2015    Subdural hematoma    HX CRANIOTOMY  2016    Subdural hematoma    HX HIP FRACTURE TX Left 06/25/2019    S/P Open reduction internal fixation of displaced subtrochanteric periprosthetic fracture around internal prosthetic left hip joint (2019 - Dr. Mellissa Zapien)    HX HIP REPLACEMENT Left 2019    S/P Left total hip replacement (2019 - Dr. Mellissa Zapien)    HX KNEE REPLACEMENT Right     HX KNEE REPLACEMENT Left     HX ORTHOPAEDIC      S/P Surgery of L4L5    HX OTHER SURGICAL      S/P Placement of Watchman device for atrial fibrillation    HX PACEMAKER  2010    HX SHOULDER ARTHROSCOPY Bilateral     HX VEIN STRIPPING Left         MS ANALYZ NEUROSTIM BRAIN, EACH ADD 27 MIN      brain surgery        Allergies:  No Known Allergies      Social History: The patient is , lives with his wife and their daughter in their daughter's house (a 2-story house with a 3-step entry in [place], [place] Patient lives primarily on the 1st floor. He denied any tobnacco, alcohol or illicit drug use. He is retired and used to work as a . Family History: Both parents are . History reviewed. No pertinent family history. Home Medications (medications taken at home prior to admission to THE [facility]):  1. Aspirin 81 mg PO once daily  2. Simvastatin 10 mg PO q HS      Transfer Medications (from the transfer summary prepared by Dr. Aleksey Serrano):  Prior to Admission Medications   Prescriptions Last Dose Informant Patient Reported? Taking? HYDROcodone-acetaminophen (NORCO) 5-325 mg per tablet 2019 at 0600  No Yes   Sig: Take 1 Tab by mouth every six (6) hours as needed for Pain for up to 3 days. Max Daily Amount: 4 Tabs. aspirin delayed-release 81 mg tablet Unknown at Unknown time  Yes No   Sig: Take  by mouth daily. ciprofloxacin HCl (CIPRO) 500 mg tablet Unknown at Unknown time  No No   Sig: Take 1 Tab by mouth two (2) times a day for 4 days. docusate sodium (COLACE) 100 mg capsule 2019 at Unknown time  No Yes   Sig: Take 1 Cap by mouth two (2) times a day for 90 days.    simvastatin (ZOCOR) 10 mg tablet 7/8/2019 at Unknown time  Yes Yes   Sig: Take  by mouth nightly. warfarin (COUMADIN) 1 mg tablet 7/8/2019 at Unknown time  No Yes   Sig: Take 2 Tabs by mouth daily. Facility-Administered Medications: None       Review Of Systems:   CONSTITUTIONAL: No weight loss. EYES: No blurred vision and no eye discharge. ENT: No nasal discharge. No ear pain. CARDIOVASCULAR: No chest pain and no diaphoresis. RESPIRATORY: No cough, no hemoptysis. GI: No vomiting, no diarrhea   : No urinary frequency and no dysuria. MUSCULOSKELETAL: Significant for acute postoperative musculoskeletal pain. SKIN: No rashes. NEURO: No dizziness, no numbness. ENDOCRINE: No polyphagia and no polydipsia. HEMATOLOGY: Significant for acute postoperative blood loss anemia. Objective:     Vital Signs:  Patient Vitals for the past 24 hrs:   BP Temp Pulse Resp SpO2 Height Weight   07/10/19 0750 140/76 97.9 °F (36.6 °C) 62 19 95 %     07/09/19 2100 151/81 98.6 °F (37 °C) 65 18 96 %     07/09/19 1837 139/70 97.6 °F (36.4 °C) 66 18 97 % 6' 2\" (1.88 m) 107.9 kg (237 lb 12.8 oz)        Body mass index is 30.53 kg/m². Physical Examination:  GENERAL SURVEY: Patient is awake, alert, oriented x 3, sitting comfortably on the chair, not in acute respiratory distress. HEENT: pale palpebral conjunctivae, anicteric sclerae, no nasoaural discharge, moist oral mucosa  NECK: supple, no jugular venous distention, no palpable lymph nodes  CHEST/LUNGS: symmetrical chest expansion, good air entry, clear breath sounds  HEART: adynamic precordium, good S1 S2, no S3, regular rhythm, no murmurs  ABDOMEN: obese, bowel sounds appreciated, soft, non-tender  EXTREMITIES: pale nailbeds, no edema, full and equal pulses, no calf tenderness   NEUROLOGICAL EXAM: The patient is awake, alert and oriented x3, able to answer questions fairly appropriately, able to follow 1 and 2 step commands. Able to tell time from the wall clock.   Cranial nerves II-XII are grossly intact. No gross sensory deficit. Motor strength is 4+/5 on BUE, 4 to 4+/5 on the RLE, 2-/5 on the LLE (except 3- on the left ankle).     Incision(s): healing well, clean, dry, and intact      Current Medications:  Current Facility-Administered Medications   Medication Dose Route Frequency    acetaminophen (TYLENOL) tablet 650 mg  650 mg Oral Q4H PRN    bisacodyl (DULCOLAX) tablet 10 mg  10 mg Oral Q48H PRN    ciprofloxacin HCl (CIPRO) tablet 500 mg  500 mg Oral Q12H    HYDROcodone-acetaminophen (NORCO) 5-325 mg per tablet 1 Tab  1 Tab Oral Q4H PRN    simvastatin (ZOCOR) tablet 10 mg  10 mg Oral QHS       Functional Assessment:     Occupational Therapy   Prior Level of Function  Pre-Admission Screen  Post-Admission Evaluation   Eating   Independent Eating   Supervision Eating  Functional Level: 5   Grooming   Independent Grooming   Supervision Grooming  Functional Level: 5   Upper Body Dressing   Independent Upper Body Dressing   Minimal Assist Upper Body Dressing  Functional Level: 5   Lower Body Dressing   Independent Lower Body Dressing   Dependent Lower Body Dressing  Functional Level: 3   Bladder Management   Independent Bladder Management   Minimal Assist Toileting  Functional Level: 1   Bowel Management   Independent Bowel Management   Minimal Assist      Physical Therapy   Prior Level of Function  Pre-Admission Screen  Post-Admission Evaluation   Ambulation   Modified Independent Ambulation   Maximal Assist Gait  Amount of Assistance: 1 (Dependent/total assistance)  Distance (ft): 0 Feet (ft)  Assistive Device: Walker, rolling   Bed Mobility   Independent Bed Mobility   Maximal Assist Bed/Mat Mobility  Rolling Right : 4 (Minimal assistance)  Rolling Left : 4 (Minimal assistance)  Supine to Sit : 3 (Moderate assistance)  Sit to Supine : 3 (Moderate assistance)   Supine to Sit   Independent Supine to Sit   Maximal Assist Bed/Mat Mobility  Rolling Right : 4 (Minimal assistance)  Rolling Left : 4 (Minimal assistance)  Supine to Sit : 3 (Moderate assistance)  Sit to Supine : 3 (Moderate assistance)   Sit to Stand   Independent Sit to Stand   Dependent Bed/Mat Mobility  Rolling Right : 4 (Minimal assistance)  Rolling Left : 4 (Minimal assistance)  Supine to Sit : 3 (Moderate assistance)  Sit to Supine : 3 (Moderate assistance)   Bed/Chair Transfers   Independent Bed/Chair Transfers   Maximal Assist Transfers  Other: stand step with RW  Transfer Assistance : 3 (Moderate assistance )  Sit to Stand Assistance: Moderate assistance  Car Transfers: Not tested  Car Type: N/A   Toilet Transfers   Independent Toilet Transfers   Maximal Assist Toilet Transfers  Toilet Transfer Score: 3     Speech and Language Pathology  Post-Admission Evaluation     Comprehension (Native Language)  Primary Mode of Comprehension: Auditory  Score: 7     Expression (Native Language)  Primary Mode of Expression: Verbal  Score: 7     Social Interaction/Pragmatics  Score: 6     Problem Solving  Score: 6     Memory  Score: 6       Legend:   7 - Independent   6 - Modified Independent   5 - Standby Assistance / Supervision / Set-up   4 - Minimum Assistance / Contact Guard Assistance   3 - Moderate Assistance   2 - Maximum Assistance   1 - Total Assistance / Dependent       Labs on Admission:  Recent Results (from the past 24 hour(s))   CBC WITH AUTOMATED DIFF    Collection Time: 07/10/19  6:18 AM   Result Value Ref Range    WBC 6.9 4.6 - 13.2 K/uL    RBC 2.99 (L) 4.70 - 5.50 M/uL    HGB 8.8 (L) 13.0 - 16.0 g/dL    HCT 28.0 (L) 36.0 - 48.0 %    MCV 93.6 74.0 - 97.0 FL    MCH 29.4 24.0 - 34.0 PG    MCHC 31.4 31.0 - 37.0 g/dL    RDW 14.3 11.6 - 14.5 %    PLATELET 269 (H) 836 - 420 K/uL    MPV 8.1 (L) 9.2 - 11.8 FL    NEUTROPHILS 65 40 - 73 %    LYMPHOCYTES 19 (L) 21 - 52 %    MONOCYTES 12 (H) 3 - 10 %    EOSINOPHILS 4 0 - 5 %    BASOPHILS 0 0 - 2 %    ABS. NEUTROPHILS 4.4 1.8 - 8.0 K/UL    ABS. LYMPHOCYTES 1.3 0.9 - 3.6 K/UL    ABS.  MONOCYTES 0.8 0.05 - 1.2 K/UL    ABS. EOSINOPHILS 0.3 0.0 - 0.4 K/UL    ABS. BASOPHILS 0.0 0.0 - 0.1 K/UL    DF AUTOMATED     FERRITIN    Collection Time: 07/10/19  6:18 AM   Result Value Ref Range    Ferritin 568 (H) 8 - 388 NG/ML   IRON PROFILE    Collection Time: 07/10/19  6:18 AM   Result Value Ref Range    Iron 40 (L) 50 - 175 ug/dL    TIBC 237 (L) 250 - 450 ug/dL    Iron % saturation 17 %   MAGNESIUM    Collection Time: 07/10/19  6:18 AM   Result Value Ref Range    Magnesium 2.1 1.6 - 2.6 mg/dL   METABOLIC PANEL, BASIC    Collection Time: 07/10/19  6:18 AM   Result Value Ref Range    Sodium 138 136 - 145 mmol/L    Potassium 4.5 3.5 - 5.5 mmol/L    Chloride 106 100 - 108 mmol/L    CO2 28 21 - 32 mmol/L    Anion gap 4 3.0 - 18 mmol/L    Glucose 100 (H) 74 - 99 mg/dL    BUN 8 7.0 - 18 MG/DL    Creatinine 0.89 0.6 - 1.3 MG/DL    BUN/Creatinine ratio 9 (L) 12 - 20      GFR est AA >60 >60 ml/min/1.73m2    GFR est non-AA >60 >60 ml/min/1.73m2    Calcium 8.1 (L) 8.5 - 10.1 MG/DL   PROTHROMBIN TIME + INR    Collection Time: 07/10/19  6:18 AM   Result Value Ref Range    Prothrombin time 27.8 (H) 11.5 - 15.2 sec    INR 2.6 (H) 0.8 - 1.2     RETICULOCYTE COUNT    Collection Time: 07/10/19  6:18 AM   Result Value Ref Range    Reticulocyte count 5.0 (H) 0.5 - 2.3 %   VITAMIN D, 25 HYDROXY    Collection Time: 07/10/19  6:18 AM   Result Value Ref Range    Vitamin D 25-Hydroxy 18.6 (L) 30 - 100 ng/mL       Estimated Glomerular Filtration Rate:  On admission, estimated GFR based on a Creatinine of 0.89 mg/dl:   Using CKD-EPI = 78.5 mL/min/1.73m2   Using MDRD = 86.6 mL/min/1.73m2      Assessment:     Primary Rehabilitation Diagnosis  1. Impaired Mobility and ADLs  2. S/P Open reduction internal fixation of displaced subtrochanteric periprosthetic fracture around internal prosthetic left hip joint (6/25/2019 - Dr. Gildardo Arredondo)  3.  History of displaced subtrochanteric periprosthetic fracture of left femur around internal prosthetic left hip joint; S/P Left total hip replacement (2/18/2019 - Dr. 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    Mild aortic stenosis I35.0    Acute blood loss as cause of postoperative anemia D62    Acute pulmonary embolism  I26.99    Anticoagulated on warfarin Z79.01    Primary osteoarthritis involving multiple joints M15.0    Gastroesophageal reflux disease K21.9    Dyslipidemia E78.5    Diverticulosis K57.90    History of deep vein thrombosis of lower extremity Z86.718    Sick sinus syndrome  I49.5    Benign prostatic hyperplasia with lower urinary tract symptoms N40.1    Tremor R25.1    Obesity, Class I, BMI 30-34.9 E66.9    Vitamin D deficiency E55.9    Essential tremor G25.0    History of pericarditis Z86.79       Willingness to participate in the program: Good      Rehabilitation Potential: Good      Plan:     1. Medical Issues being followed closely:    [x]  Fall and safety precautions     [x]  Wound Care     [x]  Bowel and Bladder Function     [x]  Fluid Electrolyte and Nutrition Balance     [x]  Pain Control      2. Issues that 24 hour rehabilitation nursing is following:    [x]  Fall and safety precautions     [x]  Wound Care     [x]  Bowel and Bladder Function     [x]  Fluid Electrolyte and Nutrition Balance     [x]  Pain Control      [x]  Assistance with and education on in-room safety with transfers to and from the bed, wheelchair, toilet and shower. 3. Acute rehabilitation plan of care:    [x]  Patient to be evaluated and treated by:           [x]  Physical Therapy           [x]  Occupational Therapy           []  Speech Therapy     []  Hold Rehab until further notice     5. Medications:    [x]  MAR Reviewed     [x]  Continue Present Medications     6.  DVT Prophylaxis:      [] Lovenox     []  Unfractionated Heparin     [x]  Coumadin     []  NOAC     []  WINDY Stockings     []  Sequential Compression Device     []  None     7. Rehabilitation program and expectations from patient, as well as medical issues discussed with the patient and his wife. REHABILITATION PLAN:    1. The patient is being admitted to a comprehensive acute inpatient rehabilitation program consisting of at least 180 minutes a day, 5 out of 7 days a week of  combined physical and occupational therapy, and close supervision by a physician with special training and experience in rehabilitation medicine. 2. The patient's prognosis for significant practical improvement within a reasonable period of time appears to be good. 3. The estimated length of stay is 13 days. 4. The patient/family has a good understanding of our discharge process. The patient has potential to make improvement and is in need of at least two of the following multidisciplinary therapies including but not limited to physical, occupational, speech, nutritional services, wound care, prosthetics and orthotics. The patient is expected to be able to return to home with home health therapy and family support. 5. Given the patient's multiple co-morbidities and risk for further medical complications, rehabilitation services could not be safely provided at a lower level of care such as a skilled nursing facility. 6. Physical therapy for therapeutic exercise, progressive mobility, gait training, transfer training, bed mobility training, patient and family education, and wheelchair mobility training. Physical therapy goals to address  extremity function, range of motion, balance, safety awareness, independence in transfers, activity tolerance, independence in bed mobility, and independence in ambulation. 7. Occupational therapy for self-care home management, transfer training, therapeutic exercise, activity, wheelchair mobility training.  Occupational therapy goals to address  extremity function, cognition, balance, activity tolerance, independence in functional transfers, range of motion, safety awareness, independence in ADL  and independence in home management skills. 8. Specialized 24 hour rehabilitation nursing care for bowel and bladder retraining, disease management, pain management, pressure ulcer prevention and management per policy, education on pressure relief techniques, embolism prevention, nutrition management, hydration management, transfer training and   medication distribution. 9. Nutrition and Dietary services will be obtained for assessment of adequate calorie needs, hydration and calorie counts as appropriate. 10. Therapeutic recreation for leisure skills. 6. Rehab psychology for coping skills. 12. Social work services for patient and family counseling and safe discharge planning. 13. I will be in charge of the inpatient rehab program. Full details of the inpatient rehab program will be outlined in the initial team conference. REHABILITATION GOALS:  Improve functional and activities of daily living skills in order to return back to independent living with family support. MEDICAL PLAN:  > S/P Open reduction internal fixation of displaced subtrochanteric periprosthetic fracture around internal prosthetic left hip joint (6/25/2019 - Dr. Lashae Hurley);  History of displaced subtrochanteric periprosthetic fracture of left femur around internal prosthetic left hip joint; S/P Left total hip replacement (2/18/2019 - Dr. Lashae Hurley)   > Nonweightbearing on the left lower extremity   > Total hip precautions on left hip   > Staples were removed on 7/9/2019    > Calcium citrate 1 tab PO BID    > Acute Postoperative Blood Loss Anemia   > Hgb/Hct (7/10/2019, on admission) = 8.8/28.0   > Anemia work-up showed serum iron 40, TIBC 237, iron % saturation 17, ferritin 568, reticulocyte count 5.0   > FeSO4 325 mg PO once daily with breakfast    > Ascorbic Acid 250 mg PO once daily with breakfast (to enhance the absorption of the FeSO4)     > Acute pulmonary embolism, anticoagulated on Warfarin   > Target INR = 2 to 3   > INR (7/10/2019) = 2.6   > Patient received no Warfarin last night (re: INR 2.9)   > Warfarin 4 mg PO tonight     > Sepsis due to Pseudomonas aeruginosa   > Blood culture (7/5/2019; 1 out of 2 sets) yielded growth of PSeudomonas aeruginosa (PANSENSITIVE)   > On 7/5/2019, patient was empirically started on Piperacillin-Tazobactam 4.5 grams IV q 6 hr and a single dose of Vancomycin IV was given   > On 7/9/2019, Piperacillin-Tazobactam IV was discontinued and patient was discharged from [facility] on oral Ciprofloxacin 500 mg PO q 12 hr x 4 days   > Ciprofloxacin 500 mg PO q 12 hr x 4 days    > Vitamin D Deficiency   > Vitamin D 25-Hydroxy (7/10/2019) = 18.6   > Cholecalciferol 50,000 units PO x 1 dose today   > Cholecalciferol 5,000 units PO once daily (starting tomorrow)     > Analgesia   > Acetaminophen 650 mg PO q 4 hr PRN for pain level less than 5/10   > Methocarbamol 500 mg PO q 8 hr    > Norco 5/325 1 tab PO q 4 hr PRN for pain level greater than 4/10     > Diet:   > Specifications: Low saturated fat   > Solids (consistency): Regular    > Liquids (consistency): Thin       PRECAUTIONS:   1. Safety/fall precautions. 2. Deep venous thrombosis precautions. 3. Nonweightbearing on the left lower extremity. 4. Total hip precautions on the left hip. POTENTIAL BARRIERS TO DISCHARGE: Risk for falls. RELEVANT CHANGES SINCE PREADMISSION SCREENING: I have compared the patients medical and functional status at the time of the pre-admission screening and on this post-admission evaluation. The preadmission screen and findings from therapy evaluations both support my post admission physician evaluation, deeming this patient to be an appropriate candidate for the IRF. The patient requires multidisciplinary treatment, physician oversight and intensive therapy not provided at a lower level of care. By signing this document, I acknowledge that I have personally performed a full physical examination on this patient within 24 hours of admission to this inpatient rehabilitation facility and have determined the patient to be able to tolerate the above course of treatment at an intensive level for a reasonable period of time. I will be completing a detailed individualized plan of care for this patient by day #4 of the patients stay based upon the Pre-Admission Screen, the Post-Admission Evaluation, and the therapy evaluations.       Signed:    Annabelle Cohn MD    July 10, 2019

## 2019-07-10 NOTE — REHAB NOTE
104 staples remove from left thigh. sterri strip applied. PT tolerated well. No drainage noted at site. Will continue to monitor.

## 2019-07-10 NOTE — PROGRESS NOTES
Problem: Mobility Impaired (Adult and Pediatric)  Goal: *Acute Goals and Plan of Care (Insert Text)  Description  Physical Therapy Goals  Short Term Goals  Initiated 7/10/2019 and to be accomplished within 7 day(s) (7/17/2019)  1. Patient will move from supine to sit and sit to supine  in bed with minimal assistance/contact guard assist.     2.  Patient will transfer from bed to chair and chair to bed with minimal assistance/contact guard assist using the least restrictive device. 3.  Patient will perform sit to stand with minimal assistance/contact guard assist.  4.  Patient will ambulate with moderate assistance  for 10 feet with the least restrictive device. Long Term Goals  Initiated 7/10/2019 and to be accomplished within 2-3 weeks (7/24/2019-7/31/2019)  1. Patient will move from supine to sit and sit to supine , scoot up and down and roll side to side in bed with modified independence. 2.  Patient will transfer from bed to chair and chair to bed with supervision/set-up using the least restrictive device. 3.  Patient will perform sit to stand with supervision/set-up. 4.  Patient will ambulate with supervision/set-up for 50 feet with RW maintaining NWB left LE. 5.  Patient will propel w/c over level surfaces greater than 150 feet and over entryways, thresholds, and ramps with modified independence. Outcome: Progressing Towards Goal   PHYSICAL THERAPY EVALUATION    Patient: Renetta Hernandez (80 y.o. male)  Date: 7/10/2019  Diagnosis: Status post open reduction with internal fixation of fracture [Z96.7, Z87.81] Status post open reduction with internal fixation of fracture  Precautions:  Fall Risk Precautions, NWB Left LE, THP left LE   Chart, physical therapy assessment, plan of care and goals were reviewed. Time In: 0930  Time Out: 1100  Patient Seen For: Balance activities; Patient education;Transfer training; Wheelchair mobility  Pain:  Pt pain was reported as 6-7/10 left LE pre-treatment which pt describes as \"tolerable. \"  Pt pain was reported as 6-7/10 post-treatment. Intervention: N/A - pt does not request pain medication and notes his pain is \"tolerable. \"    Patient identified with name and : yes    SUBJECTIVE:     Patient stated ? Now I feel my age,? during evaluation. Pt is pleasant and cooperative at times requiring redirection to task as pt can be tangential with conversation. Pt requires re-education re: total hip precautions but is able to verbalize awareness of NWB precaution at start of evaluation. Pt appears receptive to education provided and engaged in [id number]. Patient's Goal for Physical Therapy: \"I want to travel around with the walker. \"    OBJECTIVE DATA SUMMARY:     Past Medical History:   Diagnosis Date    Acute blood loss as cause of postoperative anemia 2019    Acute pulmonary embolism ([address] 2019    Pulmonary emboli at the periphery of the right main pulmonary artery extending into the middle and lower lobe branches    Anticoagulated on warfarin 2019    Benign prostatic hyperplasia with lower urinary tract symptoms     Demand ischemia of myocardium ([address] 2019 NST EF 59% , small, reversible apical defect 19 ECHO EF 68%, LVH 14/16, LAE, RVE, ANDREY 1.4cm2 with 12mm mean gradient    Diverticulosis     Dyslipidemia     Essential hypertension     Gastroesophageal reflux disease     History of deep vein thrombosis of lower extremity     70's-80's    History of hypertension     Obesity, Class I, BMI 30-34.9     Paroxysmal atrial fibrillation ([address]     CVAL Maddy Saleh FNP   AF ablation 13 EPS with isolation of RSPV and LIPV 18 TEDDY  EF 55%, 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 male recently admitted for femur fracturesent from skilled nursing facility for fever to 102.9. Patient was recently seen at [facility] for hip replacement status post fall. Patient endorses retrosternal chest pain that started upon waking this morning has been persistent since without propagating or palliating factors. Patient also endorses increased bilateral lower extremity s    Sick sinus syndrome (HCC)     Tremor     Vitamin D deficiency 7/10/2019    Vitamin D 25-Hydroxy (7/10/2019) = 18.6      Past Surgical History:   Procedure Laterality Date    HX HIP FRACTURE TX Left 06/25/2019    S/P Open reduction internal fixation of displaced subtrochanteric periprosthetic fracture around internal prosthetic left hip joint (6/25/2019 - Dr. Holly Valentin)    [facility] 02/18/2019    S/P Left total hip replacement (2/18/2019 - Dr. Holly Valentin)    [address], EACH ADD 27 MIN      brain surgery 2015       Therapy Diagnosis:   Difficulty with bed mobility  ? Difficulty with functional transfers  ? Difficulty with ambulation  ? Difficulty with stair negotiations  ? Problem List:    Decreased strength B LE  ? Decreased strength trunk/core  ? Decreased AROM   ? Decreased PROM  ? Decreased endurance  ? Decreased balance sitting  ? Decreased balance standing  ? Pain   ? Slow ambulation velocity  ? Decreased coordination  ? Decreased safety awareness  ? Functional Limitations:   Decreased independence with bed mobility  ? Decreased independence with functional transfers  ? Decreased independence with ambulation  ? Decreased independence with stair negotiation  ? Previous Functional Level: Pt reports prior to total hip replacement he was independent with mobility and had been modified independent following his total hip replacement this year.      Home Environment: Home Environment: Private residence  # Steps to Enter: 3  Rails to Enter: No  Wheelchair Ramp: No  One/Two Story Residence: One story  Living Alone: No  Support Systems: Spouse/Significant Other/Partner  Patient Expects to be Discharged to[de-identified] Private residence  Current DME Used/Available at Home: Erskin Jer or Shower Type: Shower    Barriers to Learning/Limitations: yes;  physical  Compensate with: visual, verbal, tactile, kinesthetic cues/model         Outcome Measures:  FIM/MMT     MMT Initial Assessment   Right Lower Extremity Left Lower Extremity   Hip Flexion 4 2-(tested within THP)   Knee Extension 4 2-   Knee Flexion 4+ 2-   Ankle Dorsiflexion 4 3(NT - not tolerating resistance and also NWB left LE)   0/5 No palpable muscle contraction  1/5 Palpable muscle contraction, no joint movement  2-/5 Less than full range of motion in gravity eliminated position  2/5 Able to complete full range of motion in gravity eliminated position  2+/5 Able to initiate movement against gravity  3-/5 More than half but not full range of motion against gravity  3/5 Able to complete full range of motion against gravity  3+/5 Completes full range of motion against gravity with minimal resistance  4-/5 Completes full range of motion against gravity with minimal-moderate resistance  4/5 Completes full range of motion against gravity with moderate resistance  4+/5 Completes full range of motion against gravity with moderate-maximum resistance  5/5 Completes full range of motion against gravity with maximum resistance        GROSS ASSESSMENT Initial Assessment 7/10/2019   AROM Grossly decreased, non-functional   Strength Grossly decreased, non-functional   Coordination Within functional limits   Tone Normal   Sensation Intact   PROM Generally decreased, functional     POSTURE Initial Assessment 7/10/2019   Posture (WDL) Exceptions to WDL   Posture Assessment Forward head;Kyphosis;Trunk flexion;Rounded shoulders       BALANCE Initial Assessment    Sitting - Static: Good (unsupported)  Sitting - Dynamic: Good (unsupported)  Standing - Static: Poor  Standing - Dynamic : Impaired       FIM SCORES Initial Assessment   Bed/Chair/Wheelchair Transfers Other: stand step with RW  Transfer Assistance : 3 (Moderate assistance )  Sit to Stand Assistance: Moderate assistance  Car Transfers: Not tested  Car Type: N/A   Wheelchair Mobility Able to Propel (ft): 150 feet  Functional Level: 4  Curbs/Ramps Assist Required (FIM Score): 0 (Not tested)  Wheelchair Setup Assist Required : 3 (Moderate assistance)  Wheelchair Management: Manages left brake, Manages right brake   Walking [place] [address] (Dependent/total assistance)   Steps/Stairs Steps/Stairs Ambulated (#): 0  Level of Assist : 0 (Not tested)  Rail Use: None   PRIMARY MODE OF LOCOMOTION: ambulation  Please see [facility] Interdisciplinary Eval: Coordination/Balance Section for details regarding FIM score description. BED/CHAIR/WHEELCHAIR TRANSFERS Initial Assessment   Rolling Right 4 (Minimal assistance) to maintain total hip precautions with pillow between B LEs. Rolling Left 4 (Minimal assistance) to maintain total hip precautions with pillow between B LEs. Supine to Sit 3 (Moderate assistance) for B LE management and to maintain total hip precautions with pillow between B LEs. Sit to Stand Moderate assistance for ant and up weight shift with sit to stand and for slow controlled descent with stand to sit. Pt wore ped-alert shoe for audible feedback and demonstrated inconsistence maintenance of left LE NWB. Sit to Supine 3 (Moderate assistance) for B LE management and to maintain total hip precautions with pillow between B LEs. Transfer Assist Score Other: stand step with RW  Transfer Assistance : 3 (Moderate assistance )  Sit to Stand Assistance: Moderate assistance  Car Transfers: Not tested  Car Type: N/A   Transfer Type Stand Step with RW   Comments Attempted stand step transfer w/c <> mat table requiring mod assist for sit <> stand and min to mod assist for balance with transition.   Attempted to assist pt to un-weight right LE to step while maintaining left LE NWB utilizing B UE support. However, pt was unable to maintain left LE NWB with multiple audible alerts from ped-alert with pt pivoting on right LE to manage transition from w/c to mat and mat to w/c. Car Transfer Not tested   Car Type N/A       WHEELCHAIR MOBILITY/MANAGEMENT Initial Assessment   Able to Propel 150 feet with B UEs with minimal assistance for managing narrow spaces and entryways. Functional Level 4   Curbs/ramps assistance required 0 (Not tested)   Wheelchair set up assistance required 3 (Moderate assistance)   Wheelchair management Manages left brake, Manages right brake       WALKING INDEPENDENCE Initial Assessment   Assistive device Walker, rolling   Ambulation assistance - level surface 1 (Dependent/total assistance)   Distance 0 Feet (ft)   Functional Level 0(Unable to safely maintain NWB with stand step transfers)   Comments Not safe to assess 2/2 patient unable to maintain left LE NWB consistently with functional transfers. Ambulation assistance - unlevel surface NT       STEPS/STAIRS Initial Assessment   Steps/Stairs ambulated Steps/Stairs Ambulated (#): 0  Level of Assist : 0 (Not tested)  Rail Use: None   Rail Use None   Functional Level 0(Unable to safely maintain NWB with stand step transfers)   Comments  Not safe to assess   Curbs/Ramps  Not safe to assess       ASSESSMENT :  Based on the objective data described above, the patient presents with impaired functional strength, balance and ROM limiting independence with functional mobility. Patient will benefit from skilled intervention to address the above impairments. Patient?s rehabilitation potential is considered to be Good  Factors which may influence rehabilitation potential include:   ? None noted  ? Mental ability/status  ? Medical condition  ? Home/family situation and support systems  ? Safety awareness  ? Pain tolerance/management  ? Other:      PLAN :  Please refer to POC for details. Order received from MD for physical therapy services and chart reviewed. Pt to be seen 5 times per week for 3 hours of total therapy per day for 2-3 weeks. Thank you for the referral.    Pt would benefit from skilled physical therapy in order to improve independent functional mobility within the home. Interventions may include range of motion (AROM, PROM B LE/trunk), motor function (B LE/trunk strengthening/coordination), activity tolerance (vitals, oxygen saturation levels), bed mobility training, balance activities, gait training (progressive ambulation program), wheelchair management and functional transfer training. Discharge Recommendations: Home Health Physical Therapy with 24 hour assistance  Further Equipment Recommendations for Discharge: rolling walker (pt reports owning 13year old RW \"in good shape,\") and wheelchair 18 inch with ramp for safe home entry. Activity Tolerance:   Fair + with pt requiring rest breaks throughout. Please refer to the flowsheet for vital signs taken during this treatment. After treatment:   Patient left seated in w/c in no apparent distress with call bell in reach, pt educated re: use of call bell and awaiting meeting with unit psychologist.    Clayton Crew:   ?         Fall prevention education was provided and the patient/caregiver indicated understanding. ? Patient/family have participated as able in goal setting and plan of care. ?         Patient/family agree to work toward stated goals and plan of care. ?         Patient understands intent and goals of therapy, but is neutral about his/her participation. ? Patient is unable to participate in goal setting and plan of care.     Thank you for this referral.  Deny Matthews PT, DPT  7/10/2019

## 2019-07-10 NOTE — PROGRESS NOTES
Problem: Self Care Deficits Care Plan (Adult)  Goal: Interventions  Outcome: Progressing Towards Goal     Problem: Self Care Deficits Care Plan (Adult)  Goal: *Therapy Goal (Edit Goal, Insert Text)  Description  Occupational Therapy Goals   Long Term Goals  Initiated 7/10/19 and to be accomplished within 2 week(s) 19  1. Pt will perform self-feeding with I.  2. Pt will perform grooming with S.  3. Pt will perform UB bathing with S.  4. Pt will perform LB bathing with S.  5. Pt will perform tub/shower transfer with S.   6. Pt will perform UB dressing with S.  7. Pt will perform LB dressing with S.  8. Pt will perform toileting task with Clevester Meeter. 9. Pt will perform toilet transfer with S.    Short Term Goals   Initiated 7/10/19 and to be accomplished within 7 day(s)  19  1. Pt will perform self-feeding with S.   2. Pt will perform grooming with S.  3. Pt will perform UB bathing with S.  4. Pt will perform LB bathing with [place]. 5. Pt will perform tub/shower transfer with Clevester Meeter. 6. Pt will perform UB dressing with S.  7. Pt will perform LB dressing with Chandler. 8. Pt will perform toileting task with [place]. 9. Pt will perform toilet transfer with Clevester Meeter. OCCUPATIONAL THERAPY EVALUATION    Patient: Avtar Villagomez. 80 y.o.   Date: 7/10/2019  Primary Diagnosis: Status post open reduction with internal fixation of fracture [Z96.7, Z87.81]    Patient identified with name and : Yes    Past Medical History:   Past Medical History:   Diagnosis Date    Acute blood loss as cause of postoperative anemia 2019    Acute pulmonary embolism ([address] 2019    Pulmonary emboli at the periphery of the right main pulmonary artery extending into the middle and lower lobe branches    Anticoagulated on warfarin 2019    Benign prostatic hyperplasia with lower urinary tract symptoms     Demand ischemia of myocardium ([address] 2019 NST EF 59% , small, reversible apical defect 19 ECHO EF 68%, LVH 14/16, LAE, RVE, ANDREY 1.4cm2 with 12mm mean gradient    Diverticulosis     Dyslipidemia     Essential hypertension     Gastroesophageal reflux disease     History of deep vein thrombosis of lower extremity     70's-80's    History of hypertension     Obesity, Class I, BMI 30-34.9     Paroxysmal atrial fibrillation ([address]     CVAL Maddy Saleh FNP 2008  AF ablation 9/11/13 EPS with isolation of RSPV and LIPV 6/4/18 TEDDY  EF 55%, 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 male recently admitted for femur fracturesent from skilled nursing facility for fever to 102.9. Patient was recently seen at [facility] for hip replacement status post fall. Patient endorses retrosternal chest pain that started upon waking this morning has been persistent since without propagating or palliating factors. Patient also endorses increased bilateral lower extremity s    Sick sinus syndrome (HCC)     Tremor     Vitamin D deficiency 7/10/2019    Vitamin D 25-Hydroxy (7/10/2019) = 18.6      Precautions: Fall; NWB  on LLE    Time In: 0730  Time Out[de-identified] 0900    Pain:  Pt reports 6/10 pain or discomfort prior to treatment. Pt reports 6/10 pain or discomfort post treatment. SUBJECTIVE:   Patient stated \"I have had knee and shoulder surgery\"    OBJECTIVE DATA SUMMARY:   Pt seen in AM for Occupational Therapy. Pt received lying supine in bed alert and Ox4 with wife present. OT introduction performed with purpose discussed. Pt agreeable to OT Evaluation. ? Right hand dominant   ? Left hand dominant    Therapy Diagnosis:   Difficulty with ADLs  ? Difficulty with functional transfers  ? Difficulty with ambulation  ? Difficulty with IADLs  ? Problem List:    Decreased strength B UE  ? Decreased strength trunk/core  ? Decreased AROM   ? Decreased endurance  ? Decreased balance sitting  ? Decreased balance standing  ? Pain   ? Decreased PROM  ? Functional Limitations:   Decreased independence with ADL  ? Decreased independence with functional transfers  ? Decreased independence with ambulation  ? Decreased independence with IADL  ? Previous Functional Level:  Agata in all ADLs/IADLs.     Home Environment: Home Situation  Home Environment: Private residence  # Steps to Enter: 3  Rails to Enter: No  Wheelchair Ramp: No  One/Two Story Residence: One story  Living Alone: No  Support Systems: Spouse/Significant Other/Partner  Patient Expects to be Discharged to[de-identified] Private residence  Current DME Used/Available at Home: Evita Jason or Shower Type: Shower    Barriers to Learning/Limitations: None  Compensate with: visual, verbal, tactile, kinesthetic cues/model    Outcome Measures:      MMT Initial Assessment   Right Upper Extremity  Left Upper Extremity    UE AROM WFL; MMT 4/5 grossly WFL; MMT 4/5 grossly   Shoulder flexion     Shoulder extension     Shoulder ABDuction     Shoulder ADDUction     Elbow Flexion     Elbow Extension     Wrist Extension/Flexion          0/5 No palpable muscle contraction  1/5 Palpable muscle contraction, no joint movement  2-/5 Less than full range of motion in gravity eliminated position  2/5 Able to complete full range of motion in gravity eliminated position  2+/5 Able to initiate movement against gravity  3-/5 More than half but not full range of motion against gravity  3/5 Able to complete full range of motion against gravity  3+/5 Completes full range of motion against gravity with minimal resistance  4-/5 Completes full range of motion against gravity with minimal resistance  4/5 Completes full range of motion against gravity with moderate resistance  5/5 Completes full range of motion against gravity with maximum resistance    Sensation: Intact  Coordination: Impaired    FIM SCORES Initial Assessment   Bladder - level of assist     Bladder - accident frequency score     Bowel - level of assist     Bowel - accident frequency score     Please see IRC Interdisciplinary Eval: Coordination/Balance Section for details regarding FIM score description.     COGNITION/PERCEPTION Initial Assessment   Reading Status  Literate   Writing Status  WFL   Arousal/Alertness  Alert   Orientation Level Oriented X4   Visual Fields     Praxis     Body Scheme       COMPREHENSION MODE Initial Assessment   Primary Mode of Comprehension Auditory   Hearing Aide None   Corrective Lenses     Score 5     EXPRESSION Initial Assessment   Primary Mode of Expression Verbal   Score 5   Comments       SOCIAL INTERACTION/PRAGMATICS Initial Assessment   Score 5   Comments       PROBLEM SOLVING Initial Assessment   Score 5   Comments       MEMORY Initial Assessment   Score 5   Comments       EATING Initial Assessment   Functional Level 5   Comments       GROOMING Initial Assessment   Functional Level 5    Oral Hygiene FIM:5   Tasks completed by patient     Comments       BATHING Initial Assessment   Functional Level 3(supine/ HOB raised)   Body parts patient bathed Abdomen, Arm, left, Arm, right, Buttocks, Chest, Kerrie area, Thigh, left, Thigh, right   Comments assist with lower leg and feet     TUB/SHOWER TRANSFER INDEPENDENCE Initial Assessment   Score 0   Comments       UPPER BODY DRESSING/UNDRESSING Initial Assessment   Functional Level 5   Items applied/Steps completed Pullover (4 steps)   Comments seated e       LOWER BODY DRESSING/UNDRESSING Initial Assessment   Functional Level 3    Sock and/or Shoe Management FIM:1   Items applied/Steps completed Elastic waist pants (3 steps)   Comments thread BLE through pants and assist to pull up to thigh     TOILETING Initial Assessment   Functional Level 1   Comments       TOILET TRANSFER INDEPENDENCE Initial Assessment   Transfer score 3(using FWW)   Comments       INSTRUMENTAL ADL Initial Assessment (PLOF)   Meal preparation Modified independent   Homemaking Modified independent   Medicine Management Modified independent   Financial Management Modified independent        ASSESSMENT :  Based on the objective data described below, the patient presents with decreased LE mobility and activity tolerance  affecting I in functional transfers and ADLs/IADLs. Pt would benefit from skilled occupational therapy in order to improve independent functional mobility/ADLs,/IADLs within the home. Interventions may include range of motion (AROM, PROM B UE), motor function (B UE/ strengthening/coordination), activity tolerance (vitals, oxygen saturation levels), balance training, ADL/IADL training and functional transfer training. Please see IRC; Interdisciplinary Eval, Care Plan, and Patient Education for further information regarding occupational therapy examination and plan of care. PLAN :  Recommendations and Planned Interventions:  ?               Self Care Training                   ? Therapeutic Activities  ? Functional Mobility Training    ? Cognitive Retraining  ? Therapeutic Exercises            ? Endurance Activities  ? Balance Training                     ? Neuromuscular Re-Education  ? Visual/Perceptual Training      ? Home Safety Training  ? Patient Education                    ? Family Training/Education  ? Other (comment):    Frequency/Duration: Patient will be followed by occupational therapy 1-2 times per day/4-7 days per week to address goals. Discharge Recommendations: Home Health  Further Equipment Recommendations for Discharge: 3 in 1 bedside commode, shower chair     Please refer to the flowsheet for vital signs taken during this treatment. After treatment:   ? Patient left in no apparent distress sitting up in chair  ?  Patient left in no apparent distress in bed  ? Call bell left within reach  ? Nursing notified  ? Caregiver present  ? Bed alarm activated    COMMUNICATION/EDUCATION:   ? Home safety education was provided and the patient/caregiver indicated understanding. ? Patient/family have participated as able in goal setting and plan of care. ? Patient/family agree to work toward stated goals and plan of care. ? Patient understands intent and goals of therapy, but is neutral about his/her participation. ? Patient is unable to participate in goal setting and plan of care. Order received from MD for occupational therapy services and chart reviewed. Pt to be seen 5 times per week for 3 hours of total therapy per day for 2 weeks.   Thank you for the referral.    Thank you for this referral.  Kristen Ayala OT       Outcome: Progressing Towards Goal

## 2019-07-11 LAB
HCT VFR BLD AUTO: 30.4 % (ref 36–48)
HGB BLD-MCNC: 9.8 G/DL (ref 13–16)
INR PPP: 2.3 (ref 0.8–1.2)
PROTHROMBIN TIME: 24.9 SEC (ref 11.5–15.2)

## 2019-07-11 PROCEDURE — 97530 THERAPEUTIC ACTIVITIES: CPT

## 2019-07-11 PROCEDURE — 97542 WHEELCHAIR MNGMENT TRAINING: CPT

## 2019-07-11 PROCEDURE — 65310000000 HC RM PRIVATE REHAB

## 2019-07-11 PROCEDURE — 85018 HEMOGLOBIN: CPT

## 2019-07-11 PROCEDURE — 36415 COLL VENOUS BLD VENIPUNCTURE: CPT

## 2019-07-11 PROCEDURE — 97535 SELF CARE MNGMENT TRAINING: CPT

## 2019-07-11 PROCEDURE — 97110 THERAPEUTIC EXERCISES: CPT

## 2019-07-11 PROCEDURE — 85610 PROTHROMBIN TIME: CPT

## 2019-07-11 PROCEDURE — 74011250637 HC RX REV CODE- 250/637: Performed by: INTERNAL MEDICINE

## 2019-07-11 RX ORDER — WARFARIN 4 MG/1
4 TABLET ORAL ONCE
Status: COMPLETED | OUTPATIENT
Start: 2019-07-11 | End: 2019-07-11

## 2019-07-11 RX ADMIN — FERROUS SULFATE TAB 325 MG (65 MG ELEMENTAL FE) 325 MG: 325 (65 FE) TAB at 09:28

## 2019-07-11 RX ADMIN — CIPROFLOXACIN HYDROCHLORIDE 500 MG: 500 TABLET, FILM COATED ORAL at 09:29

## 2019-07-11 RX ADMIN — HYDROCODONE BITARTRATE AND ACETAMINOPHEN 1 TABLET: 5; 325 TABLET ORAL at 18:48

## 2019-07-11 RX ADMIN — CALCIUM CITRATE 200 MG (950 MG) TABLET 200 MG: at 18:03

## 2019-07-11 RX ADMIN — CIPROFLOXACIN HYDROCHLORIDE 500 MG: 500 TABLET, FILM COATED ORAL at 21:00

## 2019-07-11 RX ADMIN — ACETAMINOPHEN 650 MG: 325 TABLET ORAL at 20:59

## 2019-07-11 RX ADMIN — Medication 5000 UNITS: at 09:28

## 2019-07-11 RX ADMIN — METHOCARBAMOL TABLETS 500 MG: 500 TABLET, COATED ORAL at 21:00

## 2019-07-11 RX ADMIN — HYDROCODONE BITARTRATE AND ACETAMINOPHEN 1 TABLET: 5; 325 TABLET ORAL at 12:01

## 2019-07-11 RX ADMIN — Medication 250 MG: at 09:28

## 2019-07-11 RX ADMIN — HYDROCODONE BITARTRATE AND ACETAMINOPHEN 1 TABLET: 5; 325 TABLET ORAL at 08:01

## 2019-07-11 RX ADMIN — METHOCARBAMOL TABLETS 500 MG: 500 TABLET, COATED ORAL at 06:56

## 2019-07-11 RX ADMIN — SIMVASTATIN 10 MG: 10 TABLET, FILM COATED ORAL at 21:00

## 2019-07-11 RX ADMIN — WARFARIN SODIUM 4 MG: 4 TABLET ORAL at 18:03

## 2019-07-11 RX ADMIN — CALCIUM CITRATE 200 MG (950 MG) TABLET 200 MG: at 09:28

## 2019-07-11 RX ADMIN — METHOCARBAMOL TABLETS 500 MG: 500 TABLET, COATED ORAL at 13:32

## 2019-07-11 NOTE — PROGRESS NOTES
SHIFT CHANGE NOTE FOR [facility]    Bedside and Verbal shift change report given to Thor Crystal RN (oncoming nurse) by Felisa Holman RN (offgoing nurse). Report included the following information SBAR, Kardex, MAR and Recent Results. Situation:   Code Status: Full Code   Reason for Admission: S/P ORIF  Hospital Day: 2   Problem List:   Hospital Problems  Date Reviewed: 4/25/2019          Codes Class Noted POA    Vitamin D deficiency (Chronic) ICD-10-CM: E55.9  ICD-9-CM: 268.9  7/10/2019 Yes    Overview Signed 7/10/2019 11:23 AM by Charissa Emmanuel MD     Vitamin D 25-Hydroxy (7/10/2019) = 18.6              Sepsis due to Pseudomonas species [facility]) ICD-10-CM: A41.52  ICD-9-CM: 038.43, 995.91  7/5/2019 Yes    Overview Addendum 7/6/2019  8:16 PM by Nas Mendez MD     7/5/19      80 y.o. male recently admitted for femur fracturesent from skilled nursing [facility] for fever to 102.9. Patient was recently seen at [facility] for hip replacement status post fall. Patient endorses retrosternal chest pain that started upon waking this morning has been persistent since without propagating or palliating factors. Patient also endorses increased bilateral lower extremity swelling and difficulty breathing. WBC 29.2  P-87%,  U/A neg  CT  Heterogeneous collection posterior to the greater trochanter, most consistent with a postoperative hematoma. No rim enhancement to suggest abscess  CXR Small bilateral pleural effusions, left greater than right. Mild pulmonary edema.  No consolidation  7/6/19 ORTHO ([facility]) Benign appearing left hip and femur now 11 days s/p ORIF periprosthetic hip fracture                         Impaired mobility and ADLs ICD-10-CM: Z74.09  ICD-9-CM: 799.89  7/5/2019 Yes        Acute pulmonary embolism ([facility] ICD-10-CM: I26.99  ICD-9-CM: 415.19  7/1/2019 Yes    Overview Signed 7/9/2019  5:07 PM by Charissa Emmanuel MD     Pulmonary emboli at the periphery of the right main pulmonary artery extending into the middle and lower lobe branches             Anticoagulated on warfarin (Chronic) ICD-10-CM: Z79.01  ICD-9-CM: V58.61  7/1/2019 Yes        * (Principal) Status post open reduction with internal fixation of fracture ICD-10-CM: Z96.7, Z87.81  ICD-9-CM: V45.89, V15.51  6/25/2019 Yes    Overview Signed 7/9/2019  4:52 PM by Ronald Daniels MD     S/P Open reduction internal fixation of displaced subtrochanteric periprosthetic fracture around internal prosthetic left hip joint (6/25/2019 - Dr. Rickie Miller)             Acute blood loss as cause of postoperative anemia ICD-10-CM: D62  ICD-9-CM: 285.1  6/25/2019 Yes        Periprosthetic fracture around internal prosthetic left hip joint ([facility] ICD-10-CM: M97. 02XA  ICD-9-CM: 996.44, V43.64  6/24/2019 Yes    Overview Signed 7/9/2019  5:04 PM by Ronald Daniels MD     S/P Open reduction internal fixation of displaced subtrochanteric periprosthetic fracture around internal prosthetic left hip joint (6/25/2019 - Dr. Rickie Miller)             Displaced subtrochanteric fracture of left femur [facility]) ICD-10-CM: W41.38YA  ICD-9-CM: 820.22  6/24/2019 Yes    Overview Signed 7/9/2019  5:02 PM by Ronald Daniels MD     S/P Open reduction internal fixation of displaced subtrochanteric periprosthetic fracture around internal prosthetic left hip joint (6/25/2019 - Dr. Rickie Miller)             History of total left hip arthroplasty ICD-10-CM: L65.747  ICD-9-CM: V43.64  2/18/2019 Yes    Overview Addendum 7/9/2019  5:01 PM by Ronald Daniels MD     S/P Left total hip replacement (2/18/2019 - Dr. Rickie Miller)                   Background:   Past Medical History:   Past Medical History:   Diagnosis Date    Acute blood loss as cause of postoperative anemia 6/25/2019    Acute pulmonary embolism ([facility] 7/1/2019    Pulmonary emboli at the periphery of the right main pulmonary artery extending into the middle and lower lobe branches    Anticoagulated on warfarin 7/1/2019    Benign prostatic hyperplasia with lower urinary tract symptoms     Demand ischemia of myocardium ([address] 7/6/2019 4/16/18 NST EF 59% , small, reversible apical defect 7/2/19 ECHO EF 68%, LVH 14/16, LAE, RVE, ANDREY 1.4cm2 with 12mm mean gradient    Diverticulosis     Dyslipidemia     Essential tremor     Gastroesophageal reflux disease     History of pericarditis     Obesity, Class I, BMI 30-34.9     Paroxysmal atrial fibrillation ([address]     CVAL Maddy Saleh FNP 2008  AF ablation 9/11/13 EPS with isolation of RSPV and LIPV 6/4/18 TEDDY  EF 55%, 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 male recently admitted for femur fracturesent from [facility] nursing [facility] for fever to 102.9. Patient was recently seen at [facility] for hip replacement status post fall. Patient endorses retrosternal chest pain that started upon waking this morning has been persistent since without propagating or palliating factors.   Patient also endorses increased bilateral lower extremity s    Sick sinus syndrome ([address]     Vitamin D deficiency 7/10/2019    Vitamin D 25-Hydroxy (7/10/2019) = 18.6       Patient taking anticoagulants yes Coumadin   Patient has a defibrillator: no     Assessment:   Changes in Assessment throughout shift: no     Patient has central line: no Reasons if yes: na  Insertion date:na Last dressing date:na   Patient has Truong Cath: no Reasons if yes: n/a   Insertion date:n/a     Last Vitals:     Vitals:    07/10/19 0750 07/10/19 1615 07/10/19 1633 07/10/19 2129   BP: 140/76 131/76  133/72   Pulse: 62 67  68   Resp: 19 19 18   Temp: 97.9 °F (36.6 °C) 98.8 °F (37.1 °C)  98.5 °F (36.9 °C)   SpO2: 95% 98%  96%   Weight:   103.4 kg (228 lb)    Height:   6' 2\" (1.88 m)         PAIN    Pain Assessment    Pain Intensity 1: 0 (07/11/19 0408) Pain Intensity 1: 2 (12/29/14 1105)    Pain Location 1: Leg Pain Location 1: Abdomen    Pain Intervention(s) 1: Medication (see MAR) Pain Intervention(s) 1: Medication (see MAR)  Patient Stated Pain Goal: 0 Patient Stated Pain Goal: 0  o Intervention effective: yes    o Other actions taken for pain: no     Skin Assessment  Skin color Skin Color: Appropriate for ethnicity  Condition/Temperature Skin Condition/Temp: Dry, Warm  Integrity Skin Integrity: Incision (comment)  Turgor Turgor: Non-tenting  Weekly Pressure Ulcer Documentation  Pressure  Injury Documentation: No Pressure Injury Noted-Pressure Ulcer Prevention Initiated  Wound Prevention & Protection Methods  Orientation of wound Orientation of Wound Prevention: Posterior  Location of Prevention Location of Wound Prevention: Sacrum/Coccyx  Dressing Present Dressing Present : No  Dressing Status    Wound Offloading Wound Offloading (Prevention Methods): Bed, pressure redistribution/air, Repositioning     INTAKE/OUPUT  Date 07/10/19 0700 - 07/11/19 0659 07/11/19 0700 - 07/12/19 0659   Shift 8052-6811 [id number] 24 Hour Total 6382-3936 5051-7447 24 Hour Total   INTAKE   Shift Total(mL/kg)         OUTPUT   Urine(mL/kg/hr)           Urine Occurrence(s)  1 x 1 x 1 x  1 x   Stool           Stool Occurrence(s)  0 x 0 x 0 x  0 x   Shift Total(mL/kg)         NET         Weight (kg) 103.4 103.4 103.4 103.4 103.4 103.4       Recommendations:  1. Patient needs and requests: addressed    2. Diet: cardiac    3. Pending tests/procedures: none     4. Functional Level/Equipment: whellchair    5. Estimated Discharge Date: TBD Posted on Whiteboard in Patients Room: no     HEALS Safety Check    A safety check occurred in the patient's room between off going nurse and oncoming nurse listed above.     The safety check included the below items  Area Items   H  High Alert Medications - Verify all high alert medication drips (heparin, PCA, etc.)   E  Equipment - Suction is set up for ALL patients (with presley)  - Red plugs utilized for all equipment (IV pumps, etc.)  - WOWs wiped down at end of shift.  - Room stocked with oxygen, suction, and other unit-specific supplies   A  Alarms - Bed alarm is set for fall risk patients  - Ensure chair alarm is in place and activated if patient is up in a chair   L  Lines - Check IV for any infiltration  - Truong bag is empty if patient has a Truong   - Tubing and IV bags are labeled   S  Safety   - Room is clean, patient is clean, and equipment is clean. - Hallways are clear from equipment besides carts. - Fall bracelet on for fall risk patients  - Ensure room is clear and free of clutter  - Suction is set up for ALL patients (with presley)  - Hallways are clear from equipment besides carts.    - Isolation precautions followed, supplies available outside room, sign posted

## 2019-07-11 NOTE — INTERDISCIPLINARY ROUNDS
[address] 
[address] [place] [address], [address] [facility] SUMMARY Date of Conference: 7/11/2019 Patient Information:  
 
  
Name: Taz Schmidt. Age / Sex: 80 y.o. / male CSN: [id number] MRN: [id number] Admit Date: 7/9/2019 Length of Stay: 2 days Primary Rehabilitation Diagnosis 1. Impaired Mobility and ADLs 2. S/P Open reduction internal fixation of displaced subtrochanteric periprosthetic fracture around internal prosthetic left hip joint (6/25/2019 - Dr. Kim Smiley) 3. History of displaced subtrochanteric periprosthetic fracture of left femur around internal prosthetic left hip joint; S/P Left total hip replacement (2/18/2019 - Dr. Kim Smiley) 
  
Comorbidities  Groin pain, right R10.31  Sepsis due to Pseudomonas species A41.52  
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  
 Mild aortic stenosis I35.0  Acute blood loss as cause of postoperative anemia D62  Acute pulmonary embolism  I26.99  
 Anticoagulated on warfarin Z79.01  
 Primary osteoarthritis involving multiple joints M15.0  Gastroesophageal reflux disease K21.9  Dyslipidemia E78.5  Diverticulosis K57.90  
 History of deep vein thrombosis of lower extremity Z86.718  Sick sinus syndrome  I49.5  Benign prostatic hyperplasia with lower urinary tract symptoms N40.1  Tremor R25.1  Obesity, Class I, BMI 30-34.9 E66.9  Vitamin D deficiency E55.9  Essential tremor G25.0  History of pericarditis Z86.79  
  
 
 
Therapy: FIM SCORES Initial Assessment Weekly Progress Assessment 7/11/2019 Eating Functional Level: 5  5 Swallowing Grooming 5  5 Bathing 3(supine/ HOB raised)  3 Bathing Assistance, Lower : 5 (Supervision) Position Performed: Seated in chair Adaptive Equipment: Long handled sponge Comments: Simulated for education on use of LH sponge Upper Body Dressing Functional Level: 5 Items Applied/Steps Completed: Pullover (4 steps) Comments: seated e  5 Lower Body Dressing Functional Level: 3 Items Applied/Steps Completed: Elastic waist pants (3 steps) Comments: thread BLE and assit to pull up to thigh Dressing Assistance : 3 (Moderate assistance) Leg Crossed Method Used: No 
Position Performed: Seated in chair Adaptive Equipment Used: Long handled shoe horn;Reacher;Sock aid Comments: Pt doffed socks with reacher. Education for use of sock aid with pt able to return demo to esther B socks with Supervision given extra time. Pt demo use of reacher to esther shorts over B feet with cues for technique given Mod A as pt attempted to put B feet in same shorts leg. Supervision to doff using reacher. Mod A to esther B shoes with pt using reacher/LH shoe horn as needed; Dep to fasten shoes. 3 Toileting Functional Level: 1  1 Bladder  level of assist 3 Bladder  accident frequency score 3 Bowel  level of assist 3 Bowel  accident frequency score 3 Toilet Transfer Haines Toilet Transfer Score: 3(using FWW)  3 Tub/Shower Transfer Haines Tub or Shower Type: Shower Tub/Shower Transfer Score: 0   
 0 Comprehension Primary Mode of Comprehension: Auditory Score: 5 Expression Primary Mode of Expression: Verbal 
Score: 5 Social Interaction Score: 5 Problem Solving Score: 5 Memory Score: 5 FIM SCORES Initial Assessment Weekly Progress Assessment 7/11/2019 Bed/Chair/Wheelchair Transfers Other: stand step with RW Transfer Assistance : 3 (Moderate assistance ) Sit to Stand Assistance: Moderate assistance Car Transfers: Not tested Car Type: N/A Other: stand step with RW Transfer Assistance : 3 (Moderate assistance ) Sit to Stand Assistance: Minimal assistance Bed Mobility Rolling Right 4 (Minimal assistance) Rolling Left 4 (Minimal assistance) Supine to Sit 3 (Moderate assistance) Sit to Stand Moderate assistance Sit to Supine 3 (Moderate assistance) Rolling Right Rolling Left Supine to Sit Sit to Stand Minimal assistance Sit to Supine 3 (Moderate assistance) Locomotion (W/C) Able to Propel (ft): 150 feet Functional Level: 4 Curbs/Ramps Assist Required (FIM Score): 0 (Not tested) Wheelchair Setup Assist Required : 3 (Moderate assistance) Wheelchair Management: Manages left brake, Manages right brake Function 5 Setup Assistance 3 (Moderate assistance)(for B leg rests) Locomotion (W/C distance) 150 Feet 225 feet Locomotion (Walk) 1 (Dependent/total assistance) Locomotion (Walk dist.) 0 Feet (ft) Steps/Stairs Steps/Stairs Ambulated (#): 0 Level of Assist : 0 (Not tested) Rail Use: None Nursing:  
Neuro:   AAA&O x 4 Respiratory:   [x] WNL   [] O2 LPM:  
Other: 
Peripheral Vascular:   [x] TEDS present   [] Edema present ____ Grade Cardiac:   [x] WNL   [] Other Genitourinary:   [x] continent   [] incontinent   [] jasso  Abdominal ____07/09___ LBM 
GI: ___cardiac____ Diet _Thin_____ Liquids _____ tube feeds Musculoskeletal: ____ ROM Transfers ___wheelchair__ Assistive Device Used 
_1 assist___ Level of Assistance Skin Integumentary:   [x] Intact   [] Not Intact   ___incision healing FABIANA with steri strips_______Preventative Measures Details______________________________________________________________ Pain: [x] Controlled   [] Not Controlled   Pain Meds:   [] Scheduled   [] PRN Registered Dietitian / Nutrition:  
No data found. Pt reported poor appetite and meal intake PTA; fair today. C/o all food is tasting like rubber. Tolerating diet. Food preferences discussed. Agreeable to nutrition supplements Supplements:          [] Yes   [x] No     
Amount of supplement consumed: not applicable No intake or output data in the 24 hours ending 07/11/19 2217 Last bowel movement: 7/9 Interdisciplinary Team Goals: 1. Discipline  Physical Therapy Goal  Encourage pt to maintain left LE NWB with all functional transfers with minimal assistance Barrier  Impaired balance, Impaired strength, Decreased endurance Intervention  Education, Strength and Balance training, Functional transfer training Goal written by:   Alonso Jarrell PT, DPT  
 
2. Discipline  Occupational Therapy Goal  Pt will increase LB dressing to JÄCKVIK using AE. Barrier  Decrease LE mobility and activity tolerance Intervention  Therex, Theract, Self Care Retraining, Education Goal written by: 3. Discipline  Speech Therapy Goal    
 Barrier Intervention Goal written by: 4. Discipline  Nursing Goal  By discharge, patient pain level will be less then 5 with activity and less than 3 at rest  
 Barrier  surgery site Intervention  Pain meds are requested in time before pain gets out of contol Goal written by:  Chris De Leon LPN 5. Discipline  Clinical Psychology Goal  Minimize subjective stress with recovery Barrier  Situational stress in recovery Intervention  Patient education and support  Goal written by:  Leon Webb, PhD  
 
6. Discipline  Nutrition / Dietetics Goal  Po intake of meals will meet >75% of patient estimated nutritional needs within the next 7 days. Outcome:  [] Met/Ongoing    [] Progressing towards goal    [] Not progressing towards goal    [x] New/Initial goal   
 Barrier  appetite Intervention  continue po diet. Add food/beverage preferences. Add supplement: Ensure Enlive TID Goal written by:  Jose Fowler RD Disposition / Discharge Planning: Follow-up services:  [x] Physical Therapy [x] Occupational Therapy     
 [] Speech Therapy         
 [] Skilled Nursing [] Medical Social Worker 
 [] Aide        [] Outpatient [x] Home Health 
 [] [address] 
 [] [facility] DME recommendations:  RW, Wheelchair, Ramp Estimated discharge date:  tbd Discharge Location:  [x] Home   [] [facility]   [] TBD [] Other:   
 
 
 
Interdisciplinary team rounds were held this PM with the following team members:   
 
 Name Physical Therapist 
  Rosie Calle, PT, DPT Occupational Therapist 
  Gina Narvaez, MSOTR/L Recreational Therapist 
  Zeus Heredia, [address] Nursing Audie Stanton RN Physician 
  Shirley Randolph MD   
 Andrea Spencer, MSLING Signed:  Shirley Randolph MD 
  July 11, 2019

## 2019-07-11 NOTE — REHAB NOTE
[facility] PHYSICAL REHABILITATION  [address] [place] [address]  OVERALL PLAN OF CARE    Name: Winnie Turcios CSN: [id number]   Age: 80 y.o. MRN: [id number]   Sex: male Admit Date: 7/9/2019     Primary Rehabilitation Diagnosis  1. Impaired Mobility and ADLs  2. S/P Open reduction internal fixation of displaced subtrochanteric periprosthetic fracture around internal prosthetic left hip joint (6/25/2019 - Dr. Farhan West)  3. History of displaced subtrochanteric periprosthetic fracture of left femur around internal prosthetic left hip joint; S/P Left total hip replacement (2/18/2019 - Dr. 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    Mild aortic stenosis I35.0    Acute blood loss as cause of postoperative anemia D62    Acute pulmonary embolism  I26.99    Anticoagulated on warfarin Z79.01    Primary osteoarthritis involving multiple joints M15.0    Gastroesophageal reflux disease K21.9    Dyslipidemia E78.5    Diverticulosis K57.90    History of deep vein thrombosis of lower extremity Z86.718    Sick sinus syndrome  I49.5    Benign prostatic hyperplasia with lower urinary tract symptoms N40.1    Tremor R25.1    Obesity, Class I, BMI 30-34.9 E66.9    Vitamin D deficiency E55.9    Essential tremor G25.0    History of pericarditis Z86.79        ANTICIPATED INTERVENTIONS THAT SUPPORT THE MEDICAL NECESSITY OF THIS ADMISSION:    I. Physical Therapy              A. Intensity: 1.5 hour per day              B. Frequency: 5 times per week              C. Duration: 2-3 weeks              D. Long Term Goals:    1. Patient will move from supine to sit and sit to supine , scoot up and down and roll side to side in bed with modified independence. 2.  Patient will transfer from bed to chair and chair to bed with supervision/set-up using the least restrictive device. 3.  Patient will perform sit to stand with supervision/set-up. 4.  Patient will ambulate with supervision/set-up for 50 feet with RW maintaining NWB left LE. 5.  Patient will propel w/c over level surfaces greater than 150 feet and over entryways, thresholds, and ramps with modified independence. E. Interventions: Interventions may include range of motion (AROM, PROM B LE/trunk), motor function (B LE/trunk strengthening/coordination), activity tolerance (vitals, oxygen saturation levels), bed mobility training, balance activities, gait training (progressive ambulation program), wheelchair management and functional transfer training. II. Occupational Therapy  21 . Intensity: 1.5 hour per day              B. Frequency: 5 times per week              C. Duration: 2 weeks              D. Long Term Goals:    1. Pt will perform self-feeding with I.    2. Pt will perform grooming with S.    3. Pt will perform UB bathing with S.    4. Pt will perform LB bathing with S.    5. Pt will perform tub/shower transfer with S.     6. Pt will perform UB dressing with S.    7. Pt will perform LB dressing with S.    8. Pt will perform toileting task with [facility]. 9. Pt will perform toilet transfer with S.   E. Interventions: Interventions may include range of motion (AROM, PROM B UE), motor function (B UE/ strengthening/coordination), activity tolerance (vitals, oxygen saturation levels), balance training, ADL/IADL training and functional transfer training. PHYSICIAN'S ASSESSMENT OF FINDINGS:    Are the established goals sufficient for achieving the optimal level of function? [x]  Yes      []  No    What changes would you recommend to the goals as written? None      Are the interventions noted sufficient for achieving the optimal level of function?     [x]  Yes      []  No    What changes would you recommend to the interventions noted? If therapy staff is unable to provide 3 hr of total therapy per day in 5 days due to medical issues or decreased patient tolerance, may modify treatment schedule to 15 hr/week.       Estimated length of stay: 2 weeks      Medical rehabilitation prognosis:    []  Excellent     [x]  Good     []  Fair     []  Guarded       Discharge Destination:     [x]  Home     []  [address]     []  Vincent Puckett     []  Jorge Luis     []  Jluis     []  Other:       Signed:    Annabelle Cohn MD    July 11, 2019

## 2019-07-11 NOTE — PROGRESS NOTES
[facility] PHYSICAL REHABILITATION  [address] [place] [address], [address]     INPATIENT REHABILITATION  DAILY PROGRESS NOTE     Date: 7/11/2019    Name: Tommy Lowery. Age / Sex: 80 y.o. / male   CSN: [id number] MRN: [id number]   Admit Date: 7/9/2019 Length of Stay: 2 days     Primary Rehab Diagnosis: Impaired Mobility and ADLs secondary to:  1. S/P Open reduction internal fixation of displaced subtrochanteric periprosthetic fracture around internal prosthetic left hip joint (6/25/2019 - Dr. Abhay Cardona)  2. History of displaced subtrochanteric periprosthetic fracture of left femur around internal prosthetic left hip joint; S/P Left total hip replacement (2/18/2019 - Dr. Abhay Cardona)      Subjective:     Patient seen and examined. Blood pressure controlled. Team conference was held at bedside this PM.     Patient's Complaint:   No significant medical complaints    Pain Control: ongoing significant pain in which is stable and controlled by current meds      Objective:     Vital Signs:  Patient Vitals for the past 24 hrs:   BP Temp Pulse Resp SpO2 Height Weight   07/11/19 0745 112/68 97.4 °F (36.3 °C) 81 19 95 %     07/10/19 2129 133/72 98.5 °F (36.9 °C) 68 18 96 %     07/10/19 1633      6' 2\" (1.88 m) 103.4 kg (228 lb)   07/10/19 1615 131/76 98.8 °F (37.1 °C) 67 19 98 %          Physical Examination:  GENERAL SURVEY: Patient is awake, alert, oriented x 3, sitting comfortably on the chair, not in acute respiratory distress.   HEENT: pale palpebral conjunctivae, anicteric sclerae, no nasoaural discharge, moist oral mucosa  NECK: supple, no jugular venous distention, no palpable lymph nodes  CHEST/LUNGS: symmetrical chest expansion, good air entry, clear breath sounds  HEART: adynamic precordium, good S1 S2, no S3, regular rhythm, no murmurs  ABDOMEN: obese, bowel sounds appreciated, soft, non-tender  EXTREMITIES: pale nailbeds, no edema, full and equal pulses, no calf tenderness NEUROLOGICAL EXAM: The patient is awake, alert and oriented x3, able to answer questions fairly appropriately, able to follow 1 and 2 step commands. Able to tell time from the wall clock. Cranial nerves II-XII are grossly intact. No gross sensory deficit. Motor strength is 4+/5 on BUE, 4 to 4+/5 on the RLE, 2-/5 on the LLE (except 3- on the left ankle).      Incision(s): healing well, clean, dry, and intact      Current Medications:  Current Facility-Administered Medications   Medication Dose Route Frequency    cholecalciferol (VITAMIN D3) capsule 5,000 Units  5,000 Units Oral DAILY    calcium citrate tablet 200 mg [elemental]  200 mg Oral BID    warfarin - physician to dose   Other Q24H    ferrous sulfate tablet 325 mg  1 Tab Oral DAILY WITH BREAKFAST    ascorbic acid (vitamin C) (VITAMIN C) tablet 250 mg  250 mg Oral DAILY WITH BREAKFAST    methocarbamol (ROBAXIN) tablet 500 mg  500 mg Oral Q8H    acetaminophen (TYLENOL) tablet 650 mg  650 mg Oral Q4H PRN    bisacodyl (DULCOLAX) tablet 10 mg  10 mg Oral Q48H PRN    ciprofloxacin HCl (CIPRO) tablet 500 mg  500 mg Oral Q12H    HYDROcodone-acetaminophen (NORCO) 5-325 mg per tablet 1 Tab  1 Tab Oral Q4H PRN    simvastatin (ZOCOR) tablet 10 mg  10 mg Oral QHS       Allergies:  No Known Allergies      Functional Progress:    OCCUPATIONAL THERAPY    ON ADMISSION MOST RECENT   Eating  Functional Level: 5   Eating  Functional Level: 5     Grooming  Functional Level: 5   Grooming  Functional Level: 5     Bathing  Functional Level: 3(supine/ HOB raised)   Bathing  Functional Level: 3(supine/HOB EOB)     Upper Body Dressing  Functional Level: 5   Upper Body Dressing  Functional Level: 5     Lower Body Dressing  Functional Level: 3   Lower Body Dressing  Functional Level: 3     Toileting  Functional Level: 1   Toileting  Functional Level: 1     Toilet Transfers  Toilet Transfer Score: 3(using FWW)   Toilet Transfers  Toilet Transfer Score: 3     Luigi Torrez Transfers  Tub/Shower Transfer Score: 0   Tub/Shower Transfers  Tub/Shower Transfer Score: 0       Legend:   7 - Independent   6 - Modified Independent   5 - Standby Assistance / Supervision / Set-up   4 - Minimum Assistance / Contact Guard Assistance   3 - Moderate Assistance   2 - Maximum Assistance   1 - Total Assistance / Dependent       Lab/Data Review:  Recent Results (from the past 24 hour(s))   HGB & HCT    Collection Time: 07/11/19  6:24 AM   Result Value Ref Range    HGB 9.8 (L) 13.0 - 16.0 g/dL    HCT 30.4 (L) 36.0 - 48.0 %   PROTHROMBIN TIME + INR    Collection Time: 07/11/19  6:24 AM   Result Value Ref Range    Prothrombin time 24.9 (H) 11.5 - 15.2 sec    INR 2.3 (H) 0.8 - 1.2         Estimated Glomerular Filtration Rate:  On admission, estimated GFR based on a Creatinine of 0.89 mg/dl:              Using CKD-EPI = 78.5 mL/min/1.73m2              Using MDRD = 86.6 mL/min/1.73m2      Assessment:     Primary Rehabilitation Diagnosis  1. Impaired Mobility and ADLs  2. S/P Open reduction internal fixation of displaced subtrochanteric periprosthetic fracture around internal prosthetic left hip joint (6/25/2019 - Dr. Lori Robbins)  3.  History of displaced subtrochanteric periprosthetic fracture of left femur around internal prosthetic left hip joint; S/P Left total hip replacement (2/18/2019 - Dr. 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    Mild aortic stenosis I35.0    Acute blood loss as cause of postoperative anemia D62    Acute pulmonary embolism  I26.99    Anticoagulated on warfarin Z79.01    Primary osteoarthritis involving multiple joints M15.0    Gastroesophageal reflux disease K21.9    Dyslipidemia E78.5    Diverticulosis K57.90    History of deep vein thrombosis of lower extremity Z86.718    Sick sinus syndrome  I49.5    Benign prostatic hyperplasia with lower urinary tract symptoms N40.1    Tremor R25.1    Obesity, Class I, BMI 30-34.9 E66.9    Vitamin D deficiency E55.9    Essential tremor G25.0    History of pericarditis Z86.79        Plan:     1. Justification for continued stay: Good progression towards established rehabilitation goals. 2. Medical Issues being followed closely:    [x]  Fall and safety precautions     [x]  Wound Care     [x]  Bowel and Bladder Function     [x]  Fluid Electrolyte and Nutrition Balance     [x]  Pain Control      3. Issues that 24 hour rehabilitation nursing is following:    [x]  Fall and safety precautions     [x]  Wound Care     [x]  Bowel and Bladder Function     [x]  Fluid Electrolyte and Nutrition Balance     [x]  Pain Control      [x]  Assistance with and education on in-room safety with transfers to and from the bed, wheelchair, toilet and shower. 4. Acute rehabilitation plan of care:    [x]  Continue current care and rehab. [x]  Physical Therapy           [x]  Occupational Therapy           []  Speech Therapy     []  Hold Rehab until further notice     5. Medications:    [x]  MAR Reviewed     [x]  Continue Present Medications     6. DVT Prophylaxis:      []  Lovenox     []  Unfractionated Heparin     [x]  Coumadin     []  NOAC     []  WINDY Stockings     []  Sequential Compression Device     []  None     7. Orders:   > S/P Open reduction internal fixation of displaced subtrochanteric periprosthetic fracture around internal prosthetic left hip joint (6/25/2019 - Dr. Sonya Swartz);  History of displaced subtrochanteric periprosthetic fracture of left femur around internal prosthetic left hip joint; S/P Left total hip replacement (2/18/2019 - Dr. Sonya Swartz)              > Nonweightbearing on the left lower extremity              > Total hip precautions on left hip              > Staples were removed on 7/9/2019 > On 7/10/2019, started Calcium citrate 1 tab PO BID   > Continue Calcium citrate 1 tab PO BID     > Acute Postoperative Blood Loss Anemia              > Hgb/Hct (7/10/2019, on admission) = 8.8/28.0              > Anemia work-up showed serum iron 40, TIBC 237, iron % saturation 17, ferritin 568, reticulocyte count 5.0   > On 7/10/2019, started FeSO4 and Ascorbic acid   > Continue:               > FeSO4 325 mg PO once daily with breakfast                > Ascorbic Acid 250 mg PO once daily with breakfast (to enhance the absorption of the FeSO4)      > Acute pulmonary embolism, anticoagulated on Warfarin              > Target INR = 2 to 3              > INR (7/11/2019) = 2.3              > Patient received Warfarin 4 mg PO last night               > Warfarin 4 mg PO tonight                > Sepsis due to Pseudomonas aeruginosa              > Blood culture (7/5/2019; 1 out of 2 sets) yielded growth of PSeudomonas aeruginosa (PANSENSITIVE)              > On 7/5/2019, patient was empirically started on Piperacillin-Tazobactam 4.5 grams IV q 6 hr and a single dose of Vancomycin IV was given              > On 7/9/2019, Piperacillin-Tazobactam IV was discontinued and patient was discharged from [facility] on oral Ciprofloxacin 500 mg PO q 12 hr x 4 days              > Continue Ciprofloxacin 500 mg PO q 12 hr x 4 days     > Vitamin D Deficiency              > Vitamin D 25-Hydroxy (7/10/2019) = 18.6              > On 7/10/2019, patient was given Cholecalciferol 50,000 units PO x 1 dose               > Start Cholecalciferol 5,000 units PO once daily     > Analgesia   > Continue:               > Acetaminophen 650 mg PO q 4 hr PRN for pain level less than 5/10               > Methocarbamol 500 mg PO q 8 hr                > Norco 5/325 1 tab PO q 4 hr PRN for pain level greater than 4/10      > Diet:              > Specifications: Low saturated fat              > Solids (consistency): Regular > Liquids (consistency): Thin       8. Patient's progress in rehabilitation and medical issues discussed with the patient. All questions answered to the best of my ability. Care plan discussed with patient and nurse.       Signed:    Collette Rolls, MD    July 11, 2019

## 2019-07-11 NOTE — PROGRESS NOTES
[x] Psychology  [] Social Work [] Recreational Therapy    INTERVENTION  UNITS/TIME OF SERVICE   Assessment July 10, 2019   Supportive Counseling    Orientation    Discharge Planning    Resource Linkage              Progress/Current Status    Patient seen for Psychological Evaluation as requested on admission to ARU. Patient found sitting upright in wheelchair in room and not in any distress. He is able to describe much detail of his injury and expected recovery and insists that he is motivated to improve and regain functional status. He commented on history of multiple orthopedic problems and prior needs for surgery, apparently with generally good recoveries. Patient is not reporting any history of psychiatric services and is not requiring psychotropic medication on admit to ARU. He feels especially well supported by his wife who is a retired RN. Furthermore, both patient and spouse are familiar with ARU from previous admits of their family members, now many years ago. Patient will be monitored for any emergent, emotional and/or behavioral difficulties that might arise on ARU and he will be encouraged to persevere.     Jlail Albarado, PHD 7/11/2019 9:00 AM

## 2019-07-11 NOTE — PROGRESS NOTES
3  Problem: Mobility Impaired (Adult and Pediatric)  Goal: *Acute Goals and Plan of Care (Insert Text)  Description  Physical Therapy Goals  Short Term Goals  Initiated 7/10/2019 and to be accomplished within 7 day(s) (7/17/2019)  1. Patient will move from supine to sit and sit to supine  in bed with minimal assistance/contact guard assist.     2.  Patient will transfer from bed to chair and chair to bed with minimal assistance/contact guard assist using the least restrictive device. 3.  Patient will perform sit to stand with minimal assistance/contact guard assist.  4.  Patient will ambulate with moderate assistance  for 10 feet with the least restrictive device. Long Term Goals  Initiated 7/10/2019 and to be accomplished within 2-3 weeks (7/24/2019-7/31/2019)  1. Patient will move from supine to sit and sit to supine , scoot up and down and roll side to side in bed with modified independence. 2.  Patient will transfer from bed to chair and chair to bed with supervision/set-up using the least restrictive device. 3.  Patient will perform sit to stand with supervision/set-up. 4.  Patient will ambulate with supervision/set-up for 50 feet with RW maintaining NWB left LE. 5.  Patient will propel w/c over level surfaces greater than 150 feet and over entryways, thresholds, and ramps with modified independence. Outcome: Progressing Towards Goal     PHYSICAL THERAPY TREATMENT    Patient: Mauricio Renee (80 y.o. male)  Date: 7/11/2019  Diagnosis: Status post open reduction with internal fixation of fracture [Z96.7, Z87.81] Status post open reduction with internal fixation of fracture  Precautions: Fall Risk Precautions, NWB left LE, THP left LE  Chart, physical therapy assessment, plan of care and goals were reviewed. Time In: 1105  Time Out: 6592  Patient Seen For: Balance activities; Patient education; Therapeutic exercise;Transfer training  Pain:  Pt pain was reported as 8/10 pre-treatment.   Pt pain was reported as 8/10 post-treatment. Intervention: Pt's nurse notified and brought patient pain medication during treatment session. Patient identified with name and : Yes    SUBJECTIVE:     Pt is pleasant and cooperative during treatment session but requires reminders for total hip precautions (I.e. To not cross his legs). Pt explains how he has learned to adapt from having had multiple orthopedic surgeries. Pt is receptive to education re: importance of maintaining hip precautions. OBJECTIVE DATA SUMMARY:   Objective:     BED/MAT MOBILITY Daily Assessment    Sit to Supine : 3 (Moderate assistance)   Supine to Sit: 3 (Moderate assistance)  Pt requires moderate assistance for supine <> sit transfers for B LE management to maintain THP. Pt attempts to cross right LE under left LE despite PT assisting pt with pillow between knees to maintain precautions. TRANSFERS Daily Assessment    Other: stand step with RW  Transfer Assistance : 3 (Moderate assistance )  Sit to Stand Assistance: Minimal assistance   Pt at times requires multiple attempts but is able to perform sit to stand w/c or mat table to RW and stand to sit RW to w/c or mat table with minimal assistance for ant weight shift with sit to stand while maintaining left LE NWB and min assist for slow controlled descent with PT assisting to manage left LE to maintain left LE NWB. For stand step transfers, pt requires min assist for balance in standing and intermittent assistance for RW management with pt inconsistently demonstrating ability to maintain left LE NWB requiring min verbal cues. Pt able to clear right foot multiple times to step during transfer while wearing sneaker and maintaining left LE NWB.        BALANCE Daily Assessment    Sitting - Static: Good (unsupported)  Sitting - Dynamic: Good (unsupported)  Standing - Static: Poor  Standing - Dynamic : Impaired       WHEELCHAIR MOBILITY Daily Assessment    Able to Propel (ft): 225 feet over level tile floor, 200 feet outdoors over concrete sidewalk of varying grades. Functional Level: 5  Wheelchair Setup Assist Required : 3 (Moderate assistance)(for B leg rests)  Wheelchair Management: Manages left brake;Manages right brake  Pt requires distant supervision propelling w/c over level tile floor with minimal verbal cues for negotiating narrow entryways/obstacles safely. Pt requires close supervision propelling w/c over sidewalk of varying grades maintaining path and verbal cues for managing entryways safely and efficiently. THERAPEUTIC EXERCISES Daily Assessment    Supine LE Strength Training:  3 Sets of 10 Repetitions:  B Ankle Pumps  Right AROM, Left AAROM Hip Abd/Add to neutral  AAROM Left Heel Slides  Glut Sets x 5\" Holds  Quad Sets x 5\" Holds       ASSESSMENT:  Pt is progressing demonstrating minimal right foot clearance with stand step transfer this session and requiring decreased physical assistance for sit <> stand transfers. Progression toward goals:  x      Improving appropriately and progressing toward goals  ? Improving slowly and progressing toward goals  ? Not making progress toward goals and plan of care will be adjusted     PLAN:  Patient continues to benefit from skilled intervention to address the above impairments. Continue treatment per established plan of care. Discharge Recommendations:  Home Health Physical Therapy with 24 hour assistance  Further Equipment Recommendations for Discharge:  rolling walker, 18\" wheelchair, ramp     Estimated LOS: 2-3 weeks    Activity Tolerance:   Good  Please refer to the flowsheet for vital signs taken during this treatment. After treatment:   Patient left seated in w/c eating lunch in no apparent distress.       Irish Pinto PT, DPT  7/11/2019

## 2019-07-11 NOTE — PROGRESS NOTES
Problem: Self Care Deficits Care Plan (Adult)  Goal: *Therapy Goal (Edit Goal, Insert Text)  Description  Occupational Therapy Goals   Long Term Goals  Initiated 7/10/19 and to be accomplished within 2 week(s) 19  1. Pt will perform self-feeding with I.  2. Pt will perform grooming with S.  3. Pt will perform UB bathing with S.  4. Pt will perform LB bathing with S.  5. Pt will perform tub/shower transfer with S.   6. Pt will perform UB dressing with S.  7. Pt will perform LB dressing with S.  8. Pt will perform toileting task with [facility]. 9. Pt will perform toilet transfer with S.    Short Term Goals   Initiated 7/10/19 and to be accomplished within 7 day(s)  19  1. Pt will perform self-feeding with S.   2. Pt will perform grooming with S.  3. Pt will perform UB bathing with S.  4. Pt will perform LB bathing with [place]. 5. Pt will perform tub/shower transfer with [facility]. 6. Pt will perform UB dressing with S.  7. Pt will perform LB dressing with [place]. 8. Pt will perform toileting task with [place]. 9. Pt will perform toilet transfer with [facility]. Outcome: Progressing Towards Goal   OCCUPATIONAL THERAPY TREATMENT    Patient: Mauricio Romo.   80 y.o. Patient identified with name and : Yes     Date: 2019    First Tx Session  Time In: 0930  Time Out: 1100    Diagnosis: Status post open reduction with internal fixation of fracture [Z96.7, Z87.81]   Precautions:    Chart, occupational therapy assessment, plan of care, and goals were reviewed. Pain:  Pt reports 6/10 pain or discomfort prior to treatment. Pt reports pain is \"a lot better, now that I've been distracted from it\" post treatment. Intervention Provided: N/A      SUBJECTIVE:   Patient stated I have essential tremors that aren't essential. Yes, but my wife's quality of life is my quality of life.     OBJECTIVE DATA SUMMARY:     Pt sitting up in room with wife present upon approach for tx.  Discussed pt's leg rest for left LE with pt reporting this was uncomfortable and \"not the right size\". Pt requested to order his wheelchair for home early for use here in rehab. Pt and wife educated on discharge process and recommendations/orders/insurance requirements and process; both verbalized understanding. Pt's wife left prior to therapist's return with hip kit. Discussed purpose of LB AE to maintain hip precautions, pt very resistive at first. Education on purpose and increasing pt independence/quality of life (see above for pt comment on this statement). Pt agreeable and understanding given extra time benefits of use of LB AE. Pt very verbose throughout session, requiring frequent redirection; he likes to tell stories. THERAPEUTIC ACTIVITY Daily Assessment   Aid in pain reduction and increase independence with w/c in prep for ADLs/increasing use of B UEs  Extra time to make built-up leg rest for pt's wheelchair to increase independence with w/c mobility in prep for ADLs by using B UEs      LOWER BODY BATHING Daily Assessment    Lower Body Bathing  Bathing Assistance, Lower : 5 (Supervision)  Position Performed: Seated in chair  Adaptive Equipment: Long handled sponge  Comments: Simulated for education on use of LH sponge      LOWER BODY DRESSING Daily Assessment    Lower Body Dressing   Dressing Assistance : 3 (Moderate assistance)  Leg Crossed Method Used: No  Position Performed: Seated in chair  Adaptive Equipment Used: Long handled shoe horn;Reacher;Sock aid  Comments: Pt doffed socks with reacher. Education for use of sock aid with pt able to return demo to esther B socks with Supervision given extra time. Pt demo use of reacher to esther shorts over B feet with cues for technique given Mod A as pt attempted to put B feet in same shorts leg. Supervision to doff using reacher. Mod A to esther B shoes with pt using reacher/LH shoe horn as needed; Dep to fasten shoes.       ASSESSMENT:  Pt is making progress with LB dressing and presented with good carryover using AE after demo. Progression toward goals:  ?          Improving appropriately and progressing toward goals  ? Improving slowly and progressing toward goals  ? Not making progress toward goals and plan of care will be adjusted     PLAN:  Patient continues to benefit from skilled intervention to address the above impairments. Continue treatment per established plan of care. Discharge Recommendations:  Home Health  Further Equipment Recommendations for Discharge:  3:1 commode, tub transfer bench       Activity Tolerance:  Fair     Estimated LOS: 7/23/2019    Please refer to the flowsheet for vital signs taken during this treatment.   After treatment:   ?  Patient left in no apparent distress sitting up in wheelchair heading towards gym for PT session     TOMMIE Kerr

## 2019-07-11 NOTE — PROGRESS NOTES
SHIFT CHANGE NOTE FOR [facility]    Bedside and Verbal shift change report given to Demetrius Burns RN (oncoming nurse) by Yomaira Almendarez RN (offgoing nurse). Report included the following information SBAR, Kardex, MAR and Recent Results. Situation:   Code Status: Full Code   Reason for Admission: S/P ORIF  Hospital Day: 2   Problem List:   Hospital Problems  Date Reviewed: 2019          Codes Class Noted POA    Vitamin D deficiency (Chronic) ICD-10-CM: E55.9  ICD-9-CM: 268.9  7/10/2019 Yes    Overview Signed 7/10/2019 11:23 AM by Cal Hashimoto, MD     Vitamin D 25-Hydroxy (7/10/2019) = 18.6              Sepsis due to Pseudomonas species [facility]) ICD-10-CM: A41.52  ICD-9-CM: 038.43, 995.91  2019 Yes    Overview Addendum 2019  8:16 PM by Harleen Newby MD     19      80 y.o. male recently admitted for femur fracturesent from [facility] nursing [facility] for fever to 102.9. Patient was recently seen at [facility] for hip replacement status post fall. Patient endorses retrosternal chest pain that started upon waking this morning has been persistent since without propagating or palliating factors. Patient also endorses increased bilateral lower extremity swelling and difficulty breathing. WBC 29.2  P-87%,  U/A neg  CT  Heterogeneous collection posterior to the greater trochanter, most consistent with a postoperative hematoma. No rim enhancement to suggest abscess  CXR Small bilateral pleural effusions, left greater than right. Mild pulmonary edema.  No consolidation  19 ORTHO ([facility]) Benign appearing left hip and femur now 11 days s/p ORIF periprosthetic hip fracture                         Impaired mobility and ADLs ICD-10-CM: Z74.09  ICD-9-CM: 799.89  2019 Yes        Acute pulmonary embolism ([facility] [address] ICD-10-CM: I26.99  ICD-9-CM: 415.19  2019 Yes    Overview Signed 2019  5:07 PM by Cal Hashimoto, MD     Pulmonary emboli at the periphery of the right main pulmonary artery extending into the middle and lower lobe branches             Anticoagulated on warfarin (Chronic) ICD-10-CM: Z79.01  ICD-9-CM: V58.61  7/1/2019 Yes        * (Principal) Status post open reduction with internal fixation of fracture ICD-10-CM: Z96.7, Z87.81  ICD-9-CM: V45.89, V15.51  6/25/2019 Yes    Overview Signed 7/9/2019  4:52 PM by Grayson Dorsey MD     S/P Open reduction internal fixation of displaced subtrochanteric periprosthetic fracture around internal prosthetic left hip joint (6/25/2019 - Dr. Chyrel Mortimer)             Acute blood loss as cause of postoperative anemia ICD-10-CM: D62  ICD-9-CM: 285.1  6/25/2019 Yes        Periprosthetic fracture around internal prosthetic left hip joint ([facility] [address] ICD-10-CM: M97. 02XA  ICD-9-CM: 996.44, V43.64  6/24/2019 Yes    Overview Signed 7/9/2019  5:04 PM by Grayson Dorsey MD     S/P Open reduction internal fixation of displaced subtrochanteric periprosthetic fracture around internal prosthetic left hip joint (6/25/2019 - Dr. Chyrel Mortimer)             Displaced subtrochanteric fracture of left femur [facility]) ICD-10-CM: A53.21KT  ICD-9-CM: 820.22  6/24/2019 Yes    Overview Signed 7/9/2019  5:02 PM by Grayson Dorsey MD     S/P Open reduction internal fixation of displaced subtrochanteric periprosthetic fracture around internal prosthetic left hip joint (6/25/2019 - Dr. Chyrel Mortimer)             History of total left hip arthroplasty ICD-10-CM: W74.815  ICD-9-CM: V43.64  2/18/2019 Yes    Overview Addendum 7/9/2019  5:01 PM by Grayson Dorsey MD     S/P Left total hip replacement (2/18/2019 - Dr. Chyrel Mortimer)                   Background:   Past Medical History:   Past Medical History:   Diagnosis Date    Acute blood loss as cause of postoperative anemia 6/25/2019    Acute pulmonary embolism ([facility] [address] 7/1/2019    Pulmonary emboli at the periphery of the right main pulmonary artery extending into the middle and lower lobe branches    Anticoagulated on warfarin 7/1/2019    Benign prostatic hyperplasia with lower urinary tract symptoms     Demand ischemia of myocardium ([address] 7/6/2019 4/16/18 NST EF 59% , small, reversible apical defect 7/2/19 ECHO EF 68%, LVH 14/16, LAE, RVE, ANDREY 1.4cm2 with 12mm mean gradient    Diverticulosis     Dyslipidemia     Essential tremor     Gastroesophageal reflux disease     History of pericarditis     Obesity, Class I, BMI 30-34.9     Paroxysmal atrial fibrillation ([address]     CVAL Maddy Saleh FNP 2008  AF ablation 9/11/13 EPS with isolation of RSPV and LIPV 6/4/18 TEDDY  EF 55%, Residual 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 male recently admitted for femur fracturesent from [facility] nursing [facility] for fever to 102.9. Patient was recently seen at [facility] for hip replacement status post fall. Patient endorses retrosternal chest pain that started upon waking this morning has been persistent since without propagating or palliating factors.   Patient also endorses increased bilateral lower extremity s    Sick sinus syndrome ([address]     Vitamin D deficiency 7/10/2019    Vitamin D 25-Hydroxy (7/10/2019) = 18.6       Patient taking anticoagulants yes Coumadin   Patient has a defibrillator: no     Assessment:   Changes in Assessment throughout shift: no     Patient has central line: no Reasons if yes: na  Insertion date:na Last dressing date:na   Patient has Truong Cath: no Reasons if yes: n/a   Insertion date:n/a     Last Vitals:     Vitals:    07/10/19 1633 07/10/19 2129 07/11/19 0745 07/11/19 1613   BP:  133/72 112/68 137/72   Pulse:  68 81 68   Resp:  18 19 18   Temp:  98.5 °F (36.9 °C) 97.4 °F (36.3 °C) 97.2 °F (36.2 °C)   SpO2:  96% 95% 100%   Weight: 103.4 kg (228 lb)      Height: 6' 2\" (1.88 m)           PAIN    Pain Assessment    Pain Intensity 1: 8 (07/11/19 1848) Pain Intensity 1: 2 (12/29/14 1105)    Pain Location 1: Hip Pain Location 1: Abdomen    Pain Intervention(s) 1: Medication (see MAR) Pain Intervention(s) 1: Medication (see MAR)  Patient Stated Pain Goal: 0 Patient Stated Pain Goal: 0  o Intervention effective: yes    o Other actions taken for pain: no     Skin Assessment  Skin color Skin Color: Appropriate for ethnicity  Condition/Temperature Skin Condition/Temp: Dry, Warm  Integrity Skin Integrity: Incision (comment)  Turgor Turgor: Non-tenting  Weekly Pressure Ulcer Documentation  Pressure  Injury Documentation: No Pressure Injury Noted-Pressure Ulcer Prevention Initiated  Wound Prevention & Protection Methods  Orientation of wound Orientation of Wound Prevention: Posterior  Location of Prevention Location of Wound Prevention: Sacrum/Coccyx  Dressing Present Dressing Present : No  Dressing Status    Wound Offloading Wound Offloading (Prevention Methods): Bed, pressure redistribution/air, Repositioning     INTAKE/OUPUT  Date 07/10/19 1900 - 07/11/19 0659 07/11/19 0700 - 07/12/19 0659   Shift 4494-8085 24 Hour Total 8657-0644 5971-5748 24 Hour Total   INTAKE   P.O.   1440  1440     P. O.   1440  1440   Shift Total(mL/kg)   1440(13.9)  1440(13.9)   OUTPUT   Urine(mL/kg/hr)          Urine Occurrence(s) 1 x 1 x 5 x  5 x   Stool          Stool Occurrence(s) 0 x 0 x 0 x  0 x   Shift Total(mL/kg)        NET   1440  1440   Weight (kg) 103.4 103.4 103.4 103.4 103.4       Recommendations:  1. Patient needs and requests: addressed    2. Diet: cardiac    3. Pending tests/procedures: none     4. Functional Level/Equipment: whellchair    5. Estimated Discharge Date: TBD Posted on Whiteboard in Patients Room: no     HEALS Safety Check    A safety check occurred in the patient's room between off going nurse and oncoming nurse listed above.     The safety check included the below items  Area Items   H  High Alert Medications - Verify all high alert medication drips (heparin, PCA, etc.) E  Equipment - Suction is set up for ALL patients (with presley)  - Red plugs utilized for all equipment (IV pumps, etc.)  - WOWs wiped down at end of shift.  - Room stocked with oxygen, suction, and other unit-specific supplies   A  Alarms - Bed alarm is set for fall risk patients  - Ensure chair alarm is in place and activated if patient is up in a chair   L  Lines - Check IV for any infiltration  - Truong bag is empty if patient has a Truong   - Tubing and IV bags are labeled   S  Safety   - Room is clean, patient is clean, and equipment is clean. - Hallways are clear from equipment besides carts. - Fall bracelet on for fall risk patients  - Ensure room is clear and free of clutter  - Suction is set up for ALL patients (with presley)  - Hallways are clear from equipment besides carts.    - Isolation precautions followed, supplies available outside room, sign posted

## 2019-07-11 NOTE — PROGRESS NOTES
conducted an initial consultation and Spiritual Assessment for Julio Ardon, who is a 80 y.o.,male. Patient's Primary Language is: [place]. According to the patient's EMR Druze Affiliation is: [place]. The reason the Patient came to the hospital is:   Patient Active Problem List    Diagnosis Date Noted    Demand ischemia of myocardium ([facility] [address] 07/06/2019     Priority: 2 - Two    Cardiac pacemaker in situ 07/06/2019     Priority: 3 - Three    Vitamin D deficiency 07/10/2019    Essential tremor     History of pericarditis     Primary osteoarthritis involving multiple joints     Gastroesophageal reflux disease     Dyslipidemia     Diverticulosis     History of deep vein thrombosis of lower extremity     Sick sinus syndrome (HCC)     Benign prostatic hyperplasia with lower urinary tract symptoms     Tremor     Obesity, Class I, BMI 30-34.9     Chronic subdural hematoma (HCC) 07/06/2019    Mild aortic stenosis 07/06/2019    Paroxysmal atrial fibrillation ([address]     Sepsis due to Pseudomonas species ([facility] [address] 07/05/2019    Impaired mobility and ADLs 07/05/2019    Acute pulmonary embolism ([address] 07/01/2019    Anticoagulated on warfarin 07/01/2019    Status post open reduction with internal fixation of fracture 06/25/2019    Acute blood loss as cause of postoperative anemia 06/25/2019    Periprosthetic fracture around internal prosthetic left hip joint ([address] 06/24/2019    Displaced subtrochanteric fracture of left femur ([address] 06/24/2019    History of total left hip arthroplasty 02/18/2019    Groin pain, right 11/16/2018        The  provided the following Interventions:  Initiated a relationship of care and support with this very pleasant 80year old male patient. Explored issues of gabriela, belief, spirituality and Orthodox/ritual needs while hospitalized. Listened empathically as the patient shared the reason for his hospitalization.   Provided information about Spiritual Care Services. Offered assurance of continued prayers on patient's behalf. The following outcomes where achieved:  Patient shared limited information about both his medical narrative and spiritual journey/beliefs.  confirmed Patient's Judaism Affiliation in the [facility] tradition. Patient processed feeling about current hospitalization. Patient expressed gratitude for 's visit. Assessment:  Patient does not have any Nondenominational/cultural needs that will affect patient's preferences in health care. There are no spiritual or Nondenominational issues which require intervention at this time. Plan:  Chaplains will continue to follow and will provide pastoral care on an as needed/requested basis.  recommends bedside caregivers page  on duty if patient shows signs of acute spiritual or emotional distress.     [address] [place] [address]   [phone]

## 2019-07-11 NOTE — ROUTINE PROCESS
0800 Pt. Awake sitting up in bed no change in assessment no signs of distress pt. Stated to be feeling fine. 0930 Pt. Sitting up in chair eating breakfast. 
1200 pt. With therapy. 1330 able to transfer by wheelchair. 1500 no change in assessment. 1800 pt. Sitting up in chair eating dinner.

## 2019-07-11 NOTE — PROGRESS NOTES
SHIFT CHANGE NOTE FOR [facility]    Bedside and Verbal shift change report given to Chidi Meng LPN (oncoming nurse) by Tyree Chandler RN (offgoing nurse). Report included the following information SBAR, Kardex, MAR and Recent Results. Situation:   Code Status: Full Code   Reason for Admission: S/P ORIF  Hospital Day: 3   Problem List:   Hospital Problems  Date Reviewed: 7/11/2019          Codes Class Noted POA    Vitamin D deficiency (Chronic) ICD-10-CM: E55.9  ICD-9-CM: 268.9  7/10/2019 Yes    Overview Signed 7/10/2019 11:23 AM by Sherry North MD     Vitamin D 25-Hydroxy (7/10/2019) = 18.6              Sepsis due to Pseudomonas species [facility]) ICD-10-CM: A41.52  ICD-9-CM: 038.43, 995.91  7/5/2019 Yes    Overview Addendum 7/6/2019  8:16 PM by Marii Johnson MD     7/5/19      80 y.o. male recently admitted for femur fracturesent from skilled nursing [facility] for fever to 102.9. Patient was recently seen at [facility] for hip replacement status post fall. Patient endorses retrosternal chest pain that started upon waking this morning has been persistent since without propagating or palliating factors. Patient also endorses increased bilateral lower extremity swelling and difficulty breathing. WBC 29.2  P-87%,  U/A neg  CT  Heterogeneous collection posterior to the greater trochanter, most consistent with a postoperative hematoma. No rim enhancement to suggest abscess  CXR Small bilateral pleural effusions, left greater than right. Mild pulmonary edema.  No consolidation  7/6/19 ORTHO (Tracy Jackson) Benign appearing left hip and femur now 11 days s/p ORIF periprosthetic hip fracture                         Impaired mobility and ADLs ICD-10-CM: Z74.09  ICD-9-CM: 799.89  7/5/2019 Yes        Acute pulmonary embolism ([address] ICD-10-CM: I26.99  ICD-9-CM: 415.19  7/1/2019 Yes    Overview Signed 7/9/2019  5:07 PM by Sherry North MD     Pulmonary emboli at the periphery of the right main pulmonary artery extending into the middle and lower lobe branches             Anticoagulated on warfarin (Chronic) ICD-10-CM: Z79.01  ICD-9-CM: V58.61  7/1/2019 Yes        * (Principal) Status post open reduction with internal fixation of fracture ICD-10-CM: Z96.7, Z87.81  ICD-9-CM: V45.89, V15.51  6/25/2019 Yes    Overview Signed 7/9/2019  4:52 PM by Jesse Guardado MD     S/P Open reduction internal fixation of displaced subtrochanteric periprosthetic fracture around internal prosthetic left hip joint (6/25/2019 - Dr. Senthil Carrillo)             Acute blood loss as cause of postoperative anemia ICD-10-CM: D62  ICD-9-CM: 285.1  6/25/2019 Yes        Periprosthetic fracture around internal prosthetic left hip joint ([facility] [address] ICD-10-CM: M97. 02XA  ICD-9-CM: 996.44, V43.64  6/24/2019 Yes    Overview Signed 7/9/2019  5:04 PM by Jesse Guardado MD     S/P Open reduction internal fixation of displaced subtrochanteric periprosthetic fracture around internal prosthetic left hip joint (6/25/2019 - Dr. Senthil Carrillo)             Displaced subtrochanteric fracture of left femur [facility]) ICD-10-CM: A26.54RA  ICD-9-CM: 820.22  6/24/2019 Yes    Overview Signed 7/9/2019  5:02 PM by Jesse Guardado MD     S/P Open reduction internal fixation of displaced subtrochanteric periprosthetic fracture around internal prosthetic left hip joint (6/25/2019 - Dr. Senthil Carrillo)             History of total left hip arthroplasty ICD-10-CM: J54.630  ICD-9-CM: V43.64  2/18/2019 Yes    Overview Addendum 7/9/2019  5:01 PM by Jesse Guardado MD     S/P Left total hip replacement (2/18/2019 - Dr. Senthil Carrillo)                   Background:   Past Medical History:   Past Medical History:   Diagnosis Date    Acute blood loss as cause of postoperative anemia 6/25/2019    Acute pulmonary embolism ([facility] [address] 7/1/2019    Pulmonary emboli at the periphery of the right main pulmonary artery extending into the middle and lower lobe branches    Anticoagulated on warfarin 7/1/2019    Benign prostatic hyperplasia with lower urinary tract symptoms     Demand ischemia of myocardium ([address] 7/6/2019 4/16/18 NST EF 59% , small, reversible apical defect 7/2/19 ECHO EF 68%, LVH 14/16, LAE, RVE, ANDREY 1.4cm2 with 12mm mean gradient    Diverticulosis     Dyslipidemia     Essential tremor     Gastroesophageal reflux disease     History of pericarditis     Obesity, Class I, BMI 30-34.9     Paroxysmal atrial fibrillation ([address]     CVAL Maddy Saleh FNP 2008  AF ablation 9/11/13 EPS with isolation of RSPV and LIPV 6/4/18 TEDDY  EF 55%, 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 male recently admitted for femur fracturesent from [facility] nursing [facility] for fever to 102.9. Patient was recently seen at [facility] for hip replacement status post fall. Patient endorses retrosternal chest pain that started upon waking this morning has been persistent since without propagating or palliating factors.   Patient also endorses increased bilateral lower extremity s    Sick sinus syndrome ([address]     Vitamin D deficiency 7/10/2019    Vitamin D 25-Hydroxy (7/10/2019) = 18.6       Patient taking anticoagulants yes Coumadin   Patient has a defibrillator: no     Assessment:   Changes in Assessment throughout shift: no     Patient has central line: no Reasons if yes: na  Insertion date:na Last dressing date:na   Patient has Truong Cath: no Reasons if yes: n/a   Insertion date:n/a     Last Vitals:     Vitals:    07/10/19 2129 07/11/19 0745 07/11/19 1613 07/11/19 2100   BP: 133/72 112/68 137/72 131/68   Pulse: 68 81 68 66   Resp: 18 19 18 18   Temp: 98.5 °F (36.9 °C) 97.4 °F (36.3 °C) 97.2 °F (36.2 °C) 97.7 °F (36.5 °C)   SpO2: 96% 95% 100% 97%   Weight:       Height:            PAIN    Pain Assessment    Pain Intensity 1: 0 (07/12/19 0400) Pain Intensity 1: 2 (12/29/14 1105)    Pain Location 1: Hip Pain Location 1: Abdomen    Pain Intervention(s) 1: Medication (see MAR) Pain Intervention(s) 1: Medication (see MAR)  Patient Stated Pain Goal: 0 Patient Stated Pain Goal: 0  o Intervention effective: yes    o Other actions taken for pain: no     Skin Assessment  Skin color Skin Color: Appropriate for ethnicity  Condition/Temperature Skin Condition/Temp: Dry, Warm  Integrity Skin Integrity: Incision (comment)  Turgor Turgor: Non-tenting  Weekly Pressure Ulcer Documentation  Pressure  Injury Documentation: No Pressure Injury Noted-Pressure Ulcer Prevention Initiated  Wound Prevention & Protection Methods  Orientation of wound Orientation of Wound Prevention: Posterior  Location of Prevention Location of Wound Prevention: Sacrum/Coccyx  Dressing Present Dressing Present : No  Dressing Status    Wound Offloading Wound Offloading (Prevention Methods): Bed, pressure redistribution/air, Repositioning     INTAKE/OUPUT  Date 07/11/19 0700 - 07/12/19 0659 07/12/19 0700 - 07/13/19 0659   Shift 3623-7835 3632-0012 24 Hour Total 2191-0606 4517-7864 24 Hour Total   INTAKE   P.O. 1440  1440        P. O. 1440  1440      Shift Total(mL/kg) 1440(13.9)  1440(13.9)      OUTPUT   Urine(mL/kg/hr)  700(0.6) 700(0.3)        Urine Voided  700 700        Urine Occurrence(s) 5 x 3 x 8 x      Stool           Stool Occurrence(s) 0 x 0 x 0 x      Shift Total(mL/kg)  700(6.8) 700(6.8)      NET 1440 -700 740      Weight (kg) 103.4 103.4 103.4 103.4 103.4 103.4       Recommendations:  1. Patient needs and requests: addressed    2. Diet: cardiac    3. Pending tests/procedures: none     4. Functional Level/Equipment: whellchair    5. Estimated Discharge Date: TBD Posted on Whiteboard in Patients Room: no     HEALS Safety Check    A safety check occurred in the patient's room between off going nurse and oncoming nurse listed above.     The safety check included the below items  Area Items H  High Alert Medications - Verify all high alert medication drips (heparin, PCA, etc.)   E  Equipment - Suction is set up for ALL patients (with presley)  - Red plugs utilized for all equipment (IV pumps, etc.)  - WOWs wiped down at end of shift.  - Room stocked with oxygen, suction, and other unit-specific supplies   A  Alarms - Bed alarm is set for fall risk patients  - Ensure chair alarm is in place and activated if patient is up in a chair   L  Lines - Check IV for any infiltration  - Truong bag is empty if patient has a Truong   - Tubing and IV bags are labeled   S  Safety   - Room is clean, patient is clean, and equipment is clean. - Hallways are clear from equipment besides carts. - Fall bracelet on for fall risk patients  - Ensure room is clear and free of clutter  - Suction is set up for ALL patients (with presley)  - Hallways are clear from equipment besides carts.    - Isolation precautions followed, supplies available outside room, sign posted

## 2019-07-12 LAB
INR PPP: 2.5 (ref 0.8–1.2)
PROTHROMBIN TIME: 27.1 SEC (ref 11.5–15.2)

## 2019-07-12 PROCEDURE — 97530 THERAPEUTIC ACTIVITIES: CPT

## 2019-07-12 PROCEDURE — 97110 THERAPEUTIC EXERCISES: CPT

## 2019-07-12 PROCEDURE — 85610 PROTHROMBIN TIME: CPT

## 2019-07-12 PROCEDURE — 36415 COLL VENOUS BLD VENIPUNCTURE: CPT

## 2019-07-12 PROCEDURE — 65310000000 HC RM PRIVATE REHAB

## 2019-07-12 PROCEDURE — 97542 WHEELCHAIR MNGMENT TRAINING: CPT

## 2019-07-12 PROCEDURE — 97535 SELF CARE MNGMENT TRAINING: CPT

## 2019-07-12 PROCEDURE — 74011250637 HC RX REV CODE- 250/637: Performed by: INTERNAL MEDICINE

## 2019-07-12 RX ORDER — WARFARIN 3 MG/1
3 TABLET ORAL ONCE
Status: COMPLETED | OUTPATIENT
Start: 2019-07-12 | End: 2019-07-12

## 2019-07-12 RX ADMIN — HYDROCODONE BITARTRATE AND ACETAMINOPHEN 1 TABLET: 5; 325 TABLET ORAL at 16:46

## 2019-07-12 RX ADMIN — FERROUS SULFATE TAB 325 MG (65 MG ELEMENTAL FE) 325 MG: 325 (65 FE) TAB at 08:36

## 2019-07-12 RX ADMIN — Medication 5000 UNITS: at 08:36

## 2019-07-12 RX ADMIN — CALCIUM CITRATE 200 MG (950 MG) TABLET 200 MG: at 17:47

## 2019-07-12 RX ADMIN — ACETAMINOPHEN 650 MG: 325 TABLET ORAL at 20:40

## 2019-07-12 RX ADMIN — CIPROFLOXACIN HYDROCHLORIDE 500 MG: 500 TABLET, FILM COATED ORAL at 10:00

## 2019-07-12 RX ADMIN — WARFARIN SODIUM 3 MG: 3 TABLET ORAL at 17:48

## 2019-07-12 RX ADMIN — METHOCARBAMOL TABLETS 500 MG: 500 TABLET, COATED ORAL at 21:53

## 2019-07-12 RX ADMIN — METHOCARBAMOL TABLETS 500 MG: 500 TABLET, COATED ORAL at 13:59

## 2019-07-12 RX ADMIN — Medication 250 MG: at 08:36

## 2019-07-12 RX ADMIN — METHOCARBAMOL TABLETS 500 MG: 500 TABLET, COATED ORAL at 06:22

## 2019-07-12 RX ADMIN — CALCIUM CITRATE 200 MG (950 MG) TABLET 200 MG: at 08:36

## 2019-07-12 RX ADMIN — CIPROFLOXACIN HYDROCHLORIDE 500 MG: 500 TABLET, FILM COATED ORAL at 21:53

## 2019-07-12 RX ADMIN — HYDROCODONE BITARTRATE AND ACETAMINOPHEN 1 TABLET: 5; 325 TABLET ORAL at 09:27

## 2019-07-12 RX ADMIN — SIMVASTATIN 10 MG: 10 TABLET, FILM COATED ORAL at 20:40

## 2019-07-12 NOTE — PROGRESS NOTES
Problem: Mobility Impaired (Adult and Pediatric)  Goal: *Acute Goals and Plan of Care (Insert Text)  Description  Physical Therapy Goals  Short Term Goals  Initiated 7/10/2019 and to be accomplished within 7 day(s) (7/17/2019)  1. Patient will move from supine to sit and sit to supine  in bed with minimal assistance/contact guard assist.     2.  Patient will transfer from bed to chair and chair to bed with minimal assistance/contact guard assist using the least restrictive device. 3.  Patient will perform sit to stand with minimal assistance/contact guard assist.  4.  Patient will ambulate with moderate assistance  for 10 feet with the least restrictive device. Long Term Goals  Initiated 7/10/2019 and to be accomplished within 2-3 weeks (7/24/2019-7/31/2019)  1. Patient will move from supine to sit and sit to supine , scoot up and down and roll side to side in bed with modified independence. 2.  Patient will transfer from bed to chair and chair to bed with supervision/set-up using the least restrictive device. 3.  Patient will perform sit to stand with supervision/set-up. 4.  Patient will ambulate with supervision/set-up for 50 feet with RW maintaining NWB left LE. 5.  Patient will propel w/c over level surfaces greater than 150 feet and over entryways, thresholds, and ramps with modified independence. Outcome: Progressing Towards Goal     PHYSICAL THERAPY TREATMENT    Patient: Pamela Torres (80 y.o. male)  Date: 7/12/2019  Diagnosis: Status post open reduction with internal fixation of fracture [Z96.7, Z87.81] Status post open reduction with internal fixation of fracture  Precautions: Fall Risk Precautions, NWB left LE, THP left LE  Chart, physical therapy assessment, plan of care and goals were reviewed. Time In: 1000  Time Out: 1100  Patient Seen For: Balance activities; Wheelchair mobility;Transfer training;Patient education; Therapeutic exercise   Time In: 1400  Time Out:1430  Patient Seen For:Balance activities;Transfer training;Patient education  Pain:  Pt pain was reported as 7-8/10 pre-treatment. Pt pain was reported as 7-8/10 post-treatment. Intervention: Pt reports having received pain medication prior to treatment session. Patient identified with name and : yes    SUBJECTIVE:     PT attempted to see pt for scheduled treatment session at 0930. At this time, pt presents in bed with covers pulled up reporting he was feeling unwell, that he had recently received pain medication and was feeling nauseous with his wife present reporting he was cold and that his temperature was higher than it normally is. Offered pt 30 minute rest and spoke with nursing staff re: pt's concerns. Upon return to room at 1000 for treatment session, pt is initially agreeable to only bed level activity. However, with conversation and discussion, pt becomes mor agreeable to OOB activity stating, \"I'm feeling a bit better;I guess we can get up and I can go down to the gym with you. During PM session, pt participated in stand step transfer training with RW. As pt appeared fatigued, utilized ped-alert boot for audible feedback re: maintaining weight bearing status. Pt demonstrates minimal alerts with transition sit to stand but is inconsistent in maintaining left LE weight bearing through transfer. Pt appears disappointed and discouraged as PT educated pt re: purpose of transfer board noting, \"it feels like it's taking a step back. \"  However, pt is receptive to use of transfer board when fatigued in order to better ensure pt is able to safely maintain left LE NWB.     OBJECTIVE DATA SUMMARY:   Objective:     BED/MAT MOBILITY Daily Assessment    Supine to Sit : 4 (Minimal assistance) for left LE management from bed to dependent position       TRANSFERS Daily Assessment    Other: stand step with RW  Transfer Assistance : 4 (Minimal assistance)  Sit to Stand Assistance: Minimal assistance Pt requires minimal assistance for balance and weight shifting with max verbal cuing for maintaining left LE NWB. Pt is inconsistently able to maintain with attempt at stand step transfer to left due to fatigue with pt pivoting on right foot. During PM session, pt participated in one full transfer w/c <> mat table requiring assistance as described above while wearing ped-alert boot for audible feedback re: maintaining left LE NWB. Pt demonstrated minimal audible alerts with sit<> stand but was unable to consistently maintain left LE weight bearing status and was unable to un-weight right LE to step instead pivoting on right foot. Pt receptive to education re: use of transfer board for future transfers. GAIT Daily Assessment    Not safe to assess. BALANCE Daily Assessment    Sitting - Static: Good (unsupported)  Sitting - Dynamic: Fair (occasional)  Standing - Static: Poor  Standing - Dynamic : Impaired       WHEELCHAIR MOBILITY Daily Assessment    Functional Level: 5  Curbs/Ramps Assist Required (FIM Score): 5 (Supervision)  Wheelchair Setup Assist Required : 3 (Moderate assistance)(for B leg rest management)  Wheelchair Management: Manages left brake;Manages right brake   Pt propelled w/c over level surfaces on unit and negotiated a 50 foot ramp with supervision for safety and verbal cues for efficiency and negotiating narrow doorways and entryways. THERAPEUTIC EXERCISES Daily Assessment    3 Sets of 10 Repetitions  Glut Sets x 5\" Holds  Quad Sets x 5\" Holds       ASSESSMENT:  Pt continues to demonstrate functional weakness but was able to progress with w/c mobility this session negotiating a standard ramp with supervision despite c/o feeling unwell. Pt with increased difficulty maintaining left LE NWB with functional transfers this session and pt would benefit from participation in transfer training utilizing transfer board.   Progression toward goals:  X      Improving appropriately and progressing toward goals  ? Improving slowly and progressing toward goals  ? Not making progress toward goals and plan of care will be adjusted     PLAN:  Patient continues to benefit from skilled intervention to address the above impairments. Continue treatment per established plan of care. Assess lateral transfers utilizing transfer board. Discharge Recommendations:  Home Health to progress to Outpatient Physical Therapy with 24 hour assistance  Further Equipment Recommendations for Discharge:  rolling walker and wheelchair 18 inch width 18\" height, ramp for safe home entry     Estimated LOS: 2-3 weeks    Activity Tolerance:   Fair - pt limited this morning by c/o feeling unwell and c/o fatigue during afternoon treatment session. .  Please refer to the flowsheet for vital signs taken during this treatment. After treatment:   Patient left seated in w/c in no apparent distress following first and second treatment session awaiting OT treatment.       Mackenzie Ureña PT, DPT  7/12/2019

## 2019-07-12 NOTE — PROGRESS NOTES
SHIFT CHANGE NOTE FOR [facility]    Bedside and Verbal shift change report given to  Demi Anthony (oncoming nurse) by Jackie Ricci LPN (offgoing nurse). Report included the following information SBAR, Kardex, MAR and Recent Results. Situation:   Code Status: Full Code   Reason for Admission: S/P ORIF  Hospital Day: 3   Problem List:   Hospital Problems  Date Reviewed: 7/11/2019          Codes Class Noted POA    Vitamin D deficiency (Chronic) ICD-10-CM: E55.9  ICD-9-CM: 268.9  7/10/2019 Yes    Overview Signed 7/10/2019 11:23 AM by Sylvia Fisher MD     Vitamin D 25-Hydroxy (7/10/2019) = 18.6              Sepsis due to Pseudomonas species [facility]) ICD-10-CM: A41.52  ICD-9-CM: 038.43, 995.91  7/5/2019 Yes    Overview Addendum 7/6/2019  8:16 PM by Tracie Kc MD     7/5/19      80 y.o. male recently admitted for femur fracturesent from [facility] nursing [facility] for fever to 102.9. Patient was recently seen at [facility] for hip replacement status post fall. Patient endorses retrosternal chest pain that started upon waking this morning has been persistent since without propagating or palliating factors. Patient also endorses increased bilateral lower extremity swelling and difficulty breathing. WBC 29.2  P-87%,  U/A neg  CT  Heterogeneous collection posterior to the greater trochanter, most consistent with a postoperative hematoma. No rim enhancement to suggest abscess  CXR Small bilateral pleural effusions, left greater than right. Mild pulmonary edema.  No consolidation  7/6/19 ORTHO (Duane Nakai) Benign appearing left hip and femur now 11 days s/p ORIF periprosthetic hip fracture                         Impaired mobility and ADLs ICD-10-CM: Z74.09  ICD-9-CM: 799.89  7/5/2019 Yes        Acute pulmonary embolism ([facility] [address] ICD-10-CM: I26.99  ICD-9-CM: 415.19  7/1/2019 Yes    Overview Signed 7/9/2019  5:07 PM by Sylvia Fisher MD     Pulmonary emboli at the periphery of the right main pulmonary artery extending into the middle and lower lobe branches             Anticoagulated on warfarin (Chronic) ICD-10-CM: Z79.01  ICD-9-CM: V58.61  7/1/2019 Yes        * (Principal) Status post open reduction with internal fixation of fracture ICD-10-CM: Z96.7, Z87.81  ICD-9-CM: V45.89, V15.51  6/25/2019 Yes    Overview Signed 7/9/2019  4:52 PM by Sunday Hubbard MD     S/P Open reduction internal fixation of displaced subtrochanteric periprosthetic fracture around internal prosthetic left hip joint (6/25/2019 - Dr. Lori Robbins)             Acute blood loss as cause of postoperative anemia ICD-10-CM: D62  ICD-9-CM: 285.1  6/25/2019 Yes        Periprosthetic fracture around internal prosthetic left hip joint ([facility] [address] ICD-10-CM: M97. 02XA  ICD-9-CM: 996.44, V43.64  6/24/2019 Yes    Overview Signed 7/9/2019  5:04 PM by Sunday Hubbard MD     S/P Open reduction internal fixation of displaced subtrochanteric periprosthetic fracture around internal prosthetic left hip joint (6/25/2019 - Dr. Lori Robbins)             Displaced subtrochanteric fracture of left femur [facility]) ICD-10-CM: J22.60HF  ICD-9-CM: 820.22  6/24/2019 Yes    Overview Signed 7/9/2019  5:02 PM by Sunday Hubbard MD     S/P Open reduction internal fixation of displaced subtrochanteric periprosthetic fracture around internal prosthetic left hip joint (6/25/2019 - Dr. Lori Robbins)             History of total left hip arthroplasty ICD-10-CM: K35.867  ICD-9-CM: V43.64  2/18/2019 Yes    Overview Addendum 7/9/2019  5:01 PM by Sunday Hubbard MD     S/P Left total hip replacement (2/18/2019 - Dr. Lori Robbins)                   Background:   Past Medical History:   Past Medical History:   Diagnosis Date    Acute blood loss as cause of postoperative anemia 6/25/2019    Acute pulmonary embolism ([facility] [address] 7/1/2019    Pulmonary emboli at the periphery of the right main pulmonary artery extending into the middle and lower lobe branches    Anticoagulated on warfarin 7/1/2019    Benign prostatic hyperplasia with lower urinary tract symptoms     Demand ischemia of myocardium ([address] 7/6/2019 4/16/18 NST EF 59% , small, reversible apical defect 7/2/19 ECHO EF 68%, LVH 14/16, LAE, RVE, ANDREY 1.4cm2 with 12mm mean gradient    Diverticulosis     Dyslipidemia     Essential tremor     Gastroesophageal reflux disease     History of pericarditis     Obesity, Class I, BMI 30-34.9     Paroxysmal atrial fibrillation ([address]     CVAL Maddy Saleh FNP 2008  AF ablation 9/11/13 EPS with isolation of RSPV and LIPV 6/4/18 TEDDY  EF 55%, 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 male recently admitted for femur fracturesent from [facility] nursing [facility] for fever to 102.9. Patient was recently seen at [facility] for hip replacement status post fall. Patient endorses retrosternal chest pain that started upon waking this morning has been persistent since without propagating or palliating factors.   Patient also endorses increased bilateral lower extremity s    Sick sinus syndrome ([address]     Vitamin D deficiency 7/10/2019    Vitamin D 25-Hydroxy (7/10/2019) = 18.6       Patient taking anticoagulants yes Coumadin   Patient has a defibrillator: no     Assessment:   Changes in Assessment throughout shift: no     Patient has central line: no Reasons if yes: na  Insertion date:na Last dressing date:na   Patient has Truong Cath: no Reasons if yes: n/a   Insertion date:n/a     Last Vitals:     Vitals:    07/11/19 0745 07/11/19 1613 07/11/19 2100 07/12/19 0743   BP: 112/68 137/72 131/68 115/75   Pulse: 81 68 66 81   Resp: 19 18 18 18   Temp: 97.4 °F (36.3 °C) 97.2 °F (36.2 °C) 97.7 °F (36.5 °C) 99.3 °F (37.4 °C)   SpO2: 95% 100% 97% 96%   Weight:       Height:            PAIN    Pain Assessment    Pain Intensity 1: 0 (07/12/19 0400) Pain Intensity 1: 2 (12/29/14 1105)    Pain Location 1: Hip Pain Location 1: Abdomen    Pain Intervention(s) 1: Medication (see MAR) Pain Intervention(s) 1: Medication (see MAR)  Patient Stated Pain Goal: 0 Patient Stated Pain Goal: 0  o Intervention effective: yes    o Other actions taken for pain: no     Skin Assessment  Skin color Skin Color: Appropriate for ethnicity  Condition/Temperature Skin Condition/Temp: Dry, Warm  Integrity Skin Integrity: Incision (comment)  Turgor Turgor: Non-tenting  Weekly Pressure Ulcer Documentation  Pressure  Injury Documentation: No Pressure Injury Noted-Pressure Ulcer Prevention Initiated  Wound Prevention & Protection Methods  Orientation of wound Orientation of Wound Prevention: Posterior  Location of Prevention Location of Wound Prevention: Sacrum/Coccyx  Dressing Present Dressing Present : No  Dressing Status    Wound Offloading Wound Offloading (Prevention Methods): Bed, pressure redistribution/air, Repositioning     INTAKE/OUPUT  Date 07/11/19 0700 - 07/12/19 0659 07/12/19 0700 - 07/13/19 0659   Shift 2175-9981 2898-3625 24 Hour Total 3190-4095 6689-7812 24 Hour Total   INTAKE   P.O. 1440  1440        P. O. 1440  1440      Shift Total(mL/kg) 1440(13.9)  1440(13.9)      OUTPUT   Urine(mL/kg/hr)  700(0.6) 700(0.3)        Urine Voided  700 700        Urine Occurrence(s) 5 x 3 x 8 x      Stool           Stool Occurrence(s) 0 x 0 x 0 x      Shift Total(mL/kg)  700(6.8) 700(6.8)      NET 1440 -700 740      Weight (kg) 103.4 103.4 103.4 103.4 103.4 103.4       Recommendations:  1. Patient needs and requests: addressed    2. Diet: cardiac    3. Pending tests/procedures: none     4. Functional Level/Equipment: whellchair    5. Estimated Discharge Date: TBD Posted on Whiteboard in Patients Room: no     HEALS Safety Check    A safety check occurred in the patient's room between off going nurse and oncoming nurse listed above.     The safety check included the below items  Area Items   H  High Alert Medications - Verify all high alert medication drips (heparin, PCA, etc.)   E  Equipment - Suction is set up for ALL patients (with presley)  - Red plugs utilized for all equipment (IV pumps, etc.)  - WOWs wiped down at end of shift.  - Room stocked with oxygen, suction, and other unit-specific supplies   A  Alarms - Bed alarm is set for fall risk patients  - Ensure chair alarm is in place and activated if patient is up in a chair   L  Lines - Check IV for any infiltration  - Truong bag is empty if patient has a Truong   - Tubing and IV bags are labeled   S  Safety   - Room is clean, patient is clean, and equipment is clean. - Hallways are clear from equipment besides carts. - Fall bracelet on for fall risk patients  - Ensure room is clear and free of clutter  - Suction is set up for ALL patients (with presley)  - Hallways are clear from equipment besides carts.    - Isolation precautions followed, supplies available outside room, sign posted

## 2019-07-12 NOTE — PROGRESS NOTES
Problem: Self Care Deficits Care Plan (Adult)  Goal: *Therapy Goal (Edit Goal, Insert Text)  Description  Occupational Therapy Goals   Long Term Goals  Initiated 7/10/19 and to be accomplished within 2 week(s) 19  1. Pt will perform self-feeding with I.  2. Pt will perform grooming with S.  3. Pt will perform UB bathing with S.  4. Pt will perform LB bathing with S.  5. Pt will perform tub/shower transfer with S.   6. Pt will perform UB dressing with S.  7. Pt will perform LB dressing with S.  8. Pt will perform toileting task with [facility]. 9. Pt will perform toilet transfer with S.    Short Term Goals   Initiated 7/10/19 and to be accomplished within 7 day(s)  19  1. Pt will perform self-feeding with S.   2. Pt will perform grooming with S.  3. Pt will perform UB bathing with S.  4. Pt will perform LB bathing with [place]. 5. Pt will perform tub/shower transfer with [facility]. 6. Pt will perform UB dressing with S.  7. Pt will perform LB dressing with [place]. 8. Pt will perform toileting task with [place]. 9. Pt will perform toilet transfer with [facility]. Outcome: Progressing Towards Goal   OCCUPATIONAL THERAPY TREATMENT    Patient: Radha Kilgore.   80 y.o. Patient identified with name and : yes    Date: 2019    First Tx Session  Time In: 5  Time Out[de-identified] 3304    Second Tx Session  Time In: 232  Time Out[de-identified] 310    Diagnosis: Status post open reduction with internal fixation of fracture [Z96.7, Z87.81]   Precautions:  NWB L LE, Hip precautions   Chart, occupational therapy assessment, plan of care, and goals were reviewed. Pain:  Pt reports 8/10 pain or discomfort prior to treatment. Pt reports 8/10 pain or discomfort post treatment. Intervention Provided: Nursing providing pain medicine at 10am prior to tx. Pt provided with positioning at end of 2nd tx to decrease L LE pain in recliner chair.         SUBJECTIVE:   Patient stated Frederick Millan been  61 years, my wife likes to help me with stuff.  \"My wife and I will be going to a handicap accessible motel when I leave here, for a month. Then we plan on moving to [place]. \"     OBJECTIVE DATA SUMMARY:   Pt approached for 1st tx seated in w/c in therapy gym, finishing PT, pt introduced to novel OT w/ pt pleasant and agreeable. Pt providing past medical hx for OT prior to beginning tx. Pt ended 1st session seated in w/c in room w/ wife present and call bell within reach. Pt approached for 2nd tx seated in w/c, finishing PT, pt agreeable. PT reporting to OT that pt stating increased fatigue and unsure if he can maintain WB status at this time. Pt agreeable to perform AE training in room, as opposed to functional transfer toileting training. Pt brought to room for remainder of tx. THERAPEUTIC EXERCISE Daily Assessment   Pt performed UB therex to facilitate increased UB strength, endurance, and activity tolerance necessary for increased engagement in ADLs, maintaining NWB status with RW, and functional transfer mobility. 1st session: Performed UB arm bike seated in w/c x 15 minutes. Pt able to engage in conversation during final 5 minutes, demo'ing increased activity endurance. Seated edge of wheel chair to engage trunk/core strength, pt performed UB circuit of 3 x 15 bicep curls, chest press, and shoulder press with use of 5# dowel. Pt required rest breaks between reps as well as continuous cues for good sitting posture (pt demo'ing min kyphotic posture, reporting \"I've never had good posture. \")        LOWER BODY DRESSING Daily Assessment    2nd session:   Lower Body Dressing   Dressing Assistance : 4 (Contact guard assistance)  Leg Crossed Method Used: No  Position Performed: Seated in chair  Adaptive Equipment Used: Sock aid  Comments: Pt performed don/doff repetition training of donning socks w/ sock aide. Pt doffed right sock with alex CEDENO. Pt donned sock over 3x trials to problem solve why socks were only donning partial way up foot.  Pt requested to use reacher to assist with donning sock thru end range over heel. Pt may benefit from bariatric sock aide. MOBILITY/TRANSFERS Daily Assessment     2nd session: Pt performed w/c>recliner transfer with RW, requiring min v/c's for maintaining NMB status. Pt required CGA for stand and Edyta for RW management to descend after stand. Pt required 1x tactile asst for advancing left LE to maintain NWB. ASSESSMENT:  Pt requiring frequent re-directing for staying on task 2/2 to pt enjoying sharing stories and talking with OT. Pt would benefit from functional transfer training at beginning of tx to decrease chance of fatigue later in afternoon. PT/OT collaboration for pt to utilize slide board when fatigue is increased to ensure NWB precaution. Progression toward goals:  ?          Improving appropriately and progressing toward goals  ? Improving slowly and progressing toward goals  ? Not making progress toward goals and plan of care will be adjusted     PLAN:  Patient continues to benefit from skilled intervention to address the above impairments. Continue treatment per established plan of care. Discharge Recommendations:  Home Health  Further Equipment Recommendations for Discharge:  TBD at next level of care 2/2 to pt d/c'ing to handicap motel room after ARU      Activity Tolerance:  Fair -  Pt fatiguing in afternoon session, unable to perform functional transfer training 2/2 to fatigue. Estimated LOS: 7/23/2019    Please refer to the flowsheet for vital signs taken during this treatment. After treatment:   ?   Patient left in no apparent distress sitting up in recliner chair   ? Call bell left within reach    COMMUNICATION/EDUCATION:   ? Home safety education was provided and the patient/caregiver indicated understanding. ? Patient/family have participated as able in goal setting and plan of care. ? Patient/family agree to work toward stated goals and plan of care.   ? Patient understands intent and goals of therapy, but is neutral about his/her participation. ? Patient is unable to participate in goal setting and plan of care.       Stevenson Berg, OTR/L

## 2019-07-12 NOTE — PROGRESS NOTES
[facility] PHYSICAL REHABILITATION  [address] [place] [address], [address]     INPATIENT REHABILITATION  DAILY PROGRESS NOTE     Date: 7/12/2019    Name: Pamela Be. Age / Sex: 80 y.o. / male   CSN: [id number] MRN: [id number]   Admit Date: 7/9/2019 Length of Stay: 3 days     Primary Rehab Diagnosis: Impaired Mobility and ADLs secondary to:  1. S/P Open reduction internal fixation of displaced subtrochanteric periprosthetic fracture around internal prosthetic left hip joint (6/25/2019 - Dr. Biju Bedolla)  2. History of displaced subtrochanteric periprosthetic fracture of left femur around internal prosthetic left hip joint; S/P Left total hip replacement (2/18/2019 - Dr. Biju Bedolla)      Subjective:     Patient seen and examined. Blood pressure controlled. Patient's Complaint:   No significant medical complaints    Pain Control: ongoing significant pain in which is stable and controlled by current meds      Objective:     Vital Signs:  Patient Vitals for the past 24 hrs:   BP Temp Pulse Resp SpO2   07/12/19 0743 115/75 99.3 °F (37.4 °C) 81 18 96 %   07/11/19 2100 131/68 97.7 °F (36.5 °C) 66 18 97 %   07/11/19 1613 137/72 97.2 °F (36.2 °C) 68 18 100 %        Physical Examination:  GENERAL SURVEY: Patient is awake, alert, oriented x 3, sitting comfortably on the chair, not in acute respiratory distress.   HEENT: pale palpebral conjunctivae, anicteric sclerae, no nasoaural discharge, moist oral mucosa  NECK: supple, no jugular venous distention, no palpable lymph nodes  CHEST/LUNGS: symmetrical chest expansion, good air entry, clear breath sounds  HEART: adynamic precordium, good S1 S2, no S3, regular rhythm, no murmurs  ABDOMEN: obese, bowel sounds appreciated, soft, non-tender  EXTREMITIES: pale nailbeds, no edema, full and equal pulses, no calf tenderness   NEUROLOGICAL EXAM: The patient is awake, alert and oriented x3, able to answer questions fairly appropriately, able to follow 1 and 2 step commands. Able to tell time from the wall clock. Cranial nerves II-XII are grossly intact. No gross sensory deficit. Motor strength is 4+/5 on BUE, 4 to 4+/5 on the RLE, 2-/5 on the LLE (except 3- on the left ankle).    Incision(s): healing well, clean, dry, and intact      Current Medications:  Current Facility-Administered Medications   Medication Dose Route Frequency    cholecalciferol (VITAMIN D3) capsule 5,000 Units  5,000 Units Oral DAILY    calcium citrate tablet 200 mg [elemental]  200 mg Oral BID    warfarin - physician to dose   Other Q24H    ferrous sulfate tablet 325 mg  1 Tab Oral DAILY WITH BREAKFAST    ascorbic acid (vitamin C) (VITAMIN C) tablet 250 mg  250 mg Oral DAILY WITH BREAKFAST    methocarbamol (ROBAXIN) tablet 500 mg  500 mg Oral Q8H    acetaminophen (TYLENOL) tablet 650 mg  650 mg Oral Q4H PRN    bisacodyl (DULCOLAX) tablet 10 mg  10 mg Oral Q48H PRN    ciprofloxacin HCl (CIPRO) tablet 500 mg  500 mg Oral Q12H    HYDROcodone-acetaminophen (NORCO) 5-325 mg per tablet 1 Tab  1 Tab Oral Q4H PRN    simvastatin (ZOCOR) tablet 10 mg  10 mg Oral QHS       Allergies:  No Known Allergies      Lab/Data Review:  Recent Results (from the past 24 hour(s))   PROTHROMBIN TIME + INR    Collection Time: 07/12/19  7:00 AM   Result Value Ref Range    Prothrombin time 27.1 (H) 11.5 - 15.2 sec    INR 2.5 (H) 0.8 - 1.2         Estimated Glomerular Filtration Rate:  On admission, estimated GFR based on a Creatinine of 0.89 mg/dl:              Using CKD-EPI = 78.5 mL/min/1.73m2              Using MDRD = 86.6 mL/min/1.73m2      Assessment:     Primary Rehabilitation Diagnosis  1. Impaired Mobility and ADLs  2. S/P Open reduction internal fixation of displaced subtrochanteric periprosthetic fracture around internal prosthetic left hip joint (6/25/2019 - Dr. Abhay Cardona)  3.  History of displaced subtrochanteric periprosthetic fracture of left femur around internal prosthetic left hip joint; S/P Left total hip replacement (2/18/2019 - Dr. 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    Mild aortic stenosis I35.0    Acute blood loss as cause of postoperative anemia D62    Acute pulmonary embolism  I26.99    Anticoagulated on warfarin Z79.01    Primary osteoarthritis involving multiple joints M15.0    Gastroesophageal reflux disease K21.9    Dyslipidemia E78.5    Diverticulosis K57.90    History of deep vein thrombosis of lower extremity Z86.718    Sick sinus syndrome  I49.5    Benign prostatic hyperplasia with lower urinary tract symptoms N40.1    Tremor R25.1    Obesity, Class I, BMI 30-34.9 E66.9    Vitamin D deficiency E55.9    Essential tremor G25.0    History of pericarditis Z86.79        Plan:     1. Justification for continued stay: Good progression towards established rehabilitation goals. 2. Medical Issues being followed closely:    [x]  Fall and safety precautions     [x]  Wound Care     [x]  Bowel and Bladder Function     [x]  Fluid Electrolyte and Nutrition Balance     [x]  Pain Control      3. Issues that 24 hour rehabilitation nursing is following:    [x]  Fall and safety precautions     [x]  Wound Care     [x]  Bowel and Bladder Function     [x]  Fluid Electrolyte and Nutrition Balance     [x]  Pain Control      [x]  Assistance with and education on in-room safety with transfers to and from the bed, wheelchair, toilet and shower. 4. Acute rehabilitation plan of care:    [x]  Continue current care and rehab. [x]  Physical Therapy           [x]  Occupational Therapy           []  Speech Therapy     []  Hold Rehab until further notice     5. Medications:    [x]  MAR Reviewed     [x]  Continue Present Medications     6.  DVT Prophylaxis:      []  Lovenox []  Unfractionated Heparin     [x]  Coumadin     []  NOAC     []  WINDY Stockings     []  Sequential Compression Device     []  None     7. Orders:   > S/P Open reduction internal fixation of displaced subtrochanteric periprosthetic fracture around internal prosthetic left hip joint (6/25/2019 - Dr. Austin Fitzgerald);  History of displaced subtrochanteric periprosthetic fracture of left femur around internal prosthetic left hip joint; S/P Left total hip replacement (2/18/2019 - Dr. Austin Fitzgerald)              > Nonweightbearing on the left lower extremity              > Total hip precautions on left hip              > Staples were removed on 7/9/2019               > On 7/10/2019, started Calcium citrate 1 tab PO BID   > Continue Calcium citrate 1 tab PO BID     > Acute Postoperative Blood Loss Anemia              > Hgb/Hct (7/10/2019, on admission) = 8.8/28.0              > Anemia work-up showed serum iron 40, TIBC 237, iron % saturation 17, ferritin 568, reticulocyte count 5.0   > On 7/10/2019, started FeSO4 and Ascorbic acid   > Continue:               > FeSO4 325 mg PO once daily with breakfast                > Ascorbic Acid 250 mg PO once daily with breakfast (to enhance the absorption of the FeSO4)      > Acute pulmonary embolism, anticoagulated on Warfarin              > Target INR = 2 to 3              > INR (7/12/2019) = 2.5              > Patient received Warfarin 4 mg PO last night               > Warfarin 3 mg PO tonight                > Sepsis due to Pseudomonas aeruginosa              > Blood culture (7/5/2019; 1 out of 2 sets) yielded growth of PSeudomonas aeruginosa (PANSENSITIVE)              > On 7/5/2019, patient was empirically started on Piperacillin-Tazobactam 4.5 grams IV q 6 hr and a single dose of Vancomycin IV was given              > On 7/9/2019, Piperacillin-Tazobactam IV was discontinued and patient was discharged from [facility] on oral Ciprofloxacin 500 mg PO q 12 hr x 4 days              > Continue Ciprofloxacin 500 mg PO q 12 hr x 4 days (STOP DATE: 7/13/2019)     > Vitamin D Deficiency              > Vitamin D 25-Hydroxy (7/10/2019) = 18.6              > On 7/10/2019, patient was given Cholecalciferol 50,000 units PO x 1 dose               > On 7/11/2019, started Cholecalciferol 5,000 units PO once daily   > Continue Cholecalciferol 5,000 units PO once daily     > Analgesia   > Continue:               > Acetaminophen 650 mg PO q 4 hr PRN for pain level less than 5/10               > Methocarbamol 500 mg PO q 8 hr                > Norco 5/325 1 tab PO q 4 hr PRN for pain level greater than 4/10      > Diet:              > Specifications: Low saturated fat              > Solids (consistency): Regular               > Liquids (consistency): Thin       8. Patient's progress in rehabilitation and medical issues discussed with the patient. All questions answered to the best of my ability. Care plan discussed with patient and nurse.       Signed:    Tiera Suazo MD    July 12, 2019

## 2019-07-13 LAB
INR PPP: 2.5 (ref 0.8–1.2)
PROTHROMBIN TIME: 27.3 SEC (ref 11.5–15.2)

## 2019-07-13 PROCEDURE — 97530 THERAPEUTIC ACTIVITIES: CPT

## 2019-07-13 PROCEDURE — 97110 THERAPEUTIC EXERCISES: CPT

## 2019-07-13 PROCEDURE — 97535 SELF CARE MNGMENT TRAINING: CPT

## 2019-07-13 PROCEDURE — 74011250637 HC RX REV CODE- 250/637: Performed by: FAMILY MEDICINE

## 2019-07-13 PROCEDURE — 65310000000 HC RM PRIVATE REHAB

## 2019-07-13 PROCEDURE — 36415 COLL VENOUS BLD VENIPUNCTURE: CPT

## 2019-07-13 PROCEDURE — 85610 PROTHROMBIN TIME: CPT

## 2019-07-13 PROCEDURE — 74011250637 HC RX REV CODE- 250/637: Performed by: INTERNAL MEDICINE

## 2019-07-13 RX ORDER — WARFARIN 4 MG/1
4 TABLET ORAL ONCE
Status: COMPLETED | OUTPATIENT
Start: 2019-07-13 | End: 2019-07-13

## 2019-07-13 RX ADMIN — ACETAMINOPHEN 650 MG: 325 TABLET ORAL at 23:03

## 2019-07-13 RX ADMIN — HYDROCODONE BITARTRATE AND ACETAMINOPHEN 1 TABLET: 5; 325 TABLET ORAL at 06:41

## 2019-07-13 RX ADMIN — HYDROCODONE BITARTRATE AND ACETAMINOPHEN 1 TABLET: 5; 325 TABLET ORAL at 14:05

## 2019-07-13 RX ADMIN — HYDROCODONE BITARTRATE AND ACETAMINOPHEN 1 TABLET: 5; 325 TABLET ORAL at 21:38

## 2019-07-13 RX ADMIN — CIPROFLOXACIN HYDROCHLORIDE 500 MG: 500 TABLET, FILM COATED ORAL at 10:30

## 2019-07-13 RX ADMIN — ACETAMINOPHEN 650 MG: 325 TABLET ORAL at 15:30

## 2019-07-13 RX ADMIN — CALCIUM CITRATE 200 MG (950 MG) TABLET 200 MG: at 17:18

## 2019-07-13 RX ADMIN — METHOCARBAMOL TABLETS 500 MG: 500 TABLET, COATED ORAL at 21:38

## 2019-07-13 RX ADMIN — CALCIUM CITRATE 200 MG (950 MG) TABLET 200 MG: at 08:31

## 2019-07-13 RX ADMIN — SIMVASTATIN 10 MG: 10 TABLET, FILM COATED ORAL at 21:38

## 2019-07-13 RX ADMIN — METHOCARBAMOL TABLETS 500 MG: 500 TABLET, COATED ORAL at 14:05

## 2019-07-13 RX ADMIN — Medication 250 MG: at 08:31

## 2019-07-13 RX ADMIN — METHOCARBAMOL TABLETS 500 MG: 500 TABLET, COATED ORAL at 06:41

## 2019-07-13 RX ADMIN — WARFARIN SODIUM 4 MG: 4 TABLET ORAL at 17:17

## 2019-07-13 RX ADMIN — FERROUS SULFATE TAB 325 MG (65 MG ELEMENTAL FE) 325 MG: 325 (65 FE) TAB at 08:31

## 2019-07-13 RX ADMIN — Medication 5000 UNITS: at 08:30

## 2019-07-13 NOTE — PROGRESS NOTES
Problem: Self Care Deficits Care Plan (Adult)  Goal: *Therapy Goal (Edit Goal, Insert Text)  Description  Occupational Therapy Goals   Long Term Goals  Initiated 7/10/19 and to be accomplished within 2 week(s) 19  1. Pt will perform self-feeding with I.  2. Pt will perform grooming with S.  3. Pt will perform UB bathing with S.  4. Pt will perform LB bathing with S.  5. Pt will perform tub/shower transfer with S.   6. Pt will perform UB dressing with S.  7. Pt will perform LB dressing with S.  8. Pt will perform toileting task with Clevester Meeter. 9. Pt will perform toilet transfer with S.    Short Term Goals   Initiated 7/10/19 and to be accomplished within 7 day(s)  19  1. Pt will perform self-feeding with S.   2. Pt will perform grooming with S.  3. Pt will perform UB bathing with S.  4. Pt will perform LB bathing with [place]. 5. Pt will perform tub/shower transfer with Clevester Meeter. 6. Pt will perform UB dressing with S.  7. Pt will perform LB dressing with [place]. 8. Pt will perform toileting task with [place]. 9. Pt will perform toilet transfer with Clevester Meeter. Outcome: Progressing Towards Goal   OCCUPATIONAL THERAPY TREATMENT    Patient: Avtar Villagomez.   80 y.o. Patient identified with name and : yes    Date: 2019    First Tx Session  Time In: 9:30  Time Out[de-identified] 11:30    Diagnosis: Status post open reduction with internal fixation of fracture [Z96.7, Z87.81]   Precautions:  fall risk  Chart, occupational therapy assessment, plan of care, and goals were reviewed. Pain:  Pt reports 0/10 pain or discomfort prior to treatment. Pt reports 0/10 pain or discomfort post treatment. Intervention Provided: na      SUBJECTIVE:   Patient stated ? I am tired today & looking forward to getting some rest later. ?    OBJECTIVE DATA SUMMARY:     THERAPEUTIC EXERCISE Daily Assessment   Seen in the gym for B UE strengthening & endurance He independently propelled himself to the gym this am.  He completed 15 minutes on the arm bike; green flex bar 40 reps of  exercises; green & blue digit flex 30 reps of 4 sets  & pinch strengthening; blue & red power web for wrist extension & digit flexion strength; 3-lb dowel 25 reps of 9 exercises; yellow SB weight bear 4 sets of 30 reps. He took breaks between exercises. GROOMING Daily Assessment    Grooming  Grooming Assistance : 7 (Independent)  Comments: completed at sink seated in his wc with oral care, face wash & hair care. ASSESSMENT:  Excellent tx participation & motivation noted today. He is eager for his independence & tx recovery. He completed all requested exercises & a good conversationalist during tx session today. Progression toward goals:  ?          Improving appropriately and progressing toward goals  ? Improving slowly and progressing toward goals  ? Not making progress toward goals and plan of care will be adjusted     PLAN:  Patient continues to benefit from skilled intervention to address the above impairments. Continue treatment per established plan of care. Discharge Recommendations: To Be Determined  Further Equipment Recommendations for Discharge:  N/A     Activity Tolerance:  Good    Estimated LOS:per original poc    Please refer to the flowsheet for vital signs taken during this treatment. After treatment:   ?  Patient left in no apparent distress sitting up in chair   ? Patient left in no apparent distress in bed  ? Call bell left within reach  ? Nursing notified  ? Caregiver present  ? Bed alarm activated    COMMUNICATION/EDUCATION:   ? Home safety education was provided and the patient/caregiver indicated understanding. ? Patient/family have participated as able in goal setting and plan of care. ? Patient/family agree to work toward stated goals and plan of care. ? Patient understands intent and goals of therapy, but is neutral about his/her participation. ?  Patient is unable to participate in goal setting and plan of care.       Sterling Moe, OTR/L

## 2019-07-13 NOTE — PROGRESS NOTES
Progress Note    Patient: Terell Milton. MRN: [id number]  CSN: [id number]    YOB: 1934  Age: 80 y.o. Sex: male    DOA: 7/9/2019 LOS:  LOS: 4 days                    Subjective:       Primary Rehab Diagnosis: Impaired Mobility and ADLs secondary to:  1. S/P Open reduction internal fixation of displaced subtrochanteric periprosthetic fracture around internal prosthetic left hip joint (6/25/2019 - Dr. Sonya Swartz)  2. History of displased subtrochanteric periprosthetic fracture of left femur around internal prosthetic left hip joint; S/P Left total hip replacement (2/18/2019 - Dr. Sonya Swartz)          Review of systems  General: No fevers or chills. Cardiovascular: No chest pain or pressure. No palpitations. Pulmonary: No shortness of breath, cough or wheeze. Gastrointestinal: No abdominal pain, nausea, vomiting or diarrhea. Had BM yesterday  Genitourinary: No dysuria. Musculoskeletal: Lt hip pain and spasm    Neurologic: No headache, generalized weakness    Objective:     Physical Exam:  Visit Vitals  /74 (BP 1 Location: Right arm, BP Patient Position: At rest)   Pulse 86   Temp 97 °F (36.1 °C)   Resp 18   Ht 6' 2\" (1.88 m)   Wt 103.4 kg (228 lb)   SpO2 96%   BMI 29.27 kg/m²        General:         Alert,no acute distress    HEENT: NC, Atraumatic. anicteric sclerae. Lungs: CTA Bilaterally. No Wheezing/Rhonchi/Rales. Heart:  Regular  Rhythm, soft systolic murmer  Abdomen: Soft, Non distended, Non tender.    Extremities: Trace lower limb edema incision Lt hip clean   Psych:    Not anxious or agitated. Neurologic:  CN 2-12 grossly intact, Alert and oriented X 3. No acute neurological                                 Deficits,     Intake and Output:  Current Shift:  No intake/output data recorded.   Last three shifts:  07/11 1901 - 07/13 0700  In: -   Out: 2625 [Urine:2625]    Labs: Results:       Chemistry No results for input(s): GLU, NA, K, CL, CO2, BUN, CREA, CA, AGAP, BUCR, TBIL, GPT, AP, TP, ALB, GLOB, AGRAT in the last 72 hours. CBC w/Diff Recent Labs     07/11/19  0624   HGB 9.8*   HCT 30.4*      Cardiac Enzymes No results for input(s): CPK, CKND1, APRIL in the last 72 hours. No lab exists for component: CKRMB, TROIP   Coagulation Recent Labs     07/13/19  0703 07/12/19  0700   PTP 27.3* 27.1*   INR 2.5* 2.5*       Lipid Panel No results found for: CHOL, CHOLPOCT, CHOLX, CHLST, CHOLV, 580134, HDL, LDL, LDLC, DLDLP, 026247, VLDLC, VLDL, TGLX, TRIGL, TRIGP, TGLPOCT, CHHD, CHHDX   BNP No results for input(s): BNPP in the last 72 hours. Liver Enzymes No results for input(s): TP, ALB, TBIL, AP, SGOT, GPT in the last 72 hours.     No lab exists for component: DBIL   Thyroid Studies No results found for: T4, T3U, TSH, TSHEXT       Procedures/imaging: see electronic medical records for all procedures/Xrays and details which were not copied into this note but were reviewed prior to creation of Plan    Medications:   Current Facility-Administered Medications   Medication Dose Route Frequency    cholecalciferol (VITAMIN D3) capsule 5,000 Units  5,000 Units Oral DAILY    calcium citrate tablet 200 mg [elemental]  200 mg Oral BID    warfarin - physician to dose   Other Q24H    ferrous sulfate tablet 325 mg  1 Tab Oral DAILY WITH BREAKFAST    ascorbic acid (vitamin C) (VITAMIN C) tablet 250 mg  250 mg Oral DAILY WITH BREAKFAST    methocarbamol (ROBAXIN) tablet 500 mg  500 mg Oral Q8H    acetaminophen (TYLENOL) tablet 650 mg  650 mg Oral Q4H PRN    bisacodyl (DULCOLAX) tablet 10 mg  10 mg Oral Q48H PRN    HYDROcodone-acetaminophen (NORCO) 5-325 mg per tablet 1 Tab  1 Tab Oral Q4H PRN    simvastatin (ZOCOR) tablet 10 mg  10 mg Oral QHS       Assessment/Plan     Principal Problem:    Status post open reduction with internal fixation of fracture (6/25/2019)      Overview: S/P Open reduction internal fixation of displaced subtrochanteric       periprosthetic fracture around internal prosthetic left hip joint       (6/25/2019 - Dr. Manuel Huntley)    Active Problems:    Sepsis due to Pseudomonas species [facility]) (7/5/2019)      Overview: 7/5/19              80 y.o. male recently admitted for femur fracturesent from skilled nursing       facility for fever to 102.9. Patient was recently seen at [facility] for       hip replacement status post fall. Patient endorses retrosternal chest       pain that started upon waking this morning has been persistent since       without propagating or palliating factors. Patient also endorses       increased bilateral lower extremity swelling and difficulty breathing. WBC 29.2  P-87%,  U/A neg      CT  Heterogeneous collection posterior to the greater trochanter, most       consistent with a postoperative hematoma. No rim enhancement to suggest       abscess      CXR Small bilateral pleural effusions, left greater than right. Mild       pulmonary edema.  No consolidation      7/6/19 ORTHO (At[facility] Chandra) Benign appearing left hip and femur now 11 days s/p       ORIF periprosthetic hip fracture                                          History of total left hip arthroplasty (2/18/2019)      Overview: S/P Left total hip replacement (2/18/2019 - Dr. Manuel Huntley)      Periprosthetic fracture around internal prosthetic left hip joint ([facility] [address] (6/24/2019)      Overview: S/P Open reduction internal fixation of displaced subtrochanteric       periprosthetic fracture around internal prosthetic left hip joint       (6/25/2019 - Dr. Manuel Huntley)      Displaced subtrochanteric fracture of left femur ([address] (6/24/2019)      Overview: S/P Open reduction internal fixation of displaced subtrochanteric       periprosthetic fracture around internal prosthetic left hip joint       (6/25/2019 - Dr. Manuel Huntley)      Impaired mobility and ADLs (7/5/2019)      Acute blood loss as cause of postoperative anemia (6/25/2019)      Acute pulmonary embolism ([address] (7/1/2019)      Overview: Pulmonary emboli at the periphery of the right main pulmonary artery       extending into the middle and lower lobe branches      Anticoagulated on warfarin (7/1/2019)      Vitamin D deficiency (7/10/2019)      Overview: Vitamin D 25-Hydroxy (7/10/2019) = 18.6     S/P Open reduction internal fixation of displaced subtrochanteric periprosthetic fracture around internal prosthetic left hip joint (6/25/2019 - Dr. Ti De León);  History of displaced subtrochanteric periprosthetic fracture of left femur around internal prosthetic left hip joint; S/P Left total hip replacement (2/18/2019 - Dr. Ti De León)  Nonweightbearing on the left lower extremity   Total hip precautions on left hip  Staples were removed on 7/9/2019    Calcium citrate 1 tab PO BID        Acute Postoperative Blood Loss Anemia  Ferrous sulfate  and Ascorbic acid                 Acute pulmonary embolism, anticoagulated on Warfarin   INR  2.5 Pt received  3 mg  Last night will give 4 mg x1 tonight recheck INR in AM     Sepsis due to Pseudomonas aeruginosa    Cipro  q 12 hr x 4 days (STOP DATE: 7/13/2019)      Vitamin D Deficiency  Continue Cholecalciferol 5,000 units PO once daily         Sneha Dukes MD  7/13/2019 1:44 PM

## 2019-07-13 NOTE — PROGRESS NOTES
SHIFT CHANGE NOTE FOR [facility]    Bedside and Verbal shift change report given to Gera Garcia RN (oncoming nurse) by Lionel Epley, RN (offgoing nurse). Report included the following information SBAR, Kardex, MAR and Recent Results. Situation:   Code Status: Full Code   Reason for Admission: S/P ORIF  Hospital Day: 4   Problem List:   Hospital Problems  Date Reviewed: 7/12/2019          Codes Class Noted POA    Vitamin D deficiency (Chronic) ICD-10-CM: E55.9  ICD-9-CM: 268.9  7/10/2019 Yes    Overview Signed 7/10/2019 11:23 AM by Lauren Nguyen MD     Vitamin D 25-Hydroxy (7/10/2019) = 18.6              Sepsis due to Pseudomonas species [facility]) ICD-10-CM: A41.52  ICD-9-CM: 038.43, 995.91  7/5/2019 Yes    Overview Addendum 7/6/2019  8:16 PM by Carol Nagy MD     7/5/19      80 y.o. male recently admitted for femur fracturesent from [facility] nursing [facility] for fever to 102.9. Patient was recently seen at [facility] for hip replacement status post fall. Patient endorses retrosternal chest pain that started upon waking this morning has been persistent since without propagating or palliating factors. Patient also endorses increased bilateral lower extremity swelling and difficulty breathing. WBC 29.2  P-87%,  U/A neg  CT  Heterogeneous collection posterior to the greater trochanter, most consistent with a postoperative hematoma. No rim enhancement to suggest abscess  CXR Small bilateral pleural effusions, left greater than right. Mild pulmonary edema.  No consolidation  7/6/19 ORTHO (Rosie Bustillos) Benign appearing left hip and femur now 11 days s/p ORIF periprosthetic hip fracture                         Impaired mobility and ADLs ICD-10-CM: Z74.09  ICD-9-CM: 799.89  7/5/2019 Yes        Acute pulmonary embolism ([address] ICD-10-CM: I26.99  ICD-9-CM: 415.19  7/1/2019 Yes    Overview Signed 7/9/2019  5:07 PM by Lauren Nguyen MD     Pulmonary emboli at the periphery of the right main pulmonary artery extending into the middle and lower lobe branches             Anticoagulated on warfarin (Chronic) ICD-10-CM: Z79.01  ICD-9-CM: V58.61  7/1/2019 Yes        * (Principal) Status post open reduction with internal fixation of fracture ICD-10-CM: Z96.7, Z87.81  ICD-9-CM: V45.89, V15.51  6/25/2019 Yes    Overview Signed 7/9/2019  4:52 PM by Alena Sogn MD     S/P Open reduction internal fixation of displaced subtrochanteric periprosthetic fracture around internal prosthetic left hip joint (6/25/2019 - Dr. Manuel Huntley)             Acute blood loss as cause of postoperative anemia ICD-10-CM: D62  ICD-9-CM: 285.1  6/25/2019 Yes        Periprosthetic fracture around internal prosthetic left hip joint ([facility] [address] ICD-10-CM: M97. 02XA  ICD-9-CM: 996.44, V43.64  6/24/2019 Yes    Overview Signed 7/9/2019  5:04 PM by Alena Song MD     S/P Open reduction internal fixation of displaced subtrochanteric periprosthetic fracture around internal prosthetic left hip joint (6/25/2019 - Dr. Manuel Huntley)             Displaced subtrochanteric fracture of left femur [facility]) ICD-10-CM: X33.07EF  ICD-9-CM: 820.22  6/24/2019 Yes    Overview Signed 7/9/2019  5:02 PM by Alena Song MD     S/P Open reduction internal fixation of displaced subtrochanteric periprosthetic fracture around internal prosthetic left hip joint (6/25/2019 - Dr. Manuel Huntley)             History of total left hip arthroplasty ICD-10-CM: M92.626  ICD-9-CM: V43.64  2/18/2019 Yes    Overview Addendum 7/9/2019  5:01 PM by Alena Song MD     S/P Left total hip replacement (2/18/2019 - Dr. Manuel Huntley)                   Background:   Past Medical History:   Past Medical History:   Diagnosis Date    Acute blood loss as cause of postoperative anemia 6/25/2019    Acute pulmonary embolism ([facility] [address] 7/1/2019    Pulmonary emboli at the periphery of the right main pulmonary artery extending into the middle and lower lobe branches    Anticoagulated on warfarin 7/1/2019    Benign prostatic hyperplasia with lower urinary tract symptoms     Demand ischemia of myocardium ([facility] [address] 7/6/2019 4/16/18 NST EF 59% , small, reversible apical defect 7/2/19 ECHO EF 68%, LVH 14/16, LAE, RVE, ANDREY 1.4cm2 with 12mm mean gradient    Diverticulosis     Dyslipidemia     Essential tremor     Gastroesophageal reflux disease     History of pericarditis     Obesity, Class I, BMI 30-34.9     Paroxysmal atrial fibrillation ([address]     CVAL Maddy Saleh FNP 2008  AF ablation 9/11/13 EPS with isolation of RSPV and LIPV 6/4/18 TEDDY  EF 55%, 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 male recently admitted for femur fracturesent from [facility] for fever to 102.9. Patient was recently seen at [facility] for hip replacement status post fall. Patient endorses retrosternal chest pain that started upon waking this morning has been persistent since without propagating or palliating factors.   Patient also endorses increased bilateral lower extremity s    Sick sinus syndrome ([address]     Vitamin D deficiency 7/10/2019    Vitamin D 25-Hydroxy (7/10/2019) = 18.6       Patient taking anticoagulants yes Coumadin   Patient has a defibrillator: no     Assessment:   Changes in Assessment throughout shift: no     Patient has central line: no Reasons if yes: na  Insertion date:na Last dressing date:na   Patient has Truong Cath: no Reasons if yes: n/a   Insertion date:n/a     Last Vitals:     Vitals:    07/12/19 1633 07/12/19 2153 07/13/19 0757 07/13/19 1547   BP: 133/72 152/72 124/74 133/72   Pulse: 72 77 86 77   Resp: 19 18 18 18   Temp: 98.5 °F (36.9 °C) 97.1 °F (36.2 °C) 97 °F (36.1 °C) 98.6 °F (37 °C)   SpO2: 99% 100% 96% 97%   Weight:       Height:            PAIN    Pain Assessment    Pain Intensity 1: 6 (07/13/19 1620) Pain Intensity 1: 2 (12/29/14 1105)    Pain Location 1: Leg Pain Location 1: Abdomen    Pain Intervention(s) 1: Medication (see MAR), Ice Pain Intervention(s) 1: Medication (see MAR)  Patient Stated Pain Goal: 0 Patient Stated Pain Goal: 0  o Intervention effective: yes    o Other actions taken for pain: no     Skin Assessment  Skin color Skin Color: Appropriate for ethnicity  Condition/Temperature Skin Condition/Temp: Warm, Dry  Integrity Skin Integrity: Incision (comment)  Turgor Turgor: Non-tenting  Weekly Pressure Ulcer Documentation  Pressure  Injury Documentation: No Pressure Injury Noted-Pressure Ulcer Prevention Initiated  Wound Prevention & Protection Methods  Orientation of wound Orientation of Wound Prevention: Posterior  Location of Prevention Location of Wound Prevention: Buttocks, Sacrum/Coccyx  Dressing Present Dressing Present : No  Dressing Status    Wound Offloading Wound Offloading (Prevention Methods): Bed, pressure reduction mattress, Adaptive equipment, Chair cushion, Pillows, Repositioning, Turning, Wheelchair, Walker     INTAKE/OUPUT  Date 07/12/19 1900 - 07/13/19 0659 07/13/19 0700 - 07/14/19 0659   Shift 1878-7284 24 Hour Total 3022-3873 9619-1442 24 Hour Total   INTAKE   Shift Total(mL/kg)        OUTPUT   Urine(mL/kg/hr) 1925 1925        Urine Voided 1925 1925        Urine Occurrence(s)  2 x 1 x  1 x   Emesis/NG output 0 0        Emesis 0 0      Stool 0 0        Stool Occurrence(s)  0 x 0 x  0 x     Stool 0 0      Shift Total(mL/kg) 1925(18.6) 1925(18.6)      NET -1925 -1925      Weight (kg) 103.4 103.4 103.4 103.4 103.4       Recommendations:  1. Patient needs and requests: addressed    2. Diet: cardiac    3. Pending tests/procedures: none     4. Functional Level/Equipment: whellchair    5.  Estimated Discharge Date: TBD Posted on Whiteboard in Patients Room: no     HEALS Safety Check    A safety check occurred in the patient's room between off going nurse and oncoming nurse listed above.    The safety check included the below items  Area Items   H  High Alert Medications - Verify all high alert medication drips (heparin, PCA, etc.)   E  Equipment - Suction is set up for ALL patients (with presley)  - Red plugs utilized for all equipment (IV pumps, etc.)  - WOWs wiped down at end of shift.  - Room stocked with oxygen, suction, and other unit-specific supplies   A  Alarms - Bed alarm is set for fall risk patients  - Ensure chair alarm is in place and activated if patient is up in a chair   L  Lines - Check IV for any infiltration  - Truong bag is empty if patient has a Truong   - Tubing and IV bags are labeled   S  Safety   - Room is clean, patient is clean, and equipment is clean. - Hallways are clear from equipment besides carts. - Fall bracelet on for fall risk patients  - Ensure room is clear and free of clutter  - Suction is set up for ALL patients (with presley)  - Hallways are clear from equipment besides carts.    - Isolation precautions followed, supplies available outside room, sign posted

## 2019-07-13 NOTE — PROGRESS NOTES
Problem: Mobility Impaired (Adult and Pediatric)  Goal: *Acute Goals and Plan of Care (Insert Text)  Description  Physical Therapy Goals  Short Term Goals  Initiated 7/10/2019 and to be accomplished within 7 day(s) (7/17/2019)  1. Patient will move from supine to sit and sit to supine  in bed with minimal assistance/contact guard assist.     2.  Patient will transfer from bed to chair and chair to bed with minimal assistance/contact guard assist using the least restrictive device. 3.  Patient will perform sit to stand with minimal assistance/contact guard assist.  4.  Patient will ambulate with moderate assistance  for 10 feet with the least restrictive device. Long Term Goals  Initiated 7/10/2019 and to be accomplished within 2-3 weeks (7/24/2019-7/31/2019)  1. Patient will move from supine to sit and sit to supine , scoot up and down and roll side to side in bed with modified independence. 2.  Patient will transfer from bed to chair and chair to bed with supervision/set-up using the least restrictive device. 3.  Patient will perform sit to stand with supervision/set-up. 4.  Patient will ambulate with supervision/set-up for 50 feet with RW maintaining NWB left LE. 5.  Patient will propel w/c over level surfaces greater than 150 feet and over entryways, thresholds, and ramps with modified independence. Outcome: Progressing Towards Goal   PHYSICAL THERAPY TREATMENT    Patient: Cesia Arnold (80 y.o. male)  Date: 7/13/2019  Diagnosis: Status post open reduction with internal fixation of fracture [Z96.7, Z87.81] Status post open reduction with internal fixation of fracture  Precautions:  Fall, NWB LLE, Total hip precautions  Chart, physical therapy assessment, plan of care and goals were reviewed. Time In: 1133  Time Out: 1233  Patient Seen For: Patient education; Therapeutic exercise;Transfer training; Wheelchair mobility  Pain:  Pt pain was reported as  3 pre-treatment.   Pt pain was reported as 3 post-treatment. (left knee)  Intervention: reposition    Patient identified with name and : Yes    SUBJECTIVE:     \"If I don't laugh at least once in here, something's not right. You have to have a sense of humor. \"    OBJECTIVE DATA SUMMARY:       BED/MAT MOBILITY Daily Assessment    Supine to Sit : 4 (Minimal assistance)  Sit to Supine : 4 (Minimal assistance)       TRANSFERS Daily Assessment    Sit to Stand Assistance: Minimal assistance(x3 trials; standing for apprx 15sec each with NWB LLE)  Performed mat <> WC transfer via transfer board; required cues for therapist to place chair on stronger Right side  Demos good carryover of sequencing and good body awareness of placing transfer board; Min VC's to avoid placing hand in hold cutout to avoid sitting on hand     BALANCE Daily Assessment    Sitting - Static: Good (unsupported)  Sitting - Dynamic: Good (unsupported)  Standing - Static: Poor  Standing - Dynamic : Impaired       WHEELCHAIR MOBILITY Daily Assessment    Functional Level: 5  Wheelchair Management: Manages left brake;Manages right brake  200ft; melody good safety awareness       THERAPEUTIC EXERCISES Daily Assessment     Supine on mat tab with bolster under BLE; performed all bilaterally       EXERCISE   Sets   Reps   Active Active Assist   Passive Self ROM   Comments   Ankle Pumps 1 30  ? ? ? ? Glute Sets 1 30 ? ? ? ? Hamstring Sets   ? ? ? ? Short Arc Quads 1 30 ? ? ? ? Heel Slides 2 15 ? ? ? ? Within hip precautions   Straight Leg Raises   ? ? ? ? Hip Abd/Add   ? ? ? ? Long Arc Quads   ? ? ? ? Seated Marching   ? ? ? ? Standing Marching   ? ? ? ? Supine to prop sitting on B elbows 1 10 ? ? ? ? Min/CG        ASSESSMENT:  Patient presents today alert and agreeable to therapy and was sitting in [place] in gym for session. Patient able to recall hip precautions including NWB status of LLE.  Assisted patient in transferring from [place] to mat table for exercises and performed transfer back to sitting on mat. Donned ped-alert boot for audible feedback to ensure compliance with left LE NWB. Patient had min to mod audible tones for compliance. Patient then used transfer board to return to [facility] before negotiating to dining room for lunch. Progression toward goals:  ?      Improving appropriately and progressing toward goals  ? Improving slowly and progressing toward goals  ? Not making progress toward goals and plan of care will be adjusted     PLAN:  Patient continues to benefit from skilled intervention to address the above impairments. Continue treatment per established plan of care. Discharge Recommendations:  Home Health to progress to Outpatient Physical Therapy with 24 hour assistance  Further Equipment Recommendations for Discharge:  rolling walker and wheelchair 18 inch width 18\" height, ramp for safe home entry     Estimated LOS:2-3 weeks    Activity Tolerance:   Patient tolerated activity well and demos fair to good carryover of learning; motivated to continue participation with therapy. Please refer to the flowsheet for vital signs taken during this treatment. After treatment:   Patient left seated in WC in dining johnson with chair alarm activated.       Gasper Alcaraz, PT  7/13/2019

## 2019-07-13 NOTE — PROGRESS NOTES
SHIFT CHANGE NOTE FOR [facility]    Bedside and Verbal shift change report given to Naa Leblanc RN (oncoming nurse) by Bryant Salcedo RN (offgoing nurse). Report included the following information SBAR, Kardex, MAR and Recent Results. Situation:   Code Status: Full Code   Reason for Admission: S/P ORIF  Hospital Day: 4   Problem List:   Hospital Problems  Date Reviewed: 7/12/2019          Codes Class Noted POA    Vitamin D deficiency (Chronic) ICD-10-CM: E55.9  ICD-9-CM: 268.9  7/10/2019 Yes    Overview Signed 7/10/2019 11:23 AM by Hermelinda Campos MD     Vitamin D 25-Hydroxy (7/10/2019) = 18.6              Sepsis due to Pseudomonas species [facility]) ICD-10-CM: A41.52  ICD-9-CM: 038.43, 995.91  7/5/2019 Yes    Overview Addendum 7/6/2019  8:16 PM by Kary Doyle MD     7/5/19      80 y.o. male recently admitted for femur fracturesent from [facility] nursing [facility] for fever to 102.9. Patient was recently seen at [facility] for hip replacement status post fall. Patient endorses retrosternal chest pain that started upon waking this morning has been persistent since without propagating or palliating factors. Patient also endorses increased bilateral lower extremity swelling and difficulty breathing. WBC 29.2  P-87%,  U/A neg  CT  Heterogeneous collection posterior to the greater trochanter, most consistent with a postoperative hematoma. No rim enhancement to suggest abscess  CXR Small bilateral pleural effusions, left greater than right. Mild pulmonary edema.  No consolidation  7/6/19 ORTHO (Tommy Nones) Benign appearing left hip and femur now 11 days s/p ORIF periprosthetic hip fracture                         Impaired mobility and ADLs ICD-10-CM: Z74.09  ICD-9-CM: 799.89  7/5/2019 Yes        Acute pulmonary embolism ([facility] [address] ICD-10-CM: I26.99  ICD-9-CM: 415.19  7/1/2019 Yes    Overview Signed 7/9/2019  5:07 PM by Hermelinda Campos MD     Pulmonary emboli at the periphery of the right main pulmonary artery extending into the middle and lower lobe branches             Anticoagulated on warfarin (Chronic) ICD-10-CM: Z79.01  ICD-9-CM: V58.61  7/1/2019 Yes        * (Principal) Status post open reduction with internal fixation of fracture ICD-10-CM: Z96.7, Z87.81  ICD-9-CM: V45.89, V15.51  6/25/2019 Yes    Overview Signed 7/9/2019  4:52 PM by Sunday Hubbard MD     S/P Open reduction internal fixation of displaced subtrochanteric periprosthetic fracture around internal prosthetic left hip joint (6/25/2019 - Dr. Lori Robbins)             Acute blood loss as cause of postoperative anemia ICD-10-CM: D62  ICD-9-CM: 285.1  6/25/2019 Yes        Periprosthetic fracture around internal prosthetic left hip joint ([facility] [address] ICD-10-CM: M97. 02XA  ICD-9-CM: 996.44, V43.64  6/24/2019 Yes    Overview Signed 7/9/2019  5:04 PM by Sunday Hubbard MD     S/P Open reduction internal fixation of displaced subtrochanteric periprosthetic fracture around internal prosthetic left hip joint (6/25/2019 - Dr. Lori Robbins)             Displaced subtrochanteric fracture of left femur [facility]) ICD-10-CM: Z00.29QK  ICD-9-CM: 820.22  6/24/2019 Yes    Overview Signed 7/9/2019  5:02 PM by Sunday Hubbard MD     S/P Open reduction internal fixation of displaced subtrochanteric periprosthetic fracture around internal prosthetic left hip joint (6/25/2019 - Dr. Lori Robbins)             History of total left hip arthroplasty ICD-10-CM: K00.329  ICD-9-CM: V43.64  2/18/2019 Yes    Overview Addendum 7/9/2019  5:01 PM by Sunday Hubbard MD     S/P Left total hip replacement (2/18/2019 - Dr. Lori Robbins)                   Background:   Past Medical History:   Past Medical History:   Diagnosis Date    Acute blood loss as cause of postoperative anemia 6/25/2019    Acute pulmonary embolism ([facility] [address] 7/1/2019    Pulmonary emboli at the periphery of the right main pulmonary artery extending into the middle and lower lobe branches    Anticoagulated on warfarin 7/1/2019    Benign prostatic hyperplasia with lower urinary tract symptoms     Demand ischemia of myocardium ([address] 7/6/2019 4/16/18 NST EF 59% , small, reversible apical defect 7/2/19 ECHO EF 68%, LVH 14/16, LAE, RVE, ANDREY 1.4cm2 with 12mm mean gradient    Diverticulosis     Dyslipidemia     Essential tremor     Gastroesophageal reflux disease     History of pericarditis     Obesity, Class I, BMI 30-34.9     Paroxysmal atrial fibrillation ([address]     CVAL Maddy Saleh FNP 2008  AF ablation 9/11/13 EPS with isolation of RSPV and LIPV 6/4/18 TEDDY  EF 55%, 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 male recently admitted for femur fracturesent from [facility] nursing [facility] for fever to 102.9. Patient was recently seen at [facility] for hip replacement status post fall. Patient endorses retrosternal chest pain that started upon waking this morning has been persistent since without propagating or palliating factors.   Patient also endorses increased bilateral lower extremity s    Sick sinus syndrome ([address]     Vitamin D deficiency 7/10/2019    Vitamin D 25-Hydroxy (7/10/2019) = 18.6       Patient taking anticoagulants yes Coumadin   Patient has a defibrillator: no     Assessment:   Changes in Assessment throughout shift: no     Patient has central line: no Reasons if yes: na  Insertion date:na Last dressing date:na   Patient has Truong Cath: no Reasons if yes: n/a   Insertion date:n/a     Last Vitals:     Vitals:    07/11/19 2100 07/12/19 0743 07/12/19 1633 07/12/19 2153   BP: 131/68 115/75 133/72 152/72   Pulse: 66 81 72 77   Resp: 18 18 19 18   Temp: 97.7 °F (36.5 °C) 99.3 °F (37.4 °C) 98.5 °F (36.9 °C) 97.1 °F (36.2 °C)   SpO2: 97% 96% 99% 100%   Weight:       Height:            PAIN    Pain Assessment    Pain Intensity 1: 7 (07/13/19 0753) Pain Intensity 1: 2 (12/29/14 1105)    Pain Location 1: Hip Pain Location 1: Abdomen    Pain Intervention(s) 1: Medication (see MAR) Pain Intervention(s) 1: Medication (see MAR)  Patient Stated Pain Goal: 0 Patient Stated Pain Goal: 0  o Intervention effective: yes    o Other actions taken for pain: no     Skin Assessment  Skin color Skin Color: Appropriate for ethnicity  Condition/Temperature Skin Condition/Temp: Dry, Warm  Integrity Skin Integrity: Incision (comment)  Turgor Turgor: Non-tenting  Weekly Pressure Ulcer Documentation  Pressure  Injury Documentation: No Pressure Injury Noted-Pressure Ulcer Prevention Initiated  Wound Prevention & Protection Methods  Orientation of wound Orientation of Wound Prevention: Posterior  Location of Prevention Location of Wound Prevention: Sacrum/Coccyx  Dressing Present Dressing Present : No  Dressing Status    Wound Offloading Wound Offloading (Prevention Methods): Bed, pressure redistribution/air, Repositioning     INTAKE/OUPUT  Date 07/12/19 0700 - 07/13/19 0659 07/13/19 0700 - 07/14/19 0659   Shift 7765-9823 3512-5946 24 Hour Total 8468-3847 5280-6120 24 Hour Total   INTAKE   Shift Total(mL/kg)         OUTPUT   Urine(mL/kg/hr)  1925(1.6) 1925(0.8)        Urine Voided  1925 1925        Urine Occurrence(s) 2 x  2 x      Emesis/NG output  0 0        Emesis  0 0      Stool  0 0        Stool Occurrence(s) 0 x  0 x        Stool  0 0      Shift Total(mL/kg)  1925(18.6) 1925(18.6)      NET  -1925 -1925      Weight (kg) 103.4 103.4 103.4 103.4 103.4 103.4       Recommendations:  1. Patient needs and requests: addressed    2. Diet: cardiac    3. Pending tests/procedures: none     4. Functional Level/Equipment: whellchair    5. Estimated Discharge Date: TBD Posted on Whiteboard in Patients Room: no     HEALS Safety Check    A safety check occurred in the patient's room between off going nurse and oncoming nurse listed above.     The safety check included the below items  Area Items   H  High Alert Medications - Verify all high alert medication drips (heparin, PCA, etc.)   E  Equipment - Suction is set up for ALL patients (with presley)  - Red plugs utilized for all equipment (IV pumps, etc.)  - WOWs wiped down at end of shift.  - Room stocked with oxygen, suction, and other unit-specific supplies   A  Alarms - Bed alarm is set for fall risk patients  - Ensure chair alarm is in place and activated if patient is up in a chair   L  Lines - Check IV for any infiltration  - Truong bag is empty if patient has a Truong   - Tubing and IV bags are labeled   S  Safety   - Room is clean, patient is clean, and equipment is clean. - Hallways are clear from equipment besides carts. - Fall bracelet on for fall risk patients  - Ensure room is clear and free of clutter  - Suction is set up for ALL patients (with presley)  - Hallways are clear from equipment besides carts.    - Isolation precautions followed, supplies available outside room, sign posted

## 2019-07-14 LAB
INR PPP: 2.6 (ref 0.8–1.2)
PROTHROMBIN TIME: 28.1 SEC (ref 11.5–15.2)

## 2019-07-14 PROCEDURE — 65310000000 HC RM PRIVATE REHAB

## 2019-07-14 PROCEDURE — 36415 COLL VENOUS BLD VENIPUNCTURE: CPT

## 2019-07-14 PROCEDURE — 74011250637 HC RX REV CODE- 250/637: Performed by: INTERNAL MEDICINE

## 2019-07-14 PROCEDURE — 85610 PROTHROMBIN TIME: CPT

## 2019-07-14 PROCEDURE — 74011250637 HC RX REV CODE- 250/637: Performed by: FAMILY MEDICINE

## 2019-07-14 PROCEDURE — 97110 THERAPEUTIC EXERCISES: CPT

## 2019-07-14 PROCEDURE — 97530 THERAPEUTIC ACTIVITIES: CPT

## 2019-07-14 RX ORDER — WARFARIN 2.5 MG/1
5 TABLET ORAL ONCE
Status: COMPLETED | OUTPATIENT
Start: 2019-07-14 | End: 2019-07-14

## 2019-07-14 RX ADMIN — HYDROCODONE BITARTRATE AND ACETAMINOPHEN 1 TABLET: 5; 325 TABLET ORAL at 08:00

## 2019-07-14 RX ADMIN — Medication 250 MG: at 08:45

## 2019-07-14 RX ADMIN — CALCIUM CITRATE 200 MG (950 MG) TABLET 200 MG: at 08:44

## 2019-07-14 RX ADMIN — SIMVASTATIN 10 MG: 10 TABLET, FILM COATED ORAL at 21:30

## 2019-07-14 RX ADMIN — CALCIUM CITRATE 200 MG (950 MG) TABLET 200 MG: at 17:00

## 2019-07-14 RX ADMIN — FERROUS SULFATE TAB 325 MG (65 MG ELEMENTAL FE) 325 MG: 325 (65 FE) TAB at 08:44

## 2019-07-14 RX ADMIN — WARFARIN SODIUM 5 MG: 2.5 TABLET ORAL at 17:01

## 2019-07-14 RX ADMIN — HYDROCODONE BITARTRATE AND ACETAMINOPHEN 1 TABLET: 5; 325 TABLET ORAL at 21:30

## 2019-07-14 RX ADMIN — HYDROCODONE BITARTRATE AND ACETAMINOPHEN 1 TABLET: 5; 325 TABLET ORAL at 12:26

## 2019-07-14 RX ADMIN — HYDROCODONE BITARTRATE AND ACETAMINOPHEN 1 TABLET: 5; 325 TABLET ORAL at 17:00

## 2019-07-14 RX ADMIN — METHOCARBAMOL TABLETS 500 MG: 500 TABLET, COATED ORAL at 14:23

## 2019-07-14 RX ADMIN — METHOCARBAMOL TABLETS 500 MG: 500 TABLET, COATED ORAL at 21:30

## 2019-07-14 RX ADMIN — Medication 5000 UNITS: at 08:44

## 2019-07-14 RX ADMIN — METHOCARBAMOL TABLETS 500 MG: 500 TABLET, COATED ORAL at 06:18

## 2019-07-14 RX ADMIN — ACETAMINOPHEN 650 MG: 325 TABLET ORAL at 18:24

## 2019-07-14 NOTE — PROGRESS NOTES
conducted a Follow up consultation and Spiritual Assessment for Josiane Ludwig, who is a 80 y.o.,male. The  provided the following Interventions:  Continued the relationship of care and support. Listened empathically. Offered assurance of continued prayer on patients behalf. Chart reviewed. The following outcomes were achieved:  Patient expressed gratitude for 's visit. Assessment:  There are no further spiritual or Oriental orthodox issues which require Spiritual Care Services interventions at this time. Plan:  Chaplains will continue to follow and will provide pastoral care on an as needed/requested basis.  recommends bedside caregivers page  on duty if patient shows signs of acute spiritual or emotional distress.        [id number]   [phone]

## 2019-07-14 NOTE — PROGRESS NOTES
Progress Note    Patient: Sharon Sanabria MRN: [id number]  CSN: [id number]    YOB: 1934  Age: 80 y.o. Sex: male    DOA: 7/9/2019 LOS:  LOS: 5 days                    Subjective:       Primary Rehab Diagnosis: Impaired Mobility and ADLs secondary to:  1. S/P Open reduction internal fixation of displaced subtrochanteric periprosthetic fracture around internal prosthetic left hip joint (6/25/2019 - Dr. Lashae Hurley)  2. History of displased subtrochanteric periprosthetic fracture of left femur around internal prosthetic left hip joint; S/P Left total hip replacement (2/18/2019 - Dr. Lashae Hurley)          Review of systems  General: No fevers or chills. Cardiovascular: No chest pain or pressure. No palpitations. Pulmonary: No shortness of breath, cough or wheeze. Gastrointestinal: No abdominal pain, nausea, vomiting or diarrhea. Had BM yesterday  Genitourinary: No dysuria. Musculoskeletal: Lt hip pain and spasm on and off improved with pain med    Neurologic: No headache, generalized weakness    Objective:     Physical Exam:  Visit Vitals  /64 (BP 1 Location: Left arm, BP Patient Position: At rest;Supine)   Pulse 67   Temp 97.6 °F (36.4 °C)   Resp 18   Ht 6' 2\" (1.88 m)   Wt 103.4 kg (228 lb)   SpO2 96%   BMI 29.27 kg/m²        General:         Alert,no acute distress    HEENT: NC, Atraumatic. anicteric sclerae. Lungs: CTA Bilaterally. No Wheezing/Rhonchi/Rales. Heart:  Regular  Rhythm, soft systolic murmer  Abdomen: Soft, Non distended, Non tender.    Extremities: Trace lower limb edema incision Lt hip clean   Psych:    Not anxious or agitated. Neurologic:  CN 2-12 grossly intact, Alert and oriented X 3. No acute neurological                                 Deficits,     Intake and Output:  Current Shift:  No intake/output data recorded.   Last three shifts:  07/12 1901 - 07/14 0700  In: -   Out: 1925 [Urine:1925]    Labs: Results:       Chemistry No results for input(s): GLU, NA, K, CL, CO2, BUN, CREA, CA, AGAP, BUCR, TBIL, GPT, AP, TP, ALB, GLOB, AGRAT in the last 72 hours. CBC w/Diff No results for input(s): WBC, RBC, HGB, HCT, PLT, GRANS, LYMPH, EOS, HGBEXT, HCTEXT, PLTEXT, HGBEXT, HCTEXT, PLTEXT in the last 72 hours. Cardiac Enzymes No results for input(s): CPK, CKND1, APRIL in the last 72 hours. No lab exists for component: CKRMB, TROIP   Coagulation Recent Labs     07/14/19  0646 07/13/19  0703   PTP 28.1* 27.3*   INR 2.6* 2.5*       Lipid Panel No results found for: CHOL, CHOLPOCT, CHOLX, CHLST, CHOLV, [id number], HDL, LDL, LDLC, DLDLP, 471008, VLDLC, VLDL, TGLX, TRIGL, TRIGP, TGLPOCT, CHHD, CHHDX   BNP No results for input(s): BNPP in the last 72 hours. Liver Enzymes No results for input(s): TP, ALB, TBIL, AP, SGOT, GPT in the last 72 hours.     No lab exists for component: DBIL   Thyroid Studies No results found for: T4, T3U, TSH, TSHEXT, TSHEXT       Procedures/imaging: see electronic medical records for all procedures/Xrays and details which were not copied into this note but were reviewed prior to creation of Plan    Medications:   Current Facility-Administered Medications   Medication Dose Route Frequency    warfarin (COUMADIN) tablet 5 mg  5 mg Oral ONCE    cholecalciferol (VITAMIN D3) capsule 5,000 Units  5,000 Units Oral DAILY    calcium citrate tablet 200 mg [elemental]  200 mg Oral BID    warfarin - physician to dose   Other Q24H    ferrous sulfate tablet 325 mg  1 Tab Oral DAILY WITH BREAKFAST    ascorbic acid (vitamin C) (VITAMIN C) tablet 250 mg  250 mg Oral DAILY WITH BREAKFAST    methocarbamol (ROBAXIN) tablet 500 mg  500 mg Oral Q8H    acetaminophen (TYLENOL) tablet 650 mg  650 mg Oral Q4H PRN    bisacodyl (DULCOLAX) tablet 10 mg  10 mg Oral Q48H PRN    HYDROcodone-acetaminophen (NORCO) 5-325 mg per tablet 1 Tab  1 Tab Oral Q4H PRN    simvastatin (ZOCOR) tablet 10 mg  10 mg Oral QHS       Assessment/Plan     Principal Problem:    Status post open reduction with internal fixation of fracture (6/25/2019)      Overview: S/P Open reduction internal fixation of displaced subtrochanteric       periprosthetic fracture around internal prosthetic left hip joint       (6/25/2019 - Dr. Erwin New)    Active Problems:    Sepsis due to Pseudomonas species ([address] (7/5/2019)      Overview: 7/5/19                 History of total left hip arthroplasty (2/18/2019)      Overview: S/P Left total hip replacement (2/18/2019 - Dr. Erwin New)      Periprosthetic fracture around internal prosthetic left hip joint ([address] (6/24/2019)      Overview: S/P Open reduction internal fixation of displaced subtrochanteric       periprosthetic fracture around internal prosthetic left hip joint       (6/25/2019 - Dr. Erwin New)      Displaced subtrochanteric fracture of left femur ([address] (6/24/2019)      Overview: S/P Open reduction internal fixation of displaced subtrochanteric       periprosthetic fracture around internal prosthetic left hip joint       (6/25/2019 - Dr. Erwin New)      Impaired mobility and ADLs (7/5/2019)      Acute blood loss as cause of postoperative anemia (6/25/2019)      Acute pulmonary embolism ([address] (7/1/2019)      Overview: Pulmonary emboli at the periphery of the right main pulmonary artery       extending into the middle and lower lobe branches      Anticoagulated on warfarin (7/1/2019)      Vitamin D deficiency (7/10/2019)      Overview: Vitamin D 25-Hydroxy (7/10/2019) = 18.6     S/P Open reduction internal fixation of displaced subtrochanteric periprosthetic fracture around internal prosthetic left hip joint (6/25/2019 - Dr. Erwin New);  History of displaced subtrochanteric periprosthetic fracture of left femur around internal prosthetic left hip joint; S/P Left total hip replacement (2/18/2019 - Dr. Erwin eNw)  Nonweightbearing on the left lower extremity   Total hip precautions on left hip  Staples were removed on 7/9/2019    Calcium citrate 1 tab PO BID        Acute Postoperative Blood Loss Anemia  Ferrous sulfate  and Ascorbic acid                 Acute pulmonary embolism, anticoagulated on Warfarin   INR  2.6 Pt received  4 mg  Last night  Pt off cipro expecting pt will need higher dose of coumadin will give coumadin 5 mg X 1 and recheck INR In AM      Sepsis due to Pseudomonas aeruginosa    Cipro  q 12 hr x 4 days (STOP DATE: 7/13/2019)      Vitamin D Deficiency  Continue Cholecalciferol 5,000 units PO once daily         Fidencio Mosley MD  7/14/2019 1:37 PM

## 2019-07-14 NOTE — PROGRESS NOTES
SHIFT CHANGE NOTE FOR [facility]    Bedside and Verbal shift change report given to Jesus Kang RN (oncoming nurse) by Beverly Soriano RN (offgoing nurse). Report included the following information SBAR, Kardex, MAR and Recent Results. Situation:   Code Status: Full Code   Reason for Admission: S/P ORIF  Hospital Day: 5   Problem List:   Hospital Problems  Date Reviewed: 7/12/2019          Codes Class Noted POA    Vitamin D deficiency (Chronic) ICD-10-CM: E55.9  ICD-9-CM: 268.9  7/10/2019 Yes    Overview Signed 7/10/2019 11:23 AM by Jen Calloway MD     Vitamin D 25-Hydroxy (7/10/2019) = 18.6              Sepsis due to Pseudomonas species [facility]) ICD-10-CM: A41.52  ICD-9-CM: 038.43, 995.91  7/5/2019 Yes    Overview Addendum 7/6/2019  8:16 PM by Ludy Ojeda MD     7/5/19      80 y.o. male recently admitted for femur fracturesent from [facility] nursing [facility] for fever to 102.9. Patient was recently seen at [facility] for hip replacement status post fall. Patient endorses retrosternal chest pain that started upon waking this morning has been persistent since without propagating or palliating factors. Patient also endorses increased bilateral lower extremity swelling and difficulty breathing. WBC 29.2  P-87%,  U/A neg  CT  Heterogeneous collection posterior to the greater trochanter, most consistent with a postoperative hematoma. No rim enhancement to suggest abscess  CXR Small bilateral pleural effusions, left greater than right. Mild pulmonary edema.  No consolidation  7/6/19 ORTHO (Charlett Gemma) Benign appearing left hip and femur now 11 days s/p ORIF periprosthetic hip fracture                         Impaired mobility and ADLs ICD-10-CM: Z74.09  ICD-9-CM: 799.89  7/5/2019 Yes        Acute pulmonary embolism ([facility] [address] ICD-10-CM: I26.99  ICD-9-CM: 415.19  7/1/2019 Yes    Overview Signed 7/9/2019  5:07 PM by Jen Calloway MD     Pulmonary emboli at the periphery of the right main pulmonary artery extending into the middle and lower lobe branches             Anticoagulated on warfarin (Chronic) ICD-10-CM: Z79.01  ICD-9-CM: V58.61  7/1/2019 Yes        * (Principal) Status post open reduction with internal fixation of fracture ICD-10-CM: Z96.7, Z87.81  ICD-9-CM: V45.89, V15.51  6/25/2019 Yes    Overview Signed 7/9/2019  4:52 PM by Sherry North MD     S/P Open reduction internal fixation of displaced subtrochanteric periprosthetic fracture around internal prosthetic left hip joint (6/25/2019 - Dr. Cristal Shelby)             Acute blood loss as cause of postoperative anemia ICD-10-CM: D62  ICD-9-CM: 285.1  6/25/2019 Yes        Periprosthetic fracture around internal prosthetic left hip joint ([facility] [address] ICD-10-CM: M97. 02XA  ICD-9-CM: 996.44, V43.64  6/24/2019 Yes    Overview Signed 7/9/2019  5:04 PM by Sherry North MD     S/P Open reduction internal fixation of displaced subtrochanteric periprosthetic fracture around internal prosthetic left hip joint (6/25/2019 - Dr. Cristal Shelby)             Displaced subtrochanteric fracture of left femur [facility]) ICD-10-CM: V25.22HD  ICD-9-CM: 820.22  6/24/2019 Yes    Overview Signed 7/9/2019  5:02 PM by Sherry North MD     S/P Open reduction internal fixation of displaced subtrochanteric periprosthetic fracture around internal prosthetic left hip joint (6/25/2019 - Dr. Cristal Shelby)             History of total left hip arthroplasty ICD-10-CM: U03.506  ICD-9-CM: V43.64  2/18/2019 Yes    Overview Addendum 7/9/2019  5:01 PM by Sherry North MD     S/P Left total hip replacement (2/18/2019 - Dr. Cristal Shelby)                   Background:   Past Medical History:   Past Medical History:   Diagnosis Date    Acute blood loss as cause of postoperative anemia 6/25/2019    Acute pulmonary embolism ([facility] [address] 7/1/2019    Pulmonary emboli at the periphery of the right main pulmonary artery extending into the middle and lower lobe branches    Anticoagulated on warfarin 7/1/2019    Benign prostatic hyperplasia with lower urinary tract symptoms     Demand ischemia of myocardium ([address] 7/6/2019 4/16/18 NST EF 59% , small, reversible apical defect 7/2/19 ECHO EF 68%, LVH 14/16, LAE, RVE, ANDREY 1.4cm2 with 12mm mean gradient    Diverticulosis     Dyslipidemia     Essential tremor     Gastroesophageal reflux disease     History of pericarditis     Obesity, Class I, BMI 30-34.9     Paroxysmal atrial fibrillation ([address]     CVAL Maddy Saleh FNP 2008  AF ablation 9/11/13 EPS with isolation of RSPV and LIPV 6/4/18 TEDDY  EF 55%, 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 male recently admitted for femur fracturesent from [facility] nursing [facility] for fever to 102.9. Patient was recently seen at [facility] for hip replacement status post fall. Patient endorses retrosternal chest pain that started upon waking this morning has been persistent since without propagating or palliating factors.   Patient also endorses increased bilateral lower extremity s    Sick sinus syndrome ([address]     Vitamin D deficiency 7/10/2019    Vitamin D 25-Hydroxy (7/10/2019) = 18.6       Patient taking anticoagulants yes Coumadin   Patient has a defibrillator: no     Assessment:   Changes in Assessment throughout shift: no     Patient has central line: no Reasons if yes: na  Insertion date:na Last dressing date:na   Patient has Truong Cath: no Reasons if yes: n/a   Insertion date:n/a     Last Vitals:     Vitals:    07/13/19 1547 07/13/19 2139 07/14/19 0726 07/14/19 1537   BP: 133/72 142/77 128/64 127/64   Pulse: 77 74 67 69   Resp: 18 20 18 20   Temp: 98.6 °F (37 °C) 98.3 °F (36.8 °C) 97.6 °F (36.4 °C) 97.4 °F (36.3 °C)   SpO2: 97% 97% 96% 98%   Weight:       Height:            PAIN    Pain Assessment    Pain Intensity 1: 6 (07/14/19 1919) Pain Intensity 1: 2 (12/29/14 1105)    Pain Location 1: Leg Pain Location 1: Abdomen    Pain Intervention(s) 1: Medication (see MAR), Ice, Repositioned Pain Intervention(s) 1: Medication (see MAR)  Patient Stated Pain Goal: 0 Patient Stated Pain Goal: 0  o Intervention effective: yes    o Other actions taken for pain: no     Skin Assessment  Skin color Skin Color: Appropriate for ethnicity  Condition/Temperature Skin Condition/Temp: Warm, Dry  Integrity Skin Integrity: Incision (comment)  Turgor Turgor: Non-tenting  Weekly Pressure Ulcer Documentation  Pressure  Injury Documentation: No Pressure Injury Noted-Pressure Ulcer Prevention Initiated  Wound Prevention & Protection Methods  Orientation of wound Orientation of Wound Prevention: Posterior  Location of Prevention Location of Wound Prevention: Buttocks, Sacrum/Coccyx  Dressing Present Dressing Present : No  Dressing Status    Wound Offloading Wound Offloading (Prevention Methods): Adaptive equipment, Bed, pressure reduction mattress, Chair cushion, Elevate heels, Pillows, Repositioning, Turning, Wheelchair     INTAKE/OUPUT  Date 07/13/19 1900 - 07/14/19 0659 07/14/19 0700 - 07/15/19 0659   Shift 0842-2813 24 Hour Total 7391-7105 6463-5707 24 Hour Total   INTAKE   P.O.   1520  1520     P. O.   1520  1520   Shift Total(mL/kg)   1520(14.7)  1520(14.7)   OUTPUT   Urine(mL/kg/hr)          Urine Occurrence(s) 3 x 4 x 4 x  4 x   Stool          Stool Occurrence(s) 0 x 0 x 1 x  1 x   Shift Total(mL/kg)        NET   1520  1520   Weight (kg) 103.4 103.4 103.4 103.4 103.4       Recommendations:  1. Patient needs and requests: addressed    2. Diet: cardiac    3. Pending tests/procedures: none     4. Functional Level/Equipment: whellchair    5. Estimated Discharge Date: TBD Posted on Whiteboard in Patients Room: no     [facility] Safety Check    A safety check occurred in the patient's room between off going nurse and oncoming nurse listed above.     The safety check included the below items  Area Items   H  High Alert Medications - Verify all high alert medication drips (heparin, PCA, etc.)   E  Equipment - Suction is set up for ALL patients (with presley)  - Red plugs utilized for all equipment (IV pumps, etc.)  - WOWs wiped down at end of shift.  - Room stocked with oxygen, suction, and other unit-specific supplies   A  Alarms - Bed alarm is set for fall risk patients  - Ensure chair alarm is in place and activated if patient is up in a chair   L  Lines - Check IV for any infiltration  - Truong bag is empty if patient has a Truong   - Tubing and IV bags are labeled   S  Safety   - Room is clean, patient is clean, and equipment is clean. - Hallways are clear from equipment besides carts. - Fall bracelet on for fall risk patients  - Ensure room is clear and free of clutter  - Suction is set up for ALL patients (with presley)  - Hallways are clear from equipment besides carts.    - Isolation precautions followed, supplies available outside room, sign posted

## 2019-07-14 NOTE — PROGRESS NOTES
Problem: Self Care Deficits Care Plan (Adult)  Goal: *Therapy Goal (Edit Goal, Insert Text)  Description  Occupational Therapy Goals   Long Term Goals  Initiated 7/10/19 and to be accomplished within 2 week(s) 19  1. Pt will perform self-feeding with I.  2. Pt will perform grooming with S.  3. Pt will perform UB bathing with S.  4. Pt will perform LB bathing with S.  5. Pt will perform tub/shower transfer with S.   6. Pt will perform UB dressing with S.  7. Pt will perform LB dressing with S.  8. Pt will perform toileting task with [facility]. 9. Pt will perform toilet transfer with S.    Short Term Goals   Initiated 7/10/19 and to be accomplished within 7 day(s)  19  1. Pt will perform self-feeding with S.   2. Pt will perform grooming with S.  3. Pt will perform UB bathing with S.  4. Pt will perform LB bathing with [place]. 5. Pt will perform tub/shower transfer with [facility]. 6. Pt will perform UB dressing with S.  7. Pt will perform LB dressing with [place]. 8. Pt will perform toileting task with [place]. 9. Pt will perform toilet transfer with [facility]. Outcome: Progressing Towards Goal   OCCUPATIONAL THERAPY TREATMENT    Patient: Tommy Lowery.   80 y.o. Patient identified with name and : Yes    Date: 2019    First Tx Session  Time In: 80  Time Out[de-identified] 1200      Diagnosis: Status post open reduction with internal fixation of fracture [Z96.7, Z87.81]   Precautions:  NWB on left   Chart, occupational therapy assessment, plan of care, and goals were reviewed. Pain:  Pt reports 6/10 pain or discomfort prior to treatment. Pt reports 7/10 pain or discomfort post treatment. Intervention Provided: N/A      SUBJECTIVE:   Patient stated I am scared during standing task. OBJECTIVE DATA SUMMARY:     THERAPEUTIC ACTIVITY Daily Assessment    Min Peg (card 5-23). Pt initiated task in stand position up to ~8 minutes until reporting dizziness.   Pt required vcs to hold on to [facility] for stability and not table during task. Pt vitals taken and WNL. Pt complete task seated with 2lb weight at both wrists. Noted essential tremors Pt not open to compensatory strategies. THERAPEUTIC EXERCISE Daily Assessment    Ball toss with #4 therabar seated 2x10, 1x10. Thereaflexbar (green) 3x15 (supination/pronation). Pt required vcs for form. MOBILITY/TRANSFERS Daily Assessment     Recliner <---> w/c with S.  Pt performed stand to FWW to w/c using SPT. Pt I'ly propelled self to gym from room. ASSESSMENT:  Pt showing increased BUE strength but progressed slowly with standing tolerance secondary to dizziness today. Pt to continue with maximizing I in ADLs. Progression toward goals:  ?x          Improving appropriately and progressing toward goals  ? Improving slowly and progressing toward goals  ? Not making progress toward goals and plan of care will be adjusted     PLAN:  Patient continues to benefit from skilled intervention to address the above impairments. Continue treatment per established plan of care. Discharge Recommendations:  Home Health  Further Equipment Recommendations for Discharge:  3 in 1 bedside commode      Activity Tolerance:  Fair      Estimated LOS:7/19/19    Please refer to the flowsheet for vital signs taken during this treatment. After treatment:   ?x  Patient left in no apparent distress sitting up in chair   ? Patient left in no apparent distress in bed  ? Call bell left within reach  ? Nursing notified  ? Caregiver present  ? Bed alarm activated    COMMUNICATION/EDUCATION:   ? Home safety education was provided and the patient/caregiver indicated understanding. ?x Patient/family have participated as able in goal setting and plan of care. ? Patient/family agree to work toward stated goals and plan of care. ? Patient understands intent and goals of therapy, but is neutral about his/her participation. ?  Patient is unable to participate in goal setting and plan of care.       Ratna Roe, OT

## 2019-07-14 NOTE — PROGRESS NOTES
SHIFT CHANGE NOTE FOR [facility]    Bedside and Verbal shift change report given to Michaelle Blount, RN (oncoming nurse) by Barbera Gottron, RN (offgoing nurse). Report included the following information SBAR, Kardex, MAR and Recent Results. Situation:   Code Status: Full Code   Reason for Admission: S/P ORIF  Hospital Day: 5   Problem List:   Hospital Problems  Date Reviewed: 2019          Codes Class Noted POA    Vitamin D deficiency (Chronic) ICD-10-CM: E55.9  ICD-9-CM: 268.9  7/10/2019 Yes    Overview Signed 7/10/2019 11:23 AM by Cal Hashimoto, MD     Vitamin D 25-Hydroxy (7/10/2019) = 18.6              Sepsis due to Pseudomonas species [facility]) ICD-10-CM: A41.52  ICD-9-CM: 038.43, 995.91  2019 Yes    Overview Addendum 2019  8:16 PM by Harleen Newby MD     19      80 y.o. male recently admitted for femur fracturesent from [facility] nursing [facility] for fever to 102.9. Patient was recently seen at [facility] for hip replacement status post fall. Patient endorses retrosternal chest pain that started upon waking this morning has been persistent since without propagating or palliating factors. Patient also endorses increased bilateral lower extremity swelling and difficulty breathing. WBC 29.2  P-87%,  U/A neg  CT  Heterogeneous collection posterior to the greater trochanter, most consistent with a postoperative hematoma. No rim enhancement to suggest abscess  CXR Small bilateral pleural effusions, left greater than right. Mild pulmonary edema.  No consolidation  19 ORTHO ([facility]) Benign appearing left hip and femur now 11 days s/p ORIF periprosthetic hip fracture                         Impaired mobility and ADLs ICD-10-CM: Z74.09  ICD-9-CM: 799.89  2019 Yes        Acute pulmonary embolism ([facility] [address] ICD-10-CM: I26.99  ICD-9-CM: 415.19  2019 Yes    Overview Signed 2019  5:07 PM by Cal Hashimoto, MD     Pulmonary emboli at the periphery of the right main pulmonary artery extending into the middle and lower lobe branches             Anticoagulated on warfarin (Chronic) ICD-10-CM: Z79.01  ICD-9-CM: V58.61  7/1/2019 Yes        * (Principal) Status post open reduction with internal fixation of fracture ICD-10-CM: Z96.7, Z87.81  ICD-9-CM: V45.89, V15.51  6/25/2019 Yes    Overview Signed 7/9/2019  4:52 PM by Cal Hashimoto, MD     S/P Open reduction internal fixation of displaced subtrochanteric periprosthetic fracture around internal prosthetic left hip joint (6/25/2019 - Dr. Kristy Fletcher)             Acute blood loss as cause of postoperative anemia ICD-10-CM: D62  ICD-9-CM: 285.1  6/25/2019 Yes        Periprosthetic fracture around internal prosthetic left hip joint ([facility] [address] ICD-10-CM: M97. 02XA  ICD-9-CM: 996.44, V43.64  6/24/2019 Yes    Overview Signed 7/9/2019  5:04 PM by Cal Hashimoto, MD     S/P Open reduction internal fixation of displaced subtrochanteric periprosthetic fracture around internal prosthetic left hip joint (6/25/2019 - Dr. Kristy Fletcher)             Displaced subtrochanteric fracture of left femur [facility]) ICD-10-CM: B60.63ZL  ICD-9-CM: 820.22  6/24/2019 Yes    Overview Signed 7/9/2019  5:02 PM by Cal Hashimoto, MD     S/P Open reduction internal fixation of displaced subtrochanteric periprosthetic fracture around internal prosthetic left hip joint (6/25/2019 - Dr. Kristy Fletcher)             History of total left hip arthroplasty ICD-10-CM: Z86.926  ICD-9-CM: V43.64  2/18/2019 Yes    Overview Addendum 7/9/2019  5:01 PM by Cal Hashimoto, MD     S/P Left total hip replacement (2/18/2019 - Dr. Kristy Fletcher)                   Background:   Past Medical History:   Past Medical History:   Diagnosis Date    Acute blood loss as cause of postoperative anemia 6/25/2019    Acute pulmonary embolism ([facility] [address] 7/1/2019    Pulmonary emboli at the periphery of the right main pulmonary artery extending into the middle and lower lobe branches    Anticoagulated on warfarin 7/1/2019    Benign prostatic hyperplasia with lower urinary tract symptoms     Demand ischemia of myocardium ([facility] [address] 7/6/2019 4/16/18 NST EF 59% , small, reversible apical defect 7/2/19 ECHO EF 68%, LVH 14/16, LAE, RVE, ANDREY 1.4cm2 with 12mm mean gradient    Diverticulosis     Dyslipidemia     Essential tremor     Gastroesophageal reflux disease     History of pericarditis     Obesity, Class I, BMI 30-34.9     Paroxysmal atrial fibrillation ([facility] [address]     CVAL Maddy Saleh FNP 2008  AF ablation 9/11/13 EPS with isolation of RSPV and LIPV 6/4/18 TEDDY  EF 55%, 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 male recently admitted for femur fracturesent from [facility] nursing [facility] for fever to 102.9. Patient was recently seen at [facility] for hip replacement status post fall. Patient endorses retrosternal chest pain that started upon waking this morning has been persistent since without propagating or palliating factors.   Patient also endorses increased bilateral lower extremity s    Sick sinus syndrome ([facility] [address]     Vitamin D deficiency 7/10/2019    Vitamin D 25-Hydroxy (7/10/2019) = 18.6       Patient taking anticoagulants yes Coumadin   Patient has a defibrillator: no     Assessment:   Changes in Assessment throughout shift: no     Patient has central line: no Reasons if yes: na  Insertion date:na Last dressing date:na   Patient has Truong Cath: no Reasons if yes: n/a   Insertion date:n/a     Last Vitals:     Vitals:    07/12/19 2153 07/13/19 0757 07/13/19 1547 07/13/19 2139   BP: 152/72 124/74 133/72 142/77   Pulse: 77 86 77 74   Resp: 18 18 18 20   Temp: 97.1 °F (36.2 °C) 97 °F (36.1 °C) 98.6 °F (37 °C) 98.3 °F (36.8 °C)   SpO2: 100% 96% 97% 97%   Weight:       Height:            PAIN    Pain Assessment    Pain Intensity 1: 0 (07/14/19 0453) Pain Intensity 1: 2 (12/29/14 0796)    Pain Location 1: Leg Pain Location 1: Abdomen    Pain Intervention(s) 1: Medication (see MAR), Ice Pain Intervention(s) 1: Medication (see MAR)  Patient Stated Pain Goal: 0 Patient Stated Pain Goal: 0  o Intervention effective: yes    o Other actions taken for pain: no     Skin Assessment  Skin color Skin Color: Appropriate for ethnicity  Condition/Temperature Skin Condition/Temp: Dry, Warm  Integrity Skin Integrity: Incision (comment)  Turgor Turgor: Non-tenting  Weekly Pressure Ulcer Documentation  Pressure  Injury Documentation: No Pressure Injury Noted-Pressure Ulcer Prevention Initiated  Wound Prevention & Protection Methods  Orientation of wound Orientation of Wound Prevention: Posterior  Location of Prevention Location of Wound Prevention: Buttocks, Sacrum/Coccyx  Dressing Present Dressing Present : No  Dressing Status    Wound Offloading Wound Offloading (Prevention Methods): Bed, pressure redistribution/air     INTAKE/OUPUT  Date 07/13/19 0700 - 07/14/19 0659 07/14/19 0700 - 07/15/19 0659   Shift 2615-7241 0072-6055 24 Hour Total 3533-2210 3590-4841 24 Hour Total   INTAKE   Shift Total(mL/kg)         OUTPUT   Urine(mL/kg/hr)           Urine Occurrence(s) 1 x  1 x      Stool           Stool Occurrence(s) 0 x  0 x      Shift Total(mL/kg)         NET         Weight (kg) 103.4 103.4 103.4 103.4 103.4 103.4       Recommendations:  1. Patient needs and requests: addressed    2. Diet: cardiac    3. Pending tests/procedures: none     4. Functional Level/Equipment: whellchair    5. Estimated Discharge Date: TBD Posted on Whiteboard in Patients Room: no     [facility] Safety Check    A safety check occurred in the patient's room between off going nurse and oncoming nurse listed above.     The safety check included the below items  Area Items   H  High Alert Medications - Verify all high alert medication drips (heparin, PCA, etc.)   E  Equipment - Suction is set up for ALL patients (with presley)  - Red plugs utilized for all equipment (IV pumps, etc.)  - WOWs wiped down at end of shift.  - Room stocked with oxygen, suction, and other unit-specific supplies   A  Alarms - Bed alarm is set for fall risk patients  - Ensure chair alarm is in place and activated if patient is up in a chair   L  Lines - Check IV for any infiltration  - Truong bag is empty if patient has a Truong   - Tubing and IV bags are labeled   S  Safety   - Room is clean, patient is clean, and equipment is clean. - Hallways are clear from equipment besides carts. - Fall bracelet on for fall risk patients  - Ensure room is clear and free of clutter  - Suction is set up for ALL patients (with yanker)  - Hallways are clear from equipment besides carts.    - Isolation precautions followed, supplies available outside room, sign posted

## 2019-07-15 LAB
ANION GAP SERPL CALC-SCNC: 5 MMOL/L (ref 3–18)
BUN SERPL-MCNC: 11 MG/DL (ref 7–18)
BUN/CREAT SERPL: 13 (ref 12–20)
CALCIUM SERPL-MCNC: 8.2 MG/DL (ref 8.5–10.1)
CHLORIDE SERPL-SCNC: 105 MMOL/L (ref 100–108)
CO2 SERPL-SCNC: 27 MMOL/L (ref 21–32)
CREAT SERPL-MCNC: 0.88 MG/DL (ref 0.6–1.3)
GLUCOSE SERPL-MCNC: 93 MG/DL (ref 74–99)
HCT VFR BLD AUTO: 29.5 % (ref 36–48)
HGB BLD-MCNC: 9.3 G/DL (ref 13–16)
INR PPP: 2.5 (ref 0.8–1.2)
POTASSIUM SERPL-SCNC: 4.2 MMOL/L (ref 3.5–5.5)
PROTHROMBIN TIME: 26.9 SEC (ref 11.5–15.2)
SODIUM SERPL-SCNC: 137 MMOL/L (ref 136–145)

## 2019-07-15 PROCEDURE — 97530 THERAPEUTIC ACTIVITIES: CPT

## 2019-07-15 PROCEDURE — 97542 WHEELCHAIR MNGMENT TRAINING: CPT

## 2019-07-15 PROCEDURE — 85610 PROTHROMBIN TIME: CPT

## 2019-07-15 PROCEDURE — 97110 THERAPEUTIC EXERCISES: CPT

## 2019-07-15 PROCEDURE — 65310000000 HC RM PRIVATE REHAB

## 2019-07-15 PROCEDURE — 85018 HEMOGLOBIN: CPT

## 2019-07-15 PROCEDURE — 80048 BASIC METABOLIC PNL TOTAL CA: CPT

## 2019-07-15 PROCEDURE — 36415 COLL VENOUS BLD VENIPUNCTURE: CPT

## 2019-07-15 PROCEDURE — 97535 SELF CARE MNGMENT TRAINING: CPT

## 2019-07-15 PROCEDURE — 74011250637 HC RX REV CODE- 250/637: Performed by: INTERNAL MEDICINE

## 2019-07-15 RX ORDER — WARFARIN 4 MG/1
4 TABLET ORAL ONCE
Status: COMPLETED | OUTPATIENT
Start: 2019-07-15 | End: 2019-07-15

## 2019-07-15 RX ORDER — DICLOFENAC EPOLAMINE 0.01 G/1
1 PATCH TOPICAL EVERY 12 HOURS
Status: DISCONTINUED | OUTPATIENT
Start: 2019-07-15 | End: 2019-07-23 | Stop reason: HOSPADM

## 2019-07-15 RX ADMIN — SIMVASTATIN 10 MG: 10 TABLET, FILM COATED ORAL at 21:23

## 2019-07-15 RX ADMIN — FERROUS SULFATE TAB 325 MG (65 MG ELEMENTAL FE) 325 MG: 325 (65 FE) TAB at 08:43

## 2019-07-15 RX ADMIN — HYDROCODONE BITARTRATE AND ACETAMINOPHEN 1 TABLET: 5; 325 TABLET ORAL at 21:22

## 2019-07-15 RX ADMIN — METHOCARBAMOL TABLETS 500 MG: 500 TABLET, COATED ORAL at 06:43

## 2019-07-15 RX ADMIN — Medication 5000 UNITS: at 08:43

## 2019-07-15 RX ADMIN — HYDROCODONE BITARTRATE AND ACETAMINOPHEN 1 TABLET: 5; 325 TABLET ORAL at 08:45

## 2019-07-15 RX ADMIN — METHOCARBAMOL TABLETS 500 MG: 500 TABLET, COATED ORAL at 13:47

## 2019-07-15 RX ADMIN — CALCIUM CITRATE 200 MG (950 MG) TABLET 200 MG: at 08:43

## 2019-07-15 RX ADMIN — Medication 250 MG: at 08:43

## 2019-07-15 RX ADMIN — HYDROCODONE BITARTRATE AND ACETAMINOPHEN 1 TABLET: 5; 325 TABLET ORAL at 17:47

## 2019-07-15 RX ADMIN — METHOCARBAMOL TABLETS 500 MG: 500 TABLET, COATED ORAL at 21:23

## 2019-07-15 RX ADMIN — WARFARIN SODIUM 4 MG: 4 TABLET ORAL at 17:47

## 2019-07-15 RX ADMIN — HYDROCODONE BITARTRATE AND ACETAMINOPHEN 1 TABLET: 5; 325 TABLET ORAL at 12:48

## 2019-07-15 RX ADMIN — CALCIUM CITRATE 200 MG (950 MG) TABLET 200 MG: at 17:47

## 2019-07-15 NOTE — PROGRESS NOTES
Problem: Mobility Impaired (Adult and Pediatric)  Goal: *Acute Goals and Plan of Care (Insert Text)  Description  Physical Therapy Goals  Short Term Goals  Initiated 7/10/2019 and to be accomplished within 7 day(s) (7/17/2019)  1. Patient will move from supine to sit and sit to supine  in bed with minimal assistance/contact guard assist.     2.  Patient will transfer from bed to chair and chair to bed with minimal assistance/contact guard assist using the least restrictive device. 3.  Patient will perform sit to stand with minimal assistance/contact guard assist.  4.  Patient will ambulate with moderate assistance  for 10 feet with the least restrictive device. Long Term Goals  Initiated 7/10/2019 and to be accomplished within 2-3 weeks (7/24/2019-7/31/2019)  1. Patient will move from supine to sit and sit to supine , scoot up and down and roll side to side in bed with modified independence. 2.  Patient will transfer from bed to chair and chair to bed with supervision/set-up using the least restrictive device. 3.  Patient will perform sit to stand with supervision/set-up. 4.  Patient will ambulate with supervision/set-up for 50 feet with RW maintaining NWB left LE. 5.  Patient will propel w/c over level surfaces greater than 150 feet and over entryways, thresholds, and ramps with modified independence. Outcome: Progressing Towards Goal   PHYSICAL THERAPY TREATMENT    Patient: Josiane Ludwig (80 y.o. male)  Date: 7/15/2019  Diagnosis: Status post open reduction with internal fixation of fracture [Z96.7, Z87.81] Status post open reduction with internal fixation of fracture  Precautions:  falls, NWB left LE  Chart, physical therapy assessment, plan of care and goals were reviewed. Time In: 7305  Time Out: 0905  Time In: 1030  Time Out: 1100  Time In: 1400  Time Out: 1435  Patient Seen For: Patient education; Therapeutic exercise;Transfer training; Wheelchair mobility  Pain:  Pt pain was reported as  6/10 pre-treatment. Pt pain was reported as 6/10 post-treatment. Intervention: Patient receiving pain medication from nursing staff. Patient identified with name and : yes    SUBJECTIVE:     Patient reporting feeling better today and that his pain has been under better control. Reports that he is willing to participate in therapy despite pain. PT educated patient and spouse Leif Ibrahim) that patient to not stand or attempt to transfer unless with staff assist until spouse provided appropriate training from therapy staff. OBJECTIVE DATA SUMMARY:   Objective:     TRANSFERS Daily Assessment    Transfer Assistance : 3 (Moderate assistance )(min/mod A for support while maintaining NWB LLE)  Sit to Stand Assistance: Minimal assistance    Therapist using Ped-Alert for auditory feedback regarding appropriate weightbearing status. Able to properly maintain NWB during turning/pivoting but needing assist from therapist during sit<->stand initially and then later when fatigued. Mod A for support as patient performing hop steps as part of transfers using RW and reminders occasionally that he is not able to rest left foot on ground mid-transfer as that would incur weightbearing through his left LE . Appropriately using bilat UE support of RW to perform small hop steps to maintain NWB during transfers.         GAIT Daily Assessment    Amount of Assistance: 0 (Not tested)       STEPS or STAIRS Daily Assessment    Level of Assist : 0 (Not tested)       BALANCE Daily Assessment    Sitting - Static: Good (unsupported)  Sitting - Dynamic: Good (unsupported)  Standing - Static: Poor(mod A for support and bilat UE support of RW)  Standing - Dynamic : Impaired       WHEELCHAIR MOBILITY Daily Assessment    Able to Propel (ft): 250 feet  Functional Level: 5  Curbs/Ramps Assist Required (FIM Score): 5 (Supervision)  Wheelchair Setup Assist Required : 3 (Moderate assistance)  Wheelchair Management: Manages left brake;Manages right brake(assist with legrests required)    Using bilat UE to propel chair. Therapist standing behind patient's chair during ramp negotiation for safety but patient able to negotiate wheelchair without additional physical assist required. Performed x 1 repetition. THERAPEUTIC EXERCISES Daily Assessment    Extremity: Both  Exercise Type #1: Seated lower extremity strengthening(left LE assisted knee extension)  Sets Performed: 3  Reps Performed: 10  Level of Assist: Minimal assistance  Exercise Type #2: Supine lower extremity strengthening(assisted hip/knee flex, SAQ using bolster)  Sets Performed: 3  Reps Performed: (30 on right LE 3# cuff weight, 10 reps LLE no weight)  Level of Assist: Stand-by assistance  Exercise Type #3: Seated lower extremity strengthening(assisted hip abd/add left LE, green Tband right LE)  Sets Performed: 3  Reps Performed: 15  Level of Assist: (mod A left LE, SBA with right LE)    Cues for appropriate technique with performing exercises to better be able to perform transfers and maintain NWB status left LE . Patient needing cues for more neutral left hip position during exercises (tends toward significant hip ER unless cued). ASSESSMENT:  Recommend ongoing skilled PT to improve ability to perform transfers, wheelchair mobility, sitting and standing balance for ease of mobility. Patient better able to maintain NWB left LE today without as much assist from therapist. Recommend ongoing strengthening to continue ongoing compliance and safety with NWB left LE for ease of transfers and progression to gait as appropriate. Progression toward goals:  ?      Improving appropriately and progressing toward goals  ? Improving slowly and progressing toward goals  ? Not making progress toward goals and plan of care will be adjusted     PLAN:  Patient continues to benefit from skilled intervention to address the above impairments.   Continue treatment per established plan of care. Discharge Recommendations:  Home Health with progression to outpatient PT  Further Equipment Recommendations for Discharge:  wheelchiar, cushion, RW , ramp, elevating legrests . Estimated LOS: 2-3 weeks    Activity Tolerance:   Fair due to fatigue. After treatment:   Patient left seated in wheelchair after first session, in recliner chair after second session, call button within reach.        Lali Pereira, PT  7/15/2019

## 2019-07-15 NOTE — INTERDISCIPLINARY ROUNDS
[address] 
[address] [place] [address], [address] INPATIENT REHABILITATION 
EQUIPMENT SHEET Date: 7/15/2019 Name: Killian Mark. Age / Sex: 80 y.o. / male CSN: [id number] MRN: [id number] Admit Date: 7/9/2019 Discharge Date: 7/23/19 WHEELCHAIR WALKING AIDS FOLLOW-UP SERVICES Height  []  Straight cane  [] DMV Handicap Placard  
 []  #14 ½ jacob height  []  Forearm crutches OUTPATIENT []  Jacob height  []  Axillary crutches  []  PT  
 [x]  Standard height  []  LBQC  []  OT  
 []  Reclining high back  []  SBQC  []  ST Weight  HOME HEALTH [x]  Standard weight WALKERS  [x]  PT  
 []  Lightweight  []  Standard height  [x]  OT  
 []  High-strength lightweight  []  Small adult  []  ST  
 []  Heavy Duty   []  Tall walker  []  Nursing  
 []  Patient is unable to self-propel a standard weight wheelchair  []  Rolling walker with 5 single fixed wheels  []  Wound care  
   []  Anticoagulation management Width  []  Bariatric walker  []  Diabetes management  
 []  Narrow (16)   []  Insulin management  
 [x]  Standard (18) ACCESSORIES  []  No insulin management  
 []  Wide (20)  []  Rear sure glide brakes  []  BP management  
 []  Other Pt prefers taller chair height if possible due to leg length (currently in 17\" high chair - would prefer 18\" which will assist with maintaining total hip precautions)  []  10 rear wheel brake  []  CHF Telehealth Arms  []  Tall leg extensions  []  Post-CABG care/monitoring  
 []  Standard  []  Other:  []  Colostomy care  
 []  Desk   []  Tube feeding  
 [x]  Removable - swing away arm rests for ability to maintain NWB left LE with lateral transfers utilizing transfer board BATHROOM EQUIPMENT  []  PICC line care Foot/Leg Rests  []  Bedside commode  []  Truong catheter care [x]  Removable  []  Extra wide bedside commode  []  Other: [x]  Elevating  [x]  3:1 commode W/O drop arms  []  Medical Social Worker Other  []  3:1 commode WITH drop arms  []  Aide  
 [x]  Brake extensions  []  Tub transfer bench   
 []  Padded gloves  []  Tub chair   
 [x]  Antitippers  []  Other: CUSHIONS OTHER   
 []  Foam cushion  []  Seat belt [x]  Gel cushion  []  Gait belt   
 []  Katarzyna Murphy II  [x]  Transfer board - Size: 32\" []  Roho  []  Oxygen   
 []  Other  []  CPM   
  []  Holly Blunt [address]  []  Ramp   
 []  Hospital bed  []  Hip kit   
 []  Special mattress  []  Reacher   
 []  Trapeze bar Preferred Local Pharmacy (not mail-order): [facility] Ozie Schirmer PT, DPT Occupational Therapy Makayla Berg, OTR/L Speech-Language Therapy Social Work Carmelina Russ, MSW Nursing Mariano Armstrong RN

## 2019-07-15 NOTE — PROGRESS NOTES
Problem: Self Care Deficits Care Plan (Adult)  Goal: *Therapy Goal (Edit Goal, Insert Text)  Description  Occupational Therapy Goals   Long Term Goals  Initiated 7/10/19 and to be accomplished within 2 week(s) 19  1. Pt will perform self-feeding with I.  2. Pt will perform grooming with S.  3. Pt will perform UB bathing with S.  4. Pt will perform LB bathing with S.  5. Pt will perform tub/shower transfer with S.   6. Pt will perform UB dressing with S.  7. Pt will perform LB dressing with S.  8. Pt will perform toileting task with [facility]. 9. Pt will perform toilet transfer with S.    Short Term Goals   Initiated 7/10/19 and to be accomplished within 7 day(s)  19  1. Pt will perform self-feeding with S.   2. Pt will perform grooming with S.  3. Pt will perform UB bathing with S.  4. Pt will perform LB bathing with [place]. 5. Pt will perform tub/shower transfer with [facility]. 6. Pt will perform UB dressing with S.  7. Pt will perform LB dressing with [place]. 8. Pt will perform toileting task with [place]. 9. Pt will perform toilet transfer with [facility]. Outcome: Progressing Towards Goal   OCCUPATIONAL THERAPY TREATMENT    Patient: Mauricio Romo.   80 y.o. Patient identified with name and : yes    Date: 7/15/2019    First Tx Session  Time In: 5  Time Out[de-identified] 7761      Diagnosis: Status post open reduction with internal fixation of fracture [Z96.7, Z87.81]   Precautions:  NWB L Le, Hip precautions   Chart, occupational therapy assessment, plan of care, and goals were reviewed. Pain:  Pt reports 7/10 pain or discomfort prior to treatment. Pt reports 7/10 pain or discomfort post treatment. Intervention Provided: Nursing notified for pain medicine at end of tx (per pain med distribution timing). Pt provided with positioning and rest breaks during tx.        SUBJECTIVE:   Patient's wife reporting that she was able to go see handicap hotel room pt will be discharging to w/ wife reporting shower is a walk in w/o any lip to step over. Pt's wife also requesting if it would be recommended to get 3:1 for hotel room w/ OT providing information and education for recommendation, wife/pt receptive. Spouse agreeable to be present for pt's ADL in AM to allow for check off for wife to bathe/dress pt out of therapy times per report that wife stating she is currently, \"cleared,\" to perform ADL with patient currently. Spouse educated on therapist needing to be present to ensure pt's precautions are maintained prior to check off w/ spouse demo'ing understanding. OBJECTIVE DATA SUMMARY:   Pt approached for tx supine on therapy mat finishing PT session, pt agreeable. Pt performed supine>sit EOM w/ modA for LE management. Pt provided with ped-alert boot for left LE to ensure maintaining NWB status during functional transfers. Pt performed simulated toilet transfer for repetition practice. Pt performed EOM>w/c>3:1 commode, at the RW level, with min audible feedback via ped-alert boot during transfer. Pt demo'ing majority of audible feedback during stand>sit transfer, pt educated on clearing foot more anterior prior to sitting to maintain NWB status. Pt performed 1x sit<>stand for repetition w/ increased independence. Pt performed 3:1>w/c transfer with CGA overall. Patient Vitals for the past 4 hrs:   Pulse BP SpO2   07/15/19 1224 74 136/76 98 %           THERAPEUTIC ACTIVITY Daily Assessment   Pt performed theract to facilitate increased UB/RLE strength/endurance and standing activity tolerance necessary for increased independence w/ ADL tasks and functional transfers. Performed standing endurance/tolerance task w/ ped-alert boot donned to provide auditory feedback for NWB status. Pt stood x 5:10 seconds to perform peg board activity w/ pt reporting he needed to sit d/t \"feeling like my head is swaying back and forth. \" Pt provided with water and vitals assessed (see above).       THERAPEUTIC EXERCISE Daily Assessment Pt performed therex to facilitate increased sit<>stand supporting musculature strengthening (rotator cuff, rhomboids) for increased functional mobility independence. Seated in w/c pt performed therex with green theraband: 3 x 10 shldrs at 90 degrees flexion abd/adduction, 3 x 10 scapular rows, 3 x 10 elbows bent to 90 degrees at side abd/adduction. ASSESSMENT:  Pt progressing with sit<>stand independence at the RW level with pedi-alert boot donned for pt feedback. Pt to perform full shower ADL w/ spouse present for carryover to d/c environment. Progression toward goals:  ?          Improving appropriately and progressing toward goals  ? Improving slowly and progressing toward goals  ? Not making progress toward goals and plan of care will be adjusted     PLAN:  Patient continues to benefit from skilled intervention to address the above impairments. Continue treatment per established plan of care. Discharge Recommendations:  Home Health  Further Equipment Recommendations for Discharge:  bedside commode      Activity Tolerance:  Fair       Estimated LOS: 7/23/2019    Please refer to the flowsheet for vital signs taken during this treatment. After treatment:   ?  Patient left in no apparent distress sitting up in wheel chair   ? Caregiver present  ? Chair alarm activated    COMMUNICATION/EDUCATION:   ? Home safety education was provided and the patient/caregiver indicated understanding. ? Patient/family have participated as able in goal setting and plan of care. ? Patient/family agree to work toward stated goals and plan of care. ? Patient understands intent and goals of therapy, but is neutral about his/her participation. ? Patient is unable to participate in goal setting and plan of care.       Logan Buerger, OT

## 2019-07-15 NOTE — PROGRESS NOTES
[facility] PHYSICAL REHABILITATION  [address] [place] [address], [address]     INPATIENT REHABILITATION  DAILY PROGRESS NOTE     Date: 7/15/2019    Name: Pamela Be. Age / Sex: 80 y.o. / male   CSN: [id number] MRN: [id number]   Admit Date: 7/9/2019 Length of Stay: 6 days     Primary Rehab Diagnosis: Impaired Mobility and ADLs secondary to:  1. S/P Open reduction internal fixation of displaced subtrochanteric periprosthetic fracture around internal prosthetic left hip joint (6/25/2019 - Dr. Biju Bedolla)  2. History of displaced subtrochanteric periprosthetic fracture of left femur around internal prosthetic left hip joint; S/P Left total hip replacement (2/18/2019 - Dr. Biju Bedolla)      Subjective:     Patient seen and examined. Blood pressure controlled. Patient's Complaint:   No significant medical complaints    Pain Control: ongoing significant pain in which is stable and controlled by current meds      Objective:     Vital Signs:  Patient Vitals for the past 24 hrs:   BP Temp Pulse Resp SpO2   07/15/19 1224 136/76  74  98 %   07/15/19 0813 136/72 98.2 °F (36.8 °C) 69 16 97 %   07/14/19 2132 135/68 97.1 °F (36.2 °C) 65 20 98 %        Physical Examination:  GENERAL SURVEY: Patient is awake, alert, oriented x 3, sitting comfortably on the chair, not in acute respiratory distress. HEENT: pale palpebral conjunctivae, anicteric sclerae, no nasoaural discharge, moist oral mucosa  NECK: supple, no jugular venous distention, no palpable lymph nodes  CHEST/LUNGS: symmetrical chest expansion, good air entry, clear breath sounds  HEART: adynamic precordium, good S1 S2, no S3, regular rhythm, no murmurs  ABDOMEN: obese, bowel sounds appreciated, soft, non-tender  EXTREMITIES: pale nailbeds, no edema, full and equal pulses, no calf tenderness   NEUROLOGICAL EXAM: The patient is awake, alert and oriented x3, able to answer questions fairly appropriately, able to follow 1 and 2 step commands. Able to tell time from the wall clock. Cranial nerves II-XII are grossly intact. No gross sensory deficit. Motor strength is 4+/5 on BUE, 4 to 4+/5 on the RLE, 2-/5 on the LLE (except 3- on the left ankle).      Incision(s): healing well, clean, dry, and intact      Current Medications:  Current Facility-Administered Medications   Medication Dose Route Frequency    cholecalciferol (VITAMIN D3) capsule 5,000 Units  5,000 Units Oral DAILY    calcium citrate tablet 200 mg [elemental]  200 mg Oral BID    warfarin - physician to dose   Other Q24H    ferrous sulfate tablet 325 mg  1 Tab Oral DAILY WITH BREAKFAST    ascorbic acid (vitamin C) (VITAMIN C) tablet 250 mg  250 mg Oral DAILY WITH BREAKFAST    methocarbamol (ROBAXIN) tablet 500 mg  500 mg Oral Q8H    acetaminophen (TYLENOL) tablet 650 mg  650 mg Oral Q4H PRN    bisacodyl (DULCOLAX) tablet 10 mg  10 mg Oral Q48H PRN    HYDROcodone-acetaminophen (NORCO) 5-325 mg per tablet 1 Tab  1 Tab Oral Q4H PRN    simvastatin (ZOCOR) tablet 10 mg  10 mg Oral QHS       Allergies:  No Known Allergies      Lab/Data Review:  Recent Results (from the past 24 hour(s))   METABOLIC PANEL, BASIC    Collection Time: 07/15/19  6:32 AM   Result Value Ref Range    Sodium 137 136 - 145 mmol/L    Potassium 4.2 3.5 - 5.5 mmol/L    Chloride 105 100 - 108 mmol/L    CO2 27 21 - 32 mmol/L    Anion gap 5 3.0 - 18 mmol/L    Glucose 93 74 - 99 mg/dL    BUN 11 7.0 - 18 MG/DL    Creatinine 0.88 0.6 - 1.3 MG/DL    BUN/Creatinine ratio 13 12 - 20      GFR est AA >60 >60 ml/min/1.73m2    GFR est non-AA >60 >60 ml/min/1.73m2    Calcium 8.2 (L) 8.5 - 10.1 MG/DL   HGB & HCT    Collection Time: 07/15/19  6:32 AM   Result Value Ref Range    HGB 9.3 (L) 13.0 - 16.0 g/dL    HCT 29.5 (L) 36.0 - 48.0 %   PROTHROMBIN TIME + INR    Collection Time: 07/15/19  6:32 AM   Result Value Ref Range    Prothrombin time 26.9 (H) 11.5 - 15.2 sec    INR 2.5 (H) 0.8 - 1.2         Estimated Glomerular Filtration Rate:  On admission, estimated GFR based on a Creatinine of 0.89 mg/dl:              Using CKD-EPI = 78.5 mL/min/1.73m2              Using MDRD = 86.6 mL/min/1.73m2      Assessment:     Primary Rehabilitation Diagnosis  1. Impaired Mobility and ADLs  2. S/P Open reduction internal fixation of displaced subtrochanteric periprosthetic fracture around internal prosthetic left hip joint (6/25/2019 - Dr. Herbie Zaldivar)  3. History of displaced subtrochanteric periprosthetic fracture of left femur around internal prosthetic left hip joint; S/P Left total hip replacement (2/18/2019 - Dr. 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    Mild aortic stenosis I35.0    Acute blood loss as cause of postoperative anemia D62    Acute pulmonary embolism  I26.99    Anticoagulated on warfarin Z79.01    Primary osteoarthritis involving multiple joints M15.0    Gastroesophageal reflux disease K21.9    Dyslipidemia E78.5    Diverticulosis K57.90    History of deep vein thrombosis of lower extremity Z86.718    Sick sinus syndrome  I49.5    Benign prostatic hyperplasia with lower urinary tract symptoms N40.1    Tremor R25.1    Obesity, Class I, BMI 30-34.9 E66.9    Vitamin D deficiency E55.9    Essential tremor G25.0    History of pericarditis Z86.79        Plan:     1. Justification for continued stay: Good progression towards established rehabilitation goals. 2. Medical Issues being followed closely:    [x]  Fall and safety precautions     [x]  Wound Care     [x]  Bowel and Bladder Function     [x]  Fluid Electrolyte and Nutrition Balance     [x]  Pain Control      3.  Issues that 24 hour rehabilitation nursing is following:    [x]  Fall and safety precautions     [x]  Wound Care     [x]  Bowel and Bladder Function     [x]  Fluid Electrolyte and Nutrition Balance     [x]  Pain Control      [x]  Assistance with and education on in-room safety with transfers to and from the bed, wheelchair, toilet and shower. 4. Acute rehabilitation plan of care:    [x]  Continue current care and rehab. [x]  Physical Therapy           [x]  Occupational Therapy           []  Speech Therapy     []  Hold Rehab until further notice     5. Medications:    [x]  MAR Reviewed     [x]  Continue Present Medications     6. DVT Prophylaxis:      []  Lovenox     []  Unfractionated Heparin     [x]  Coumadin     []  NOAC     []  WINDY Stockings     []  Sequential Compression Device     []  None     7. Orders:   > S/P Open reduction internal fixation of displaced subtrochanteric periprosthetic fracture around internal prosthetic left hip joint (6/25/2019 - Dr. Sonya Swartz);  History of displaced subtrochanteric periprosthetic fracture of left femur around internal prosthetic left hip joint; S/P Left total hip replacement (2/18/2019 - Dr. Sonya Swartz)              > Nonweightbearing on the left lower extremity              > Total hip precautions on left hip              > Staples were removed on 7/9/2019               > On 7/10/2019, started Calcium citrate 1 tab PO BID   > Continue Calcium citrate 1 tab PO BID     > Acute Postoperative Blood Loss Anemia              > Hgb/Hct (7/10/2019, on admission) = 8.8/28.0              > Anemia work-up showed serum iron 40, TIBC 237, iron % saturation 17, ferritin 568, reticulocyte count 5.0   > On 7/10/2019, started FeSO4 and Ascorbic acid   > Hgb/Hct (7/15/2019) = 9.3/29.5    > Continue:               > FeSO4 325 mg PO once daily with breakfast                > Ascorbic Acid 250 mg PO once daily with breakfast (to enhance the absorption of the FeSO4)      > Acute pulmonary embolism, anticoagulated on Warfarin              > Target INR = 2 to 3              > INR (7/15/2019) = 2.5              > Patient received Warfarin 5 mg PO last night               > Warfarin 4 mg PO tonight                > Sepsis due to Pseudomonas aeruginosa              > Blood culture (7/5/2019; 1 out of 2 sets) yielded growth of PSeudomonas aeruginosa (PANSENSITIVE)              > On 7/5/2019, patient was empirically started on Piperacillin-Tazobactam 4.5 grams IV q 6 hr and a single dose of Vancomycin IV was given              > On 7/9/2019, Piperacillin-Tazobactam IV was discontinued and patient was discharged from [facility] on oral Ciprofloxacin 500 mg PO q 12 hr x 4 days              > On 7/13/2019, patient had completed the recommended 4-day treatment course of Ciprofloxacin 500 mg PO q 12 hr      > Vitamin D Deficiency              > Vitamin D 25-Hydroxy (7/10/2019) = 18.6              > On 7/10/2019, patient was given Cholecalciferol 50,000 units PO x 1 dose               > On 7/11/2019, started Cholecalciferol 5,000 units PO once daily   > Continue Cholecalciferol 5,000 units PO once daily     > Analgesia   > Add Diclofenac 1.3% transdermal patch, 1 patch on skin of left thigh q 12 hr   > Continue:               > Acetaminophen 650 mg PO q 4 hr PRN for pain level less than 5/10               > Methocarbamol 500 mg PO q 8 hr                > Norco 5/325 1 tab PO q 4 hr PRN for pain level greater than 4/10      > Diet:              > Specifications: Low saturated fat              > Solids (consistency): Regular               > Liquids (consistency): Thin       8. Patient's progress in rehabilitation and medical issues discussed with the patient. All questions answered to the best of my ability. Care plan discussed with patient and nurse.       Signed:    Oniel Erazo MD    July 15, 2019

## 2019-07-15 NOTE — PROGRESS NOTES
SHIFT CHANGE NOTE FOR [facility]    Bedside and Verbal shift change report given to Lamont Hadley, RN (oncoming nurse) by Migel Bates RN (offgoing nurse). Report included the following information SBAR, Kardex, MAR and Recent Results. Situation:   Code Status: Full Code   Reason for Admission: S/P ORIF  Hospital Day: 6   Problem List:   Hospital Problems  Date Reviewed: 7/12/2019          Codes Class Noted POA    Vitamin D deficiency (Chronic) ICD-10-CM: E55.9  ICD-9-CM: 268.9  7/10/2019 Yes    Overview Signed 7/10/2019 11:23 AM by Gabriel Wong MD     Vitamin D 25-Hydroxy (7/10/2019) = 18.6              Sepsis due to Pseudomonas species [facility]) ICD-10-CM: A41.52  ICD-9-CM: 038.43, 995.91  7/5/2019 Yes    Overview Addendum 7/6/2019  8:16 PM by Jojo Chilel MD     7/5/19      80 y.o. male recently admitted for femur fracturesent from [facility] nursing [facility] for fever to 102.9. Patient was recently seen at [facility] for hip replacement status post fall. Patient endorses retrosternal chest pain that started upon waking this morning has been persistent since without propagating or palliating factors. Patient also endorses increased bilateral lower extremity swelling and difficulty breathing. WBC 29.2  P-87%,  U/A neg  CT  Heterogeneous collection posterior to the greater trochanter, most consistent with a postoperative hematoma. No rim enhancement to suggest abscess  CXR Small bilateral pleural effusions, left greater than right. Mild pulmonary edema.  No consolidation  7/6/19 ORTHO ([facility]) Benign appearing left hip and femur now 11 days s/p ORIF periprosthetic hip fracture                         Impaired mobility and ADLs ICD-10-CM: Z74.09  ICD-9-CM: 799.89  7/5/2019 Yes        Acute pulmonary embolism ([facility] [address] ICD-10-CM: I26.99  ICD-9-CM: 415.19  7/1/2019 Yes    Overview Signed 7/9/2019  5:07 PM by Gabriel Wong MD     Pulmonary emboli at the periphery of the right main pulmonary artery extending into the middle and lower lobe branches             Anticoagulated on warfarin (Chronic) ICD-10-CM: Z79.01  ICD-9-CM: V58.61  7/1/2019 Yes        * (Principal) Status post open reduction with internal fixation of fracture ICD-10-CM: Z96.7, Z87.81  ICD-9-CM: V45.89, V15.51  6/25/2019 Yes    Overview Signed 7/9/2019  4:52 PM by Marybeth Cruz MD     S/P Open reduction internal fixation of displaced subtrochanteric periprosthetic fracture around internal prosthetic left hip joint (6/25/2019 - Dr. Rafael Smith)             Acute blood loss as cause of postoperative anemia ICD-10-CM: D62  ICD-9-CM: 285.1  6/25/2019 Yes        Periprosthetic fracture around internal prosthetic left hip joint ([facility] [address] ICD-10-CM: M97. 02XA  ICD-9-CM: 996.44, V43.64  6/24/2019 Yes    Overview Signed 7/9/2019  5:04 PM by Marybeth Cruz MD     S/P Open reduction internal fixation of displaced subtrochanteric periprosthetic fracture around internal prosthetic left hip joint (6/25/2019 - Dr. Rafael Smith)             Displaced subtrochanteric fracture of left femur [facility]) ICD-10-CM: H84.16FO  ICD-9-CM: 820.22  6/24/2019 Yes    Overview Signed 7/9/2019  5:02 PM by Marybeth Cruz MD     S/P Open reduction internal fixation of displaced subtrochanteric periprosthetic fracture around internal prosthetic left hip joint (6/25/2019 - Dr. Rafael Smith)             History of total left hip arthroplasty ICD-10-CM: Y91.082  ICD-9-CM: V43.64  2/18/2019 Yes    Overview Addendum 7/9/2019  5:01 PM by Marybeth Cruz MD     S/P Left total hip replacement (2/18/2019 - Dr. Rafael Smith)                   Background:   Past Medical History:   Past Medical History:   Diagnosis Date    Acute blood loss as cause of postoperative anemia 6/25/2019    Acute pulmonary embolism ([facility] [address] 7/1/2019    Pulmonary emboli at the periphery of the right main pulmonary artery extending into the middle and lower lobe branches    Anticoagulated on warfarin 7/1/2019    Benign prostatic hyperplasia with lower urinary tract symptoms     Demand ischemia of myocardium ([address] 7/6/2019 4/16/18 NST EF 59% , small, reversible apical defect 7/2/19 ECHO EF 68%, LVH 14/16, LAE, RVE, ANDREY 1.4cm2 with 12mm mean gradient    Diverticulosis     Dyslipidemia     Essential tremor     Gastroesophageal reflux disease     History of pericarditis     Obesity, Class I, BMI 30-34.9     Paroxysmal atrial fibrillation ([address]     CVAL Maddy Saleh FNP 2008  AF ablation 9/11/13 EPS with isolation of RSPV and LIPV 6/4/18 TEDDY  EF 55%, 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 male recently admitted for femur fracturesent from [facility] for fever to 102.9. Patient was recently seen at [facility] for hip replacement status post fall. Patient endorses retrosternal chest pain that started upon waking this morning has been persistent since without propagating or palliating factors.   Patient also endorses increased bilateral lower extremity s    Sick sinus syndrome ([address]     Vitamin D deficiency 7/10/2019    Vitamin D 25-Hydroxy (7/10/2019) = 18.6       Patient taking anticoagulants yes Coumadin   Patient has a defibrillator: no     Assessment:   Changes in Assessment throughout shift: no     Patient has central line: no Reasons if yes: na  Insertion date:na Last dressing date:na   Patient has Truong Cath: no Reasons if yes: n/a   Insertion date:n/a     Last Vitals:     Vitals:    07/13/19 2139 07/14/19 0726 07/14/19 1537 07/14/19 2132   BP: 142/77 128/64 127/64 135/68   Pulse: 74 67 69 65   Resp: 20 18 20 20   Temp: 98.3 °F (36.8 °C) 97.6 °F (36.4 °C) 97.4 °F (36.3 °C) 97.1 °F (36.2 °C)   SpO2: 97% 96% 98% 98%   Weight:       Height:            PAIN    Pain Assessment    Pain Intensity 1: 0 (07/15/19 0326) Pain Intensity 1: 2 (12/29/14 1105)    Pain Location 1: Leg Pain Location 1: Abdomen    Pain Intervention(s) 1: Medication (see MAR), Ice, Repositioned Pain Intervention(s) 1: Medication (see MAR)  Patient Stated Pain Goal: 0 Patient Stated Pain Goal: 0  o Intervention effective: yes    o Other actions taken for pain: no     Skin Assessment  Skin color Skin Color: Appropriate for ethnicity  Condition/Temperature Skin Condition/Temp: Dry, Warm  Integrity Skin Integrity: Incision (comment)  Turgor Turgor: Non-tenting  Weekly Pressure Ulcer Documentation  Pressure  Injury Documentation: No Pressure Injury Noted-Pressure Ulcer Prevention Initiated  Wound Prevention & Protection Methods  Orientation of wound Orientation of Wound Prevention: Posterior  Location of Prevention Location of Wound Prevention: Buttocks, Sacrum/Coccyx  Dressing Present Dressing Present : No  Dressing Status    Wound Offloading Wound Offloading (Prevention Methods): Adaptive equipment, Bed, pressure reduction mattress, Chair cushion, Elevate heels, Pillows, Repositioning, Turning, Wheelchair     INTAKE/OUPUT  Date 07/14/19 0700 - 07/15/19 0659 07/15/19 0700 - 07/16/19 0659   Shift 2289-4637 2611-9007 24 Hour Total 4204-2830 2701-4997 24 Hour Total   INTAKE   P.O. 1520  1520        P. O. 1520  1520      Shift Total(mL/kg) 1520(14.7)  1520(14.7)      OUTPUT   Urine(mL/kg/hr)           Urine Occurrence(s) 4 x  4 x      Stool           Stool Occurrence(s) 1 x  1 x      Shift Total(mL/kg)         NET 1520  1520      Weight (kg) 103.4 103.4 103.4 103.4 103.4 103.4       Recommendations:  1. Patient needs and requests: addressed    2. Diet: cardiac    3. Pending tests/procedures: none     4. Functional Level/Equipment: whellchair    5. Estimated Discharge Date: TBD Posted on Whiteboard in Patients Room: no     \[facility] [place] [facility]\"" Safety Check    A safety check occurred in the patient's room between off going nurse and oncoming nurse listed above.     The safety check included the below items  Area Items   H  High Alert Medications - Verify all high alert medication drips (heparin, PCA, etc.)   E  Equipment - Suction is set up for ALL patients (with presley)  - Red plugs utilized for all equipment (IV pumps, etc.)  - WOWs wiped down at end of shift.  - Room stocked with oxygen, suction, and other unit-specific supplies   A  Alarms - Bed alarm is set for fall risk patients  - Ensure chair alarm is in place and activated if patient is up in a chair   L  Lines - Check IV for any infiltration  - Truong bag is empty if patient has a Truong   - Tubing and IV bags are labeled   S  Safety   - Room is clean, patient is clean, and equipment is clean. - Hallways are clear from equipment besides carts. - Fall bracelet on for fall risk patients  - Ensure room is clear and free of clutter  - Suction is set up for ALL patients (with presley)  - Hallways are clear from equipment besides carts.    - Isolation precautions followed, supplies available outside room, sign posted

## 2019-07-15 NOTE — PROGRESS NOTES
Problem: Falls - Risk of  Goal: *Absence of Falls  Description  Document Marcial Fells Fall Risk and appropriate interventions in the flowsheet. Outcome: Progressing Towards Goal     Problem: Patient Education: Go to Patient Education Activity  Goal: Patient/Family Education  Outcome: Progressing Towards Goal     Problem: Pressure Injury - Risk of  Goal: *Prevention of pressure injury  Description  Document Kartik Scale and appropriate interventions in the flowsheet.   Outcome: Progressing Towards Goal     Problem: Patient Education: Go to Patient Education Activity  Goal: Patient/Family Education  Outcome: Progressing Towards Goal

## 2019-07-15 NOTE — PROGRESS NOTES
Pt is an 80year old male admitted to ARU for a left hip fracture. Pt is alert and oriented, alone in the room. Pt states that he currently is staying in his daughter's home until he and his wife can move into their new handicap accessible home in [place]. Pt states that locally the home is 2 levels with 3 steps to enter and he can stay on the first floor. Pt states that he has a rolling walker at home and denies further DME. Pt has used home health in the past but is unable to recall the agency. Pt states that his wife, Clarissa Coleman [phone]) is his POA and NOK contact. Pt confirms his insurance as Medicare and [employer]. Sw reviewed dc planning and team conference. Pt states understanding and denies questions at this time.

## 2019-07-15 NOTE — PROGRESS NOTES
Problem: Falls - Risk of  Goal: *Absence of Falls  Description  Document Tasneem Olmos Fall Risk and appropriate interventions in the flowsheet. Outcome: Progressing Towards Goal     Problem: Patient Education: Go to Patient Education Activity  Goal: Patient/Family Education  Outcome: Progressing Towards Goal     Problem: Pressure Injury - Risk of  Goal: *Prevention of pressure injury  Description  Document Kartik Scale and appropriate interventions in the flowsheet.   Outcome: Progressing Towards Goal     Problem: Patient Education: Go to Patient Education Activity  Goal: Patient/Family Education  Outcome: Progressing Towards Goal     Problem: Pain  Goal: *Control of Pain  Outcome: Progressing Towards Goal     Problem: Patient Education: Go to Patient Education Activity  Goal: Patient/Family Education  Outcome: Progressing Towards Goal

## 2019-07-15 NOTE — PROGRESS NOTES
SHIFT CHANGE NOTE FOR [facility]    Bedside and Verbal shift change report given to Mariah Owens RN (oncoming nurse) by Viry Robles RN (offgoing nurse). Report included the following information SBAR, Kardex, MAR and Recent Results. Situation:   Code Status: Full Code   Reason for Admission: S/P ORIF  Hospital Day: 6   Problem List:   Hospital Problems  Date Reviewed: 7/15/2019          Codes Class Noted POA    Vitamin D deficiency (Chronic) ICD-10-CM: E55.9  ICD-9-CM: 268.9  7/10/2019 Yes    Overview Signed 7/10/2019 11:23 AM by Jose Luis Blum MD     Vitamin D 25-Hydroxy (7/10/2019) = 18.6              Sepsis due to Pseudomonas species [facility]) ICD-10-CM: A41.52  ICD-9-CM: 038.43, 995.91  7/5/2019 Yes    Overview Addendum 7/6/2019  8:16 PM by Isidro Ingram MD     7/5/19      80 y.o. male recently admitted for femur fracturesent from [facility] nursing [facility] for fever to 102.9. Patient was recently seen at [facility] [place] for hip replacement status post fall. Patient endorses retrosternal chest pain that started upon waking this morning has been persistent since without propagating or palliating factors. Patient also endorses increased bilateral lower extremity swelling and difficulty breathing. WBC 29.2  P-87%,  U/A neg  CT  Heterogeneous collection posterior to the greater trochanter, most consistent with a postoperative hematoma. No rim enhancement to suggest abscess  CXR Small bilateral pleural effusions, left greater than right. Mild pulmonary edema.  No consolidation  7/6/19 ORTHO (Iliana Negrete) Benign appearing left hip and femur now 11 days s/p ORIF periprosthetic hip fracture                         Impaired mobility and ADLs ICD-10-CM: Z74.09  ICD-9-CM: 799.89  7/5/2019 Yes        Acute pulmonary embolism ([facility] [address] ICD-10-CM: I26.99  ICD-9-CM: 415.19  7/1/2019 Yes    Overview Signed 7/9/2019  5:07 PM by Jose Luis Blum MD     Pulmonary emboli at the periphery of the right main pulmonary artery extending into the middle and lower lobe branches             Anticoagulated on warfarin (Chronic) ICD-10-CM: Z79.01  ICD-9-CM: V58.61  7/1/2019 Yes        * (Principal) Status post open reduction with internal fixation of fracture ICD-10-CM: Z96.7, Z87.81  ICD-9-CM: V45.89, V15.51  6/25/2019 Yes    Overview Signed 7/9/2019  4:52 PM by Sherman Rodriguez MD     S/P Open reduction internal fixation of displaced subtrochanteric periprosthetic fracture around internal prosthetic left hip joint (6/25/2019 - Dr. Ti De León)             Acute blood loss as cause of postoperative anemia ICD-10-CM: D62  ICD-9-CM: 285.1  6/25/2019 Yes        Periprosthetic fracture around internal prosthetic left hip joint ([facility] ICD-10-CM: M97. 02XA  ICD-9-CM: 996.44, V43.64  6/24/2019 Yes    Overview Signed 7/9/2019  5:04 PM by Sherman Rodriguez MD     S/P Open reduction internal fixation of displaced subtrochanteric periprosthetic fracture around internal prosthetic left hip joint (6/25/2019 - Dr. Ti De León)             Displaced subtrochanteric fracture of left femur [facility]) ICD-10-CM: X60.94BR  ICD-9-CM: 820.22  6/24/2019 Yes    Overview Signed 7/9/2019  5:02 PM by Sherman Rodriguez MD     S/P Open reduction internal fixation of displaced subtrochanteric periprosthetic fracture around internal prosthetic left hip joint (6/25/2019 - Dr. Ti De León)             History of total left hip arthroplasty ICD-10-CM: N10.250  ICD-9-CM: V43.64  2/18/2019 Yes    Overview Addendum 7/9/2019  5:01 PM by Sherman Rodriguez MD     S/P Left total hip replacement (2/18/2019 - Dr. Ti De León)                   Background:   Past Medical History:   Past Medical History:   Diagnosis Date    Acute blood loss as cause of postoperative anemia 6/25/2019    Acute pulmonary embolism ([facility] [address] 7/1/2019    Pulmonary emboli at the periphery of the right main pulmonary artery extending into the middle and lower lobe branches    Anticoagulated on warfarin 7/1/2019    Benign prostatic hyperplasia with lower urinary tract symptoms     Demand ischemia of myocardium ([facility] [address] 7/6/2019 4/16/18 NST EF 59% , small, reversible apical defect 7/2/19 ECHO EF 68%, LVH 14/16, LAE, RVE, ANDREY 1.4cm2 with 12mm mean gradient    Diverticulosis     Dyslipidemia     Essential tremor     Gastroesophageal reflux disease     History of pericarditis     Obesity, Class I, BMI 30-34.9     Paroxysmal atrial fibrillation ([address]     CVAL Maddy Saleh FNP 2008  AF ablation 9/11/13 EPS with isolation of RSPV and LIPV 6/4/18 TEDDY  EF 55%, 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 male recently admitted for femur fracturesent from [facility] for fever to 102.9. Patient was recently seen at [facility] for hip replacement status post fall. Patient endorses retrosternal chest pain that started upon waking this morning has been persistent since without propagating or palliating factors.   Patient also endorses increased bilateral lower extremity s    Sick sinus syndrome ([address]     Vitamin D deficiency 7/10/2019    Vitamin D 25-Hydroxy (7/10/2019) = 18.6       Patient taking anticoagulants yes Coumadin   Patient has a defibrillator: no     Assessment:   Changes in Assessment throughout shift: no     Patient has central line: no Reasons if yes: na  Insertion date:na Last dressing date:na   Patient has Truong Cath: no Reasons if yes: n/a   Insertion date:n/a     Last Vitals:     Vitals:    07/14/19 2132 07/15/19 0813 07/15/19 1224 07/15/19 1556   BP: 135/68 136/72 136/76 119/71   Pulse: 65 69 74 77   Resp: 20 16  18   Temp: 97.1 °F (36.2 °C) 98.2 °F (36.8 °C)  98.1 °F (36.7 °C)   SpO2: 98% 97% 98% 96%   Weight:       Height:            PAIN    Pain Assessment    Pain Intensity 1: 7 (07/15/19 4048) Pain Intensity 1: 2 (12/29/14 1105)    Pain Location 1: Leg Pain Location 1: Abdomen    Pain Intervention(s) 1: Medication (see MAR) Pain Intervention(s) 1: Medication (see MAR)  Patient Stated Pain Goal: 0 Patient Stated Pain Goal: 0  o Intervention effective: yes    o Other actions taken for pain: no     Skin Assessment  Skin color Skin Color: Appropriate for ethnicity  Condition/Temperature Skin Condition/Temp: Warm  Integrity Skin Integrity: Incision (comment)  Turgor Turgor: Non-tenting  Weekly Pressure Ulcer Documentation  Pressure  Injury Documentation: No Pressure Injury Noted-Pressure Ulcer Prevention Initiated  Wound Prevention & Protection Methods  Orientation of wound Orientation of Wound Prevention: Posterior  Location of Prevention Location of Wound Prevention: Sacrum/Coccyx  Dressing Present Dressing Present : No  Dressing Status    Wound Offloading Wound Offloading (Prevention Methods): Bed, pressure redistribution/air     INTAKE/OUPUT  Date 07/14/19 0700 - 07/15/19 0659 07/15/19 0700 - 07/16/19 0659   Shift 7587-9475 4681-2583 24 Hour Total 7950-6511 7769-2333 24 Hour Total   INTAKE   P.O. 1520  1520        P. O. 1520  1520      Shift Total(mL/kg) 1520(14.7)  1520(14.7)      OUTPUT   Urine(mL/kg/hr)           Urine Occurrence(s) 4 x 3 x 7 x 3 x  3 x   Stool           Stool Occurrence(s) 1 x 0 x 1 x 0 x  0 x   Shift Total(mL/kg)         NET 1520  1520      Weight (kg) 103.4 103.4 103.4 103.4 103.4 103.4       Recommendations:  1. Patient needs and requests: addressed    2. Diet: cardiac    3. Pending tests/procedures: none     4. Functional Level/Equipment: whellchair    5. Estimated Discharge Date: TBD Posted on Whiteboard in Patients Room: no     HEALS Safety Check    A safety check occurred in the patient's room between off going nurse and oncoming nurse listed above.     The safety check included the below items  Area Items   H  High Alert Medications - Verify all high alert medication drips (heparin, PCA, etc.) E  Equipment - Suction is set up for ALL patients (with presley)  - Red plugs utilized for all equipment (IV pumps, etc.)  - WOWs wiped down at end of shift.  - Room stocked with oxygen, suction, and other unit-specific supplies   A  Alarms - Bed alarm is set for fall risk patients  - Ensure chair alarm is in place and activated if patient is up in a chair   L  Lines - Check IV for any infiltration  - Truong bag is empty if patient has a Truong   - Tubing and IV bags are labeled   S  Safety   - Room is clean, patient is clean, and equipment is clean. - Hallways are clear from equipment besides carts. - Fall bracelet on for fall risk patients  - Ensure room is clear and free of clutter  - Suction is set up for ALL patients (with presley)  - Hallways are clear from equipment besides carts.    - Isolation precautions followed, supplies available outside room, sign posted

## 2019-07-15 NOTE — PROGRESS NOTES
SHIFT CHANGE NOTE FOR [facility]    Bedside and Verbal shift change report given to Sariah Sanchez RN (oncoming nurse) by Balbir Gordillo RN (offgoing nurse). Report included the following information SBAR, Kardex, MAR and Recent Results. Situation:   Code Status: Full Code   Reason for Admission: S/P ORIF  Hospital Day: 7   Problem List:   Hospital Problems  Date Reviewed: 7/15/2019          Codes Class Noted POA    Vitamin D deficiency (Chronic) ICD-10-CM: E55.9  ICD-9-CM: 268.9  7/10/2019 Yes    Overview Signed 7/10/2019 11:23 AM by Jose Luis Blum MD     Vitamin D 25-Hydroxy (7/10/2019) = 18.6              Sepsis due to Pseudomonas species [facility]) ICD-10-CM: A41.52  ICD-9-CM: 038.43, 995.91  7/5/2019 Yes    Overview Addendum 7/6/2019  8:16 PM by Isidro Ingram MD     7/5/19      80 y.o. male recently admitted for femur fracturesent from [facility] nursing [facility] for fever to 102.9. Patient was recently seen at [facility] for hip replacement status post fall. Patient endorses retrosternal chest pain that started upon waking this morning has been persistent since without propagating or palliating factors. Patient also endorses increased bilateral lower extremity swelling and difficulty breathing. WBC 29.2  P-87%,  U/A neg  CT  Heterogeneous collection posterior to the greater trochanter, most consistent with a postoperative hematoma. No rim enhancement to suggest abscess  CXR Small bilateral pleural effusions, left greater than right. Mild pulmonary edema.  No consolidation  7/6/19 ORTHO (Iliana Negrete) Benign appearing left hip and femur now 11 days s/p ORIF periprosthetic hip fracture                         Impaired mobility and ADLs ICD-10-CM: Z74.09  ICD-9-CM: 799.89  7/5/2019 Yes        Acute pulmonary embolism ([facility] [address] ICD-10-CM: I26.99  ICD-9-CM: 415.19  7/1/2019 Yes    Overview Signed 7/9/2019  5:07 PM by Jose Luis Blum MD     Pulmonary emboli at the periphery of the right main pulmonary artery extending into the middle and lower lobe branches             Anticoagulated on warfarin (Chronic) ICD-10-CM: Z79.01  ICD-9-CM: V58.61  7/1/2019 Yes        * (Principal) Status post open reduction with internal fixation of fracture ICD-10-CM: Z96.7, Z87.81  ICD-9-CM: V45.89, V15.51  6/25/2019 Yes    Overview Signed 7/9/2019  4:52 PM by Coleen Rodriguez MD     S/P Open reduction internal fixation of displaced subtrochanteric periprosthetic fracture around internal prosthetic left hip joint (6/25/2019 - Dr. Biju Bedolla)             Acute blood loss as cause of postoperative anemia ICD-10-CM: D62  ICD-9-CM: 285.1  6/25/2019 Yes        Periprosthetic fracture around internal prosthetic left hip joint ([facility] [address] ICD-10-CM: M97. 02XA  ICD-9-CM: 996.44, V43.64  6/24/2019 Yes    Overview Signed 7/9/2019  5:04 PM by Coleen Rodriguez MD     S/P Open reduction internal fixation of displaced subtrochanteric periprosthetic fracture around internal prosthetic left hip joint (6/25/2019 - Dr. Biju Bedolla)             Displaced subtrochanteric fracture of left femur [facility]) ICD-10-CM: Y94.35CQ  ICD-9-CM: 820.22  6/24/2019 Yes    Overview Signed 7/9/2019  5:02 PM by Coleen Rodriguez MD     S/P Open reduction internal fixation of displaced subtrochanteric periprosthetic fracture around internal prosthetic left hip joint (6/25/2019 - Dr. Biju Bedolla)             History of total left hip arthroplasty ICD-10-CM: B65.933  ICD-9-CM: V43.64  2/18/2019 Yes    Overview Addendum 7/9/2019  5:01 PM by Coleen Rodriguez MD     S/P Left total hip replacement (2/18/2019 - Dr. Biju Bedolla)                   Background:   Past Medical History:   Past Medical History:   Diagnosis Date    Acute blood loss as cause of postoperative anemia 6/25/2019    Acute pulmonary embolism ([facility] [address] 7/1/2019    Pulmonary emboli at the periphery of the right main pulmonary artery extending into the middle and lower lobe branches    Anticoagulated on warfarin 7/1/2019    Benign prostatic hyperplasia with lower urinary tract symptoms     Demand ischemia of myocardium ([facility] [address] 7/6/2019 4/16/18 NST EF 59% , small, reversible apical defect 7/2/19 ECHO EF 68%, LVH 14/16, LAE, RVE, ANDREY 1.4cm2 with 12mm mean gradient    Diverticulosis     Dyslipidemia     Essential tremor     Gastroesophageal reflux disease     History of pericarditis     Obesity, Class I, BMI 30-34.9     Paroxysmal atrial fibrillation ([facility] [address]     CVAL Maddy Saleh FNP 2008  AF ablation 9/11/13 EPS with isolation of RSPV and LIPV 6/4/18 TEDDY  EF 55%, 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 male recently admitted for femur fracturesent from [facility] nursing [facility] for fever to 102.9. Patient was recently seen at [facility] for hip replacement status post fall. Patient endorses retrosternal chest pain that started upon waking this morning has been persistent since without propagating or palliating factors.   Patient also endorses increased bilateral lower extremity s    Sick sinus syndrome ([facility] [address]     Vitamin D deficiency 7/10/2019    Vitamin D 25-Hydroxy (7/10/2019) = 18.6       Patient taking anticoagulants yes Coumadin   Patient has a defibrillator: no     Assessment:   Changes in Assessment throughout shift: no     Patient has central line: no Reasons if yes: na  Insertion date:na Last dressing date:na   Patient has Truong Cath: no Reasons if yes: n/a   Insertion date:n/a     Last Vitals:     Vitals:    07/15/19 0813 07/15/19 1224 07/15/19 1556 07/15/19 2121   BP: 136/72 136/76 119/71 124/69   Pulse: 69 74 77 70   Resp: 16 18 18   Temp: 98.2 °F (36.8 °C)  98.1 °F (36.7 °C) 97.7 °F (36.5 °C)   SpO2: 97% 98% 96% 97%   Weight:       Height:            PAIN    Pain Assessment    Pain Intensity 1: 0 (07/16/19 0354) Pain Intensity 1: 2 (12/29/14 1105)    Pain Location 1: Hip Pain Location 1: Abdomen    Pain Intervention(s) 1: Medication (see MAR) Pain Intervention(s) 1: Medication (see MAR)  Patient Stated Pain Goal: 0 Patient Stated Pain Goal: 0  o Intervention effective: yes    o Other actions taken for pain: no     Skin Assessment  Skin color Skin Color: Appropriate for ethnicity  Condition/Temperature Skin Condition/Temp: Dry, Warm  Integrity Skin Integrity: Incision (comment)  Turgor Turgor: Non-tenting  Weekly Pressure Ulcer Documentation  Pressure  Injury Documentation: No Pressure Injury Noted-Pressure Ulcer Prevention Initiated  Wound Prevention & Protection Methods  Orientation of wound Orientation of Wound Prevention: Posterior  Location of Prevention Location of Wound Prevention: Sacrum/Coccyx  Dressing Present Dressing Present : No  Dressing Status    Wound Offloading Wound Offloading (Prevention Methods): Bed, pressure redistribution/air, Repositioning     INTAKE/OUPUT  Date 07/15/19 0700 - 07/16/19 0659 07/16/19 0700 - 07/17/19 0659   Shift 2778-2944 5589-2052 24 Hour Total 1354-2874 7723-4749 24 Hour Total   INTAKE   Shift Total(mL/kg)         OUTPUT   Urine(mL/kg/hr)  2100(1.7) 2100(0.8)        Urine Voided  2100 2100        Urine Occurrence(s) 3 x 7 x 10 x      Stool           Stool Occurrence(s) 0 x 0 x 0 x      Shift Total(mL/kg)  4922(25.5) 9422(11.4)      NET  -2100 -2100      Weight (kg) 103.4 103.4 103.4 103.4 103.4 103.4       Recommendations:  1. Patient needs and requests: addressed    2. Diet: cardiac    3. Pending tests/procedures: none     4. Functional Level/Equipment: whellchair    5. Estimated Discharge Date: TBD Posted on Whiteboard in Patients Room: no     HEALS Safety Check    A safety check occurred in the patient's room between off going nurse and oncoming nurse listed above.     The safety check included the below items  Area Items   H  High Alert Medications - Verify all high alert medication drips (heparin, PCA, etc.)   E  Equipment - Suction is set up for ALL patients (with presley)  - Red plugs utilized for all equipment (IV pumps, etc.)  - WOWs wiped down at end of shift.  - Room stocked with oxygen, suction, and other unit-specific supplies   A  Alarms - Bed alarm is set for fall risk patients  - Ensure chair alarm is in place and activated if patient is up in a chair   L  Lines - Check IV for any infiltration  - Truong bag is empty if patient has a Truong   - Tubing and IV bags are labeled   S  Safety   - Room is clean, patient is clean, and equipment is clean. - Hallways are clear from equipment besides carts. - Fall bracelet on for fall risk patients  - Ensure room is clear and free of clutter  - Suction is set up for ALL patients (with presley)  - Hallways are clear from equipment besides carts.    - Isolation precautions followed, supplies available outside room, sign posted

## 2019-07-16 PROBLEM — R35.0 INCREASED URINARY FREQUENCY: Chronic | Status: ACTIVE | Noted: 2019-07-16

## 2019-07-16 LAB
INR PPP: 2.6 (ref 0.8–1.2)
PROTHROMBIN TIME: 28.2 SEC (ref 11.5–15.2)

## 2019-07-16 PROCEDURE — 74011250637 HC RX REV CODE- 250/637: Performed by: INTERNAL MEDICINE

## 2019-07-16 PROCEDURE — 65310000000 HC RM PRIVATE REHAB

## 2019-07-16 PROCEDURE — 36415 COLL VENOUS BLD VENIPUNCTURE: CPT

## 2019-07-16 PROCEDURE — 97530 THERAPEUTIC ACTIVITIES: CPT

## 2019-07-16 PROCEDURE — 85610 PROTHROMBIN TIME: CPT

## 2019-07-16 PROCEDURE — 97110 THERAPEUTIC EXERCISES: CPT

## 2019-07-16 PROCEDURE — 97535 SELF CARE MNGMENT TRAINING: CPT

## 2019-07-16 RX ORDER — WARFARIN 4 MG/1
4 TABLET ORAL ONCE
Status: COMPLETED | OUTPATIENT
Start: 2019-07-16 | End: 2019-07-16

## 2019-07-16 RX ORDER — TAMSULOSIN HYDROCHLORIDE 0.4 MG/1
0.4 CAPSULE ORAL DAILY
Status: DISCONTINUED | OUTPATIENT
Start: 2019-07-17 | End: 2019-07-19

## 2019-07-16 RX ADMIN — ACETAMINOPHEN 650 MG: 325 TABLET ORAL at 19:53

## 2019-07-16 RX ADMIN — HYDROCODONE BITARTRATE AND ACETAMINOPHEN 1 TABLET: 5; 325 TABLET ORAL at 08:43

## 2019-07-16 RX ADMIN — WARFARIN SODIUM 4 MG: 4 TABLET ORAL at 17:38

## 2019-07-16 RX ADMIN — HYDROCODONE BITARTRATE AND ACETAMINOPHEN 1 TABLET: 5; 325 TABLET ORAL at 17:38

## 2019-07-16 RX ADMIN — METHOCARBAMOL TABLETS 500 MG: 500 TABLET, COATED ORAL at 13:06

## 2019-07-16 RX ADMIN — METHOCARBAMOL TABLETS 500 MG: 500 TABLET, COATED ORAL at 06:14

## 2019-07-16 RX ADMIN — HYDROCODONE BITARTRATE AND ACETAMINOPHEN 1 TABLET: 5; 325 TABLET ORAL at 21:19

## 2019-07-16 RX ADMIN — CALCIUM CITRATE 200 MG (950 MG) TABLET 200 MG: at 17:38

## 2019-07-16 RX ADMIN — HYDROCODONE BITARTRATE AND ACETAMINOPHEN 1 TABLET: 5; 325 TABLET ORAL at 13:06

## 2019-07-16 RX ADMIN — FERROUS SULFATE TAB 325 MG (65 MG ELEMENTAL FE) 325 MG: 325 (65 FE) TAB at 08:43

## 2019-07-16 RX ADMIN — SIMVASTATIN 10 MG: 10 TABLET, FILM COATED ORAL at 21:18

## 2019-07-16 RX ADMIN — CALCIUM CITRATE 200 MG (950 MG) TABLET 200 MG: at 08:43

## 2019-07-16 RX ADMIN — Medication 5000 UNITS: at 08:43

## 2019-07-16 RX ADMIN — Medication 250 MG: at 08:43

## 2019-07-16 RX ADMIN — METHOCARBAMOL TABLETS 500 MG: 500 TABLET, COATED ORAL at 21:18

## 2019-07-16 NOTE — PROGRESS NOTES
Sw spoke with pt and his spouse regarding dc needs. Pt states preference for [facility] noting that he has used them in the past and been very pleased. Pt notes preference for [facility] - PT if available. Pt states that he has a rolling walker available to him. Pt states understanding and consents to sending orders to [employer] for a wheelchair, cushion and 32\" transfer board. Pt and wife state understanding that a bedside commode is not covered and express intent to purchase. Sw provided community referrals for private purchase. Pt states that he is aware that a ramp is recommended to his daughter's home with steps. Pt maintains that he does not want to modify his daughters home and expresses intent to stay at the [address] [place] [address] by [facility].     Pt states that he would like to fill his medications at [facility] on [address]. Sw will follow.

## 2019-07-16 NOTE — PROGRESS NOTES
Problem: Mobility Impaired (Adult and Pediatric)  Goal: *Acute Goals and Plan of Care (Insert Text)  Description  Physical Therapy Goals  Short Term Goals  Initiated 7/10/2019 and to be accomplished within 7 day(s) (7/17/2019)  1. Patient will move from supine to sit and sit to supine  in bed with minimal assistance/contact guard assist.     2.  Patient will transfer from bed to chair and chair to bed with minimal assistance/contact guard assist using the least restrictive device. 3.  Patient will perform sit to stand with minimal assistance/contact guard assist.  4.  Patient will ambulate with moderate assistance  for 10 feet with the least restrictive device. Long Term Goals  Initiated 7/10/2019 and to be accomplished within 2-3 weeks (7/24/2019-7/31/2019)  1. Patient will move from supine to sit and sit to supine , scoot up and down and roll side to side in bed with modified independence. 2.  Patient will transfer from bed to chair and chair to bed with supervision/set-up using the least restrictive device. 3.  Patient will perform sit to stand with supervision/set-up. 4.  Patient will ambulate with supervision/set-up for 50 feet with RW maintaining NWB left LE. 5.  Patient will propel w/c over level surfaces greater than 150 feet and over entryways, thresholds, and ramps with modified independence. Outcome: Progressing Towards Goal  PHYSICAL THERAPY TREATMENT    Patient: Renetta Hernandez (80 y.o. male)  Date: 7/16/2019  Diagnosis: Status post open reduction with internal fixation of fracture [Z96.7, Z87.81] Status post open reduction with internal fixation of fracture  Precautions: Fall Risk Precautions, NWB Left LE, THP Left LE   Chart, physical therapy assessment, plan of care and goals were reviewed. Time In: 1200  Time Out: 1230  Patient Seen For: Balance activities; Patient education;Transfer training; Wheelchair mobility  Time In: 1330  Time Out: 1430  Patient Seen For: Balance activities; Patient education;Transfer training; Therapeutic exercise  Pain:  Pt pain was reported as 5/10 pre-treatment. Pt pain was reported as 5/10 post-treatment. Intervention: Pt reports, \"it's not so bad,\" during first treatment session. Pt reports increased pain during second treatment session noting he felt a pull on his left hip with one stand step transfer during the first treatment session reporting pain is a 6-7/10 and that he received pain medication in between treatment sessions. Patient identified with name and : Yes    SUBJECTIVE:     Pt is pleasant and cooperative but appears fatigued during first treatment session. Pt states, \"thank you for reminding me,\" re: managing/positioning left LE to maintain weight bearing precautions consistently with functional transfers. During PM session, pt reports, \"I guess I'll have to take more than two steps at some point,\" re: transfer from w/c to recliner due to positioning of furniture in room. Pt agreeable to side step to recliner. OBJECTIVE DATA SUMMARY:   Objective:     BED/MAT MOBILITY Daily Assessment    Sit to Supine : 4 (Minimal assistance) for left LE management  Supine to Sit: 4 (Minimal assistance) for left LE management       TRANSFERS Daily Assessment    Other: stand step with RW  Transfer Assistance : 4 (Minimal assistance)  Sit to Stand Assistance: Minimal assistance   Pt maintains left LE NWB throughout sit to stand and with stepping with right LE during transfer with B UE weight bearing on RW. However, pt requires verbal reminders and intermittent min assist for left LE management with stand to sit to maintain NWB. However, pt demonstrates forward flexed and rounded shoulder posture with B UE weight bearing requiring visual demonstration, tactile cuing and max verbal reminders to attempt to maintain scap add and depression with B UE weight bearing.   Other: lateral transfer board transfer  Transfer Assistance : 4 (Minimal assistance)  During PM session, pt participated in lateral transfer training to educate pt re: safe transfer techniques when fatigued. Pt required intermittent min assist for safe left LE management and positioning, min verbal cues for maintaining head/hips relationship with lateral weight shift to right to avoid breaking total hip precautions, and min to mod assist for set up/positioning transfer board to initiate transfer. Pt performed lateral transfer w/c <> mat table across level surfaces. GAIT Daily Assessment    Pt participated in side stepping in room (approximately 3 feet) to transfer from w/c to recliner chair at end of second treatment session with pt requiring CGA to min assist for balance and min assist for safe RW management. Pt maintains left LE NWB demonstrating decreased right foot clearance and step length with rounded shoulder posture with B UE weight bearing on RW. BALANCE Daily Assessment    Sitting - Static: Good (unsupported)  Sitting - Dynamic: Good (unsupported)  Standing - Static: Poor  Standing - Dynamic : Impaired       WHEELCHAIR MOBILITY Daily Assessment    Functional Level: 5  Wheelchair Setup Assist Required : 3 (Moderate assistance)  Wheelchair Management: Manages left brake;Manages right brake;Manages right footrest       THERAPEUTIC EXERCISES Daily Assessment    Supine LE Strength Training:  3 Sets of 10 Repetitions:  Glut Sets x 5 \" holds  B Ankle Pumps  4# Right, AAROM Left SAQ       ASSESSMENT:  Pt is progressing well demonstrating ability to maintain left LE NWB throughout stand step transfers w/c <> mat table utilizing RW and progressing to sidestepping. However, pt requires verbal reminders to rest and demonstrates postural dysfunction and functional strength impairments limiting safety and independence with mobility. .  Progression toward goals:  ?      Improving appropriately and progressing toward goals  ? Improving slowly and progressing toward goals  ? Not making progress toward goals and plan of care will be adjusted     PLAN:  Patient continues to benefit from skilled intervention to address the above impairments. Continue treatment per established plan of care. Emphasize standing tolerance while maintaining neutral posture with functional strengthening for B LEs and UEs to demonstrate ability to consistently maintain left LE NWB. Discharge Recommendations:  Home Health  Further Equipment Recommendations for Discharge:  gait belt, wheelchair 18 inch and ramp (pt owns RW)     Estimated LOS: 7/23/2019    Activity Tolerance:   Good  Please refer to the flowsheet for vital signs taken during this treatment. After treatment:   Patient left self propelling w/c from gym to dining room following first treatment session.       Boom Wu PT, DPT  7/16/2019

## 2019-07-16 NOTE — PROGRESS NOTES
[facility] PHYSICAL REHABILITATION  [address] [place] [address], [address]     INPATIENT REHABILITATION  DAILY PROGRESS NOTE     Date: 7/16/2019    Name: Annmarie Askew. Age / Sex: 80 y.o. / male   CSN: [id number] MRN: [id number]   Admit Date: 7/9/2019 Length of Stay: 7 days     Primary Rehab Diagnosis: Impaired Mobility and ADLs secondary to:  1. S/P Open reduction internal fixation of displaced subtrochanteric periprosthetic fracture around internal prosthetic left hip joint (6/25/2019 - Dr. Scot Bearden)  2. History of displaced subtrochanteric periprosthetic fracture of left femur around internal prosthetic left hip joint; S/P Left total hip replacement (2/18/2019 - Dr. Scot Bearden)      Subjective:     Patient seen and examined. Blood pressure controlled. Patient's Complaint:   Urinary problems (increased urinary frequency)    Pain Control: ongoing significant pain in which is stable and controlled by current meds      Objective:     Vital Signs:  Patient Vitals for the past 24 hrs:   BP Temp Pulse Resp SpO2   07/16/19 0800 131/73 98.1 °F (36.7 °C) 66 18 97 %   07/15/19 2121 124/69 97.7 °F (36.5 °C) 70 18 97 %   07/15/19 1556 119/71 98.1 °F (36.7 °C) 77 18 96 %        Physical Examination:  GENERAL SURVEY: Patient is awake, alert, oriented x 3, sitting comfortably on the chair, not in acute respiratory distress.   HEENT: pale palpebral conjunctivae, anicteric sclerae, no nasoaural discharge, moist oral mucosa  NECK: supple, no jugular venous distention, no palpable lymph nodes  CHEST/LUNGS: symmetrical chest expansion, good air entry, clear breath sounds  HEART: adynamic precordium, good S1 S2, no S3, regular rhythm, no murmurs  ABDOMEN: obese, bowel sounds appreciated, soft, non-tender  EXTREMITIES: pale nailbeds, no edema, full and equal pulses, no calf tenderness   NEUROLOGICAL EXAM: The patient is awake, alert and oriented x3, able to answer questions fairly appropriately, able to follow 1 and 2 step commands. Able to tell time from the wall clock. Cranial nerves II-XII are grossly intact. No gross sensory deficit. Motor strength is 4+/5 on BUE, 4 to 4+/5 on the RLE, 2-/5 on the LLE (except 3- on the left ankle).      Incision(s): healing well, clean, dry, and intact      Current Medications:  Current Facility-Administered Medications   Medication Dose Route Frequency    diclofenac (FLECTOR) 1.3 % transdermal patch 1 Patch  1 Patch TransDERmal Q12H    cholecalciferol (VITAMIN D3) capsule 5,000 Units  5,000 Units Oral DAILY    calcium citrate tablet 200 mg [elemental]  200 mg Oral BID    warfarin - physician to dose   Other Q24H    ferrous sulfate tablet 325 mg  1 Tab Oral DAILY WITH BREAKFAST    ascorbic acid (vitamin C) (VITAMIN C) tablet 250 mg  250 mg Oral DAILY WITH BREAKFAST    methocarbamol (ROBAXIN) tablet 500 mg  500 mg Oral Q8H    acetaminophen (TYLENOL) tablet 650 mg  650 mg Oral Q4H PRN    bisacodyl (DULCOLAX) tablet 10 mg  10 mg Oral Q48H PRN    HYDROcodone-acetaminophen (NORCO) 5-325 mg per tablet 1 Tab  1 Tab Oral Q4H PRN    simvastatin (ZOCOR) tablet 10 mg  10 mg Oral QHS       Allergies:  No Known Allergies      Functional Progress:    OCCUPATIONAL THERAPY    ON ADMISSION MOST RECENT   Eating  Functional Level: 5   Eating  Functional Level: 5     Grooming  Functional Level: 5   Grooming  Functional Level: 5     Bathing  Functional Level: 3(supine/ HOB raised)   Bathing  Functional Level: 3(supine/HOB EOB)     Upper Body Dressing  Functional Level: 5   Upper Body Dressing  Functional Level: 5     Lower Body Dressing  Functional Level: 3   Lower Body Dressing  Functional Level: 3     Toileting  Functional Level: 1   Toileting  Functional Level: 3     Toilet Transfers  Toilet Transfer Score: 3(using FWW)   Toilet Transfers  Toilet Transfer Score: 3     Tub /Shower Transfers  Tub/Shower Transfer Score: 0   Tub/Shower Transfers  Tub/Shower Transfer Score: 0 Legend:   7 - Independent   6 - Modified Independent   5 - Standby Assistance / Supervision / Set-up   4 - Minimum Assistance / Contact Guard Assistance   3 - Moderate Assistance   2 - Maximum Assistance   1 - Total Assistance / Dependent       Lab/Data Review:  Recent Results (from the past 24 hour(s))   PROTHROMBIN TIME + INR    Collection Time: 07/16/19  6:08 AM   Result Value Ref Range    Prothrombin time 28.2 (H) 11.5 - 15.2 sec    INR 2.6 (H) 0.8 - 1.2         Estimated Glomerular Filtration Rate:  On admission, estimated GFR based on a Creatinine of 0.89 mg/dl:              Using CKD-EPI = 78.5 mL/min/1.73m2              Using MDRD = 86.6 mL/min/1.73m2      Assessment:     Primary Rehabilitation Diagnosis  1. Impaired Mobility and ADLs  2. S/P Open reduction internal fixation of displaced subtrochanteric periprosthetic fracture around internal prosthetic left hip joint (6/25/2019 - Dr. Carlos Cartagena)  3.  History of displaced subtrochanteric periprosthetic fracture of left femur around internal prosthetic left hip joint; S/P Left total hip replacement (2/18/2019 - Dr. 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    Mild aortic stenosis I35.0    Acute blood loss as cause of postoperative anemia D62    Acute pulmonary embolism  I26.99    Anticoagulated on warfarin Z79.01    Primary osteoarthritis involving multiple joints M15.0    Gastroesophageal reflux disease K21.9    Dyslipidemia E78.5    Diverticulosis K57.90    History of deep vein thrombosis of lower extremity Z86.718    Sick sinus syndrome  I49.5    Benign prostatic hyperplasia with lower urinary tract symptoms N40.1    Tremor R25.1    Obesity, Class I, BMI 30-34.9 E66.9    Vitamin D deficiency E55.9    Essential tremor G25.0  History of pericarditis Z86.79        Plan:     1. Justification for continued stay: Good progression towards established rehabilitation goals. 2. Medical Issues being followed closely:    [x]  Fall and safety precautions     [x]  Wound Care     [x]  Bowel and Bladder Function     [x]  Fluid Electrolyte and Nutrition Balance     [x]  Pain Control      3. Issues that 24 hour rehabilitation nursing is following:    [x]  Fall and safety precautions     [x]  Wound Care     [x]  Bowel and Bladder Function     [x]  Fluid Electrolyte and Nutrition Balance     [x]  Pain Control      [x]  Assistance with and education on in-room safety with transfers to and from the bed, wheelchair, toilet and shower. 4. Acute rehabilitation plan of care:    [x]  Continue current care and rehab. [x]  Physical Therapy           [x]  Occupational Therapy           []  Speech Therapy     []  Hold Rehab until further notice     5. Medications:    [x]  MAR Reviewed     [x]  Continue Present Medications     6. DVT Prophylaxis:      []  Lovenox     []  Unfractionated Heparin     [x]  Coumadin     []  NOAC     []  WINDY Stockings     []  Sequential Compression Device     []  None     7. Orders:   > S/P Open reduction internal fixation of displaced subtrochanteric periprosthetic fracture around internal prosthetic left hip joint (6/25/2019 - Dr. Rafael Smith);  History of displaced subtrochanteric periprosthetic fracture of left femur around internal prosthetic left hip joint; S/P Left total hip replacement (2/18/2019 - Dr. Rafael Smith)              > Nonweightbearing on the left lower extremity              > Total hip precautions on left hip              > Staples were removed on 7/9/2019               > On 7/10/2019, started Calcium citrate 1 tab PO BID   > Continue Calcium citrate 1 tab PO BID     > Acute Postoperative Blood Loss Anemia              > Hgb/Hct (7/10/2019, on admission) = 8.8/28.0              > Anemia work-up showed serum iron 40, TIBC 237, iron % saturation 17, ferritin 568, reticulocyte count 5.0   > On 7/10/2019, started FeSO4 and Ascorbic acid   > Hgb/Hct (7/15/2019) = 9.3/29.5    > Continue:               > FeSO4 325 mg PO once daily with breakfast                > Ascorbic Acid 250 mg PO once daily with breakfast (to enhance the absorption of the FeSO4)      > Acute pulmonary embolism, anticoagulated on Warfarin              > Target INR = 2 to 3              > INR (7/16/2019) = 2.6              > Patient received Warfarin 4 mg PO last night               > Warfarin 4 mg PO tonight                > Sepsis due to Pseudomonas aeruginosa              > Blood culture (7/5/2019; 1 out of 2 sets) yielded growth of PSeudomonas aeruginosa (PANSENSITIVE)              > On 7/5/2019, patient was empirically started on Piperacillin-Tazobactam 4.5 grams IV q 6 hr and a single dose of Vancomycin IV was given              > On 7/9/2019, Piperacillin-Tazobactam IV was discontinued and patient was discharged from [facility] on oral Ciprofloxacin 500 mg PO q 12 hr x 4 days              > On 7/13/2019, patient had completed the recommended 4-day treatment course of Ciprofloxacin 500 mg PO q 12 hr      > Vitamin D Deficiency              > Vitamin D 25-Hydroxy (7/10/2019) = 18.6              > On 7/10/2019, patient was given Cholecalciferol 50,000 units PO x 1 dose               > On 7/11/2019, started Cholecalciferol 5,000 units PO once daily   > Continue Cholecalciferol 5,000 units PO once daily     > Increased urinary frequency   > Will start a trial of Tamsulosin 0.4 mg PO once daily    > Analgesia   > On 7/15/2019, added Diclofenac 1.3% transdermal patch, 1 patch on skin of left thigh q 12 hr   > Continue:               > Acetaminophen 650 mg PO q 4 hr PRN for pain level less than 5/10    > Diclofenac 1.3% transdermal patch, 1 patch on skin of left thigh q 12 hr               > Methocarbamol 500 mg PO q 8 hr                > Norco 5/325 1 tab PO q 4 hr PRN for pain level greater than 4/10      > Diet:              > Specifications: Low saturated fat              > Solids (consistency): Regular               > Liquids (consistency): Thin       8. Patient's progress in rehabilitation and medical issues discussed with the patient. All questions answered to the best of my ability. Care plan discussed with patient and nurse.       Signed:    Maddison Phoenix MD    July 16, 2019

## 2019-07-16 NOTE — PROGRESS NOTES
Problem: Self Care Deficits Care Plan (Adult)  Goal: *Therapy Goal (Edit Goal, Insert Text)  Description  Occupational Therapy Goals   Long Term Goals  Initiated 7/10/19 and to be accomplished within 2 week(s) 19  1. Pt will perform self-feeding with I.  2. Pt will perform grooming with S.  3. Pt will perform UB bathing with S.  4. Pt will perform LB bathing with S.  5. Pt will perform tub/shower transfer with S.   6. Pt will perform UB dressing with S.  7. Pt will perform LB dressing with S.  8. Pt will perform toileting task with [facility]. 9. Pt will perform toilet transfer with S.    Short Term Goals   Initiated 7/10/19 and to be accomplished within 7 day(s)  19  1. Pt will perform self-feeding with S.   2. Pt will perform grooming with S.  3. Pt will perform UB bathing with S.  4. Pt will perform LB bathing with [place]. 5. Pt will perform tub/shower transfer with [facility]. 6. Pt will perform UB dressing with S.  7. Pt will perform LB dressing with [place]. 8. Pt will perform toileting task with [place]. 9. Pt will perform toilet transfer with [facility]. Outcome: Progressing Towards Goal   OCCUPATIONAL THERAPY TREATMENT    Patient: Nena Garcia.   80 y.o. Patient identified with name and : yes    Date: 2019    First Tx Session  Time In: 0930  Time Out[de-identified] [id number]    Diagnosis: Status post open reduction with internal fixation of fracture [Z96.7, Z87.81]   Precautions:  NWB L LE , Total Hip Precautions L LE  Chart, occupational therapy assessment, plan of care, and goals were reviewed. Pain:  Pt reports 5-6/10 pain or discomfort prior to treatment. Pt reports 6-7/10 pain or discomfort post treatment. Intervention Provided: Pt provided with pain medicine 1 hr prior to tx. Pt provided with rest breaks and positioning at end of session to relieve L LE pain.       SUBJECTIVE:   Patient stated I definitely want her [spouse] to come to our next shower to watch again, because the time after that, it'll only be me and her.     OBJECTIVE DATA SUMMARY:   Pt approached for tx supine in bed with spouse present for ADL training. Pt performed bed mobility to sit EOB with Edyta for left LE . Pt performed EOB>w/c transfer with RW with CGA. Performed educated on good body mechanics and step sequencing for TTB with use of rolling walker. Pt performed w/c>TTB with RW with CGA and cues for left LE placement prior/ending transfer. Pt performed bathing seated on tub bench (see below). Pt's spouse educated on AE use and body mechanics in shower with spouse demo'ing good carryover and receptive to education. Pt performed dressing in w/c, w/ spouse educated on use of AE for dressing independence. Spouse assisted pt during 1x sit<>stand to esther LB clothing over hips w/ providing CGA and good cues. Pt performed 1x additional TTB transfer at end of session with spouse performing transfer to ensure safety/carryover w/ spouse providing CGA (requiring SBA from therapist) but good carryover of education/cues needed for pt. Pt/spouse and OT collaboration for spouse to be able to perform pt's bathing/dressing at w/c level at sink w/o nursing asst, per pt's request. Per observation, OT cleared spouse for performing ADL with pt with note written on board for staff communication and nursing notified. GROOMING Daily Assessment     Pt performed washing face with wash cloth with set-up of materials in shower. Pt required set-up supervision. UPPER BODY BATHING Daily Assessment    Upper Body Bathing  Bathing Assistance, Upper: 5 (Stand-by assistance)(Set-up)  Position Performed: Seated in chair; Other (comment)  Adaptive Equipment: Long handled sponge; Tub bench     LOWER BODY BATHING Daily Assessment    Lower Body Bathing  Bathing Assistance, Lower : 5 (Stand-by assistance)  Adaptive Equipment: Long handled sponge  Position Performed: Seated in chair  Adaptive Equipment: Tub bench  Comments: Pt performed shifting weight side:side with maintaining L LE in neutral position to wash buttocks region      UPPER BODY DRESSING Daily Assessment    Upper Body Dressing   Dressing Assistance : 5 (Supervision)(Set-up)  Comments: Performed seated in w/c with set-up of shirt in hand. LOWER BODY DRESSING Daily Assessment    Lower Body Dressing   Dressing Assistance : 4 (Contact guard assistance)  Leg Crossed Method Used: No  Position Performed: Seated in chair;Standing  Adaptive Equipment Used: Grab bar;Long handled shoe horn;Reacher;Sock aid; Walker  Comments: Pt doffed LB clothing at [address] [place] [address] level with F- balance and use of reacher. Pt doffed (B) socks with reacher seated on TTB. Pt donned (B) socks with modA x left foot and CGA x right foot 2/2 to narrow sock aide (pt would benefit from bariatric AE). Pt donned boxer/shorts with use of reacher with CGA in stand. MOBILITY/TRANSFERS Daily Assessment    Functional Transfers  Tub or Shower Type: Shower  Amount of Assistance Required: 4 (Contact guard assistance)  Adaptive Equipment: Grab bars; Tub transfer bench;Walker (comment)(RW)       ASSESSMENT:  Pt's spouse participating in ADL this AM for carryover of AE training and maintaining left LE precautions during ADL/functional transfers. Pt's spouse receptive and demo'ed good participation t/o session, though prefers to assist pt with dressing as opposed to have pt be as (I) as possible. Spouse receptive to allowing pt to learn and be as (I) as possible in ARU. Pt requiring cues to clear left LE prior to stand>sit transfers (pt likes to reach back and then extend leg forward, placing variable weight onto LE ). Pt educated on proper sequencing w/ spouse provided with proper cues to give patient and reasoning for safety w/ good reception. Pt would benefit from use of bariatric sock aide to increase LB dressing independence, and continued repetition of functional transfers (toilet/shower) for good carryover. Progression toward goals:  ? Improving appropriately and progressing toward goals  ? Improving slowly and progressing toward goals  ? Not making progress toward goals and plan of care will be adjusted     PLAN:  Patient continues to benefit from skilled intervention to address the above impairments. Continue treatment per established plan of care. Discharge Recommendations:  Home Health  Further Equipment Recommendations for Discharge:  3:1  commode      Activity Tolerance:  Fair, pt visibly and reportedly fatigued at end of session performing 2x TTB transfers       Estimated LOS: 7/23/2019    Please refer to the flowsheet for vital signs taken during this treatment. After treatment:   ?  Patient left in no apparent distress sitting up in wheel chair   ? Call bell left within reach  ? Caregiver present  ? Chair alarm activated    COMMUNICATION/EDUCATION:   ? Home safety education was provided and the patient/caregiver indicated understanding. ? Patient/family have participated as able in goal setting and plan of care. ? Patient/family agree to work toward stated goals and plan of care. ? Patient understands intent and goals of therapy, but is neutral about his/her participation. ? Patient is unable to participate in goal setting and plan of care.       Chanelle Berg OTR/L

## 2019-07-16 NOTE — PROGRESS NOTES
SHIFT CHANGE NOTE FOR [facility]    Bedside and Verbal shift change report given to Hernán Milian LPN (oncoming nurse) by Yomaira Almendarez RN (offgoing nurse). Report included the following information SBAR, Kardex, MAR and Recent Results. Situation:   Code Status: Full Code   Reason for Admission: S/P ORIF  Hospital Day: 7   Problem List:   Hospital Problems  Date Reviewed: 7/16/2019          Codes Class Noted POA    Increased urinary frequency (Chronic) ICD-10-CM: R35.0  ICD-9-CM: 788.41  7/16/2019 Yes        Vitamin D deficiency (Chronic) ICD-10-CM: E55.9  ICD-9-CM: 268.9  7/10/2019 Yes    Overview Signed 7/10/2019 11:23 AM by Cal Hashimoto, MD     Vitamin D 25-Hydroxy (7/10/2019) = 18.6              Sepsis due to Pseudomonas species [facility]) ICD-10-CM: A41.52  ICD-9-CM: 038.43, 995.91  7/5/2019 Yes    Overview Addendum 7/6/2019  8:16 PM by Harleen Newby MD     7/5/19      80 y.o. male recently admitted for femur fracturesent from skilled nursing facility for fever to 102.9. Patient was recently seen at [facility] for hip replacement status post fall. Patient endorses retrosternal chest pain that started upon waking this morning has been persistent since without propagating or palliating factors. Patient also endorses increased bilateral lower extremity swelling and difficulty breathing. WBC 29.2  P-87%,  U/A neg  CT  Heterogeneous collection posterior to the greater trochanter, most consistent with a postoperative hematoma. No rim enhancement to suggest abscess  CXR Small bilateral pleural effusions, left greater than right. Mild pulmonary edema.  No consolidation  7/6/19 [facility]TH[facility]) Benign appearing left hip and femur now 11 days s/p ORIF periprosthetic hip fracture                         Impaired mobility and ADLs ICD-10-CM: Z74.09  ICD-9-CM: 799.89  7/5/2019 Yes        Acute pulmonary embolism ([facility] [address] ICD-10-CM: I26.99  ICD-9-CM: 415.19  7/1/2019 Yes    Overview Signed 7/9/2019  5:07 PM by Murtaza Leblanc MD KRIS     Pulmonary emboli at the periphery of the right main pulmonary artery extending into the middle and lower lobe branches             Anticoagulated on warfarin (Chronic) ICD-10-CM: Z79.01  ICD-9-CM: V58.61  7/1/2019 Yes        * (Principal) Status post open reduction with internal fixation of fracture ICD-10-CM: Z96.7, Z87.81  ICD-9-CM: V45.89, V15.51  6/25/2019 Yes    Overview Signed 7/9/2019  4:52 PM by Sherry North MD     S/P Open reduction internal fixation of displaced subtrochanteric periprosthetic fracture around internal prosthetic left hip joint (6/25/2019 - Dr. Cristal Shelby)             Acute blood loss as cause of postoperative anemia ICD-10-CM: D62  ICD-9-CM: 285.1  6/25/2019 Yes        Periprosthetic fracture around internal prosthetic left hip joint ([facility] ICD-10-CM: M97. 02XA  ICD-9-CM: 996.44, V43.64  6/24/2019 Yes    Overview Signed 7/9/2019  5:04 PM by Sherry North MD     S/P Open reduction internal fixation of displaced subtrochanteric periprosthetic fracture around internal prosthetic left hip joint (6/25/2019 - Dr. Cristal Shelby)             Displaced subtrochanteric fracture of left femur [facility]) ICD-10-CM: B08.56GY  ICD-9-CM: 820.22  6/24/2019 Yes    Overview Signed 7/9/2019  5:02 PM by Sherry North MD     S/P Open reduction internal fixation of displaced subtrochanteric periprosthetic fracture around internal prosthetic left hip joint (6/25/2019 - Dr. Cristal Shelby)             History of total left hip arthroplasty ICD-10-CM: B47.396  ICD-9-CM: V43.64  2/18/2019 Yes    Overview Addendum 7/9/2019  5:01 PM by Sherry North MD     S/P Left total hip replacement (2/18/2019 - Dr. Cristal Shelby)                   Background:   Past Medical History:   Past Medical History:   Diagnosis Date    Acute blood loss as cause of postoperative anemia 6/25/2019    Acute pulmonary embolism ([facility] [address] 7/1/2019    Pulmonary emboli at the periphery of the right main pulmonary artery extending into the middle and lower lobe branches    Anticoagulated on warfarin 7/1/2019    Benign prostatic hyperplasia with lower urinary tract symptoms     Demand ischemia of myocardium ([facility] [address] 7/6/2019 4/16/18 NST EF 59% , small, reversible apical defect 7/2/19 ECHO EF 68%, LVH 14/16, LAE, RVE, ANDREY 1.4cm2 with 12mm mean gradient    Diverticulosis     Dyslipidemia     Essential tremor     Gastroesophageal reflux disease     History of pericarditis     Obesity, Class I, BMI 30-34.9     Paroxysmal atrial fibrillation ([address]     CVAL Maddy Saleh FNP 2008  AF ablation 9/11/13 EPS with isolation of RSPV and LIPV 6/4/18 TEDDY  EF 55%, 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 male recently admitted for femur fracturesent from skilled nursing facility for fever to 102.9. Patient was recently seen at [facility] for hip replacement status post fall. Patient endorses retrosternal chest pain that started upon waking this morning has been persistent since without propagating or palliating factors.   Patient also endorses increased bilateral lower extremity s    Sick sinus syndrome ([address]     Vitamin D deficiency 7/10/2019    Vitamin D 25-Hydroxy (7/10/2019) = 18.6       Patient taking anticoagulants yes Coumadin   Patient has a defibrillator: no     Assessment:   Changes in Assessment throughout shift: no     Patient has central line: no Reasons if yes: na  Insertion date:na Last dressing date:na   Patient has Truong Cath: no Reasons if yes: n/a   Insertion date:n/a     Last Vitals:     Vitals:    07/15/19 1556 07/15/19 2121 07/16/19 0800 07/16/19 1600   BP: 119/71 124/69 131/73 120/67   Pulse: 77 70 66 71   Resp: 18 18 18 18   Temp: 98.1 °F (36.7 °C) 97.7 °F (36.5 °C) 98.1 °F (36.7 °C) 97.4 °F (36.3 °C)   SpO2: 96% 97% 97% 98%   Weight: Height:            PAIN    Pain Assessment    Pain Intensity 1: 3 (07/16/19 1843) Pain Intensity 1: 2 (12/29/14 1105)    Pain Location 1: Hip Pain Location 1: Abdomen    Pain Intervention(s) 1: Medication (see MAR) Pain Intervention(s) 1: Medication (see MAR)  Patient Stated Pain Goal: 0 Patient Stated Pain Goal: 0  o Intervention effective: yes    o Other actions taken for pain: no     Skin Assessment  Skin color Skin Color: Appropriate for ethnicity  Condition/Temperature Skin Condition/Temp: Dry, Warm  Integrity Skin Integrity: Incision (comment)  Turgor Turgor: Non-tenting  Weekly Pressure Ulcer Documentation  Pressure  Injury Documentation: No Pressure Injury Noted-Pressure Ulcer Prevention Initiated  Wound Prevention & Protection Methods  Orientation of wound Orientation of Wound Prevention: Posterior  Location of Prevention Location of Wound Prevention: Sacrum/Coccyx  Dressing Present Dressing Present : No  Dressing Status    Wound Offloading Wound Offloading (Prevention Methods): Bed, pressure redistribution/air, Repositioning     INTAKE/OUPUT  Date 07/15/19 0700 - 07/16/19 0659 07/16/19 0700 - 07/17/19 0659   Shift 5334-5698 0155-4539 24 Hour Total 6280-9773 7513-5978 24 Hour Total   INTAKE   P.O.    1800  1800     P. O.    1800  1800   Shift Total(mL/kg)    1800(17.4)  1800(17.4)   OUTPUT   Urine(mL/kg/hr)  2100(1.7) 2100(0.8)        Urine Voided  2100 2100        Urine Occurrence(s) 3 x 7 x 10 x 3 x  3 x   Emesis/NG output           Emesis Occurrence(s)    0 x  0 x   Stool           Stool Occurrence(s) 0 x 0 x 0 x 0 x  0 x   Shift Total(mL/kg)  2100(20.3) 2100(20.3)      NET  -2100 -2100 1800  1800   Weight (kg) 103.4 103.4 103.4 103.4 103.4 103.4       Recommendations:  1. Patient needs and requests: addressed    2. Diet: cardiac    3. Pending tests/procedures: none     4. Functional Level/Equipment: whellchair    5.  Estimated Discharge Date: TBD Posted on Whiteboard in Patients Room: [facility] Safety Check    A safety check occurred in the patient's room between off going nurse and oncoming nurse listed above. The safety check included the below items  Area Items   H  High Alert Medications - Verify all high alert medication drips (heparin, PCA, etc.)   E  Equipment - Suction is set up for ALL patients (with presley)  - Red plugs utilized for all equipment (IV pumps, etc.)  - WOWs wiped down at end of shift.  - Room stocked with oxygen, suction, and other unit-specific supplies   A  Alarms - Bed alarm is set for fall risk patients  - Ensure chair alarm is in place and activated if patient is up in a chair   L  Lines - Check IV for any infiltration  - Truong bag is empty if patient has a Truong   - Tubing and IV bags are labeled   S  Safety   - Room is clean, patient is clean, and equipment is clean. - Hallways are clear from equipment besides carts. - Fall bracelet on for fall risk patients  - Ensure room is clear and free of clutter  - Suction is set up for ALL patients (with presley)  - Hallways are clear from equipment besides carts.    - Isolation precautions followed, supplies available outside room, sign posted

## 2019-07-16 NOTE — ROUTINE PROCESS
0800 Pt. Awake sitting up in bed no change in assessment no signs of distress pt. Stated to be feeling fine. Incision to left hip dry and intact. 0930 Pt. Sitting up in chair eating breakfast.  
1200 pt. With therapy. 1330 able to transfer by wheelchair. 1500 no change in assessment. 1800 Pt. Siting up in chair eating dinner. Family in room.

## 2019-07-17 LAB
INR PPP: 2.7 (ref 0.8–1.2)
PROTHROMBIN TIME: 28.6 SEC (ref 11.5–15.2)

## 2019-07-17 PROCEDURE — 36415 COLL VENOUS BLD VENIPUNCTURE: CPT

## 2019-07-17 PROCEDURE — 97530 THERAPEUTIC ACTIVITIES: CPT

## 2019-07-17 PROCEDURE — 74011250637 HC RX REV CODE- 250/637: Performed by: INTERNAL MEDICINE

## 2019-07-17 PROCEDURE — 97542 WHEELCHAIR MNGMENT TRAINING: CPT

## 2019-07-17 PROCEDURE — 85610 PROTHROMBIN TIME: CPT

## 2019-07-17 PROCEDURE — 97535 SELF CARE MNGMENT TRAINING: CPT

## 2019-07-17 PROCEDURE — 65310000000 HC RM PRIVATE REHAB

## 2019-07-17 RX ORDER — DIPHENHYDRAMINE HCL 25 MG
25 CAPSULE ORAL
Status: DISCONTINUED | OUTPATIENT
Start: 2019-07-17 | End: 2019-07-19

## 2019-07-17 RX ORDER — WARFARIN 2.5 MG/1
2.5 TABLET ORAL ONCE
Status: COMPLETED | OUTPATIENT
Start: 2019-07-17 | End: 2019-07-17

## 2019-07-17 RX ADMIN — Medication 5000 UNITS: at 08:39

## 2019-07-17 RX ADMIN — CALCIUM CITRATE 200 MG (950 MG) TABLET 200 MG: at 08:40

## 2019-07-17 RX ADMIN — FERROUS SULFATE TAB 325 MG (65 MG ELEMENTAL FE) 325 MG: 325 (65 FE) TAB at 08:39

## 2019-07-17 RX ADMIN — METHOCARBAMOL TABLETS 500 MG: 500 TABLET, COATED ORAL at 20:37

## 2019-07-17 RX ADMIN — METHOCARBAMOL TABLETS 500 MG: 500 TABLET, COATED ORAL at 05:54

## 2019-07-17 RX ADMIN — Medication 250 MG: at 08:40

## 2019-07-17 RX ADMIN — DIPHENHYDRAMINE HYDROCHLORIDE 25 MG: 25 CAPSULE ORAL at 20:32

## 2019-07-17 RX ADMIN — METHOCARBAMOL TABLETS 500 MG: 500 TABLET, COATED ORAL at 13:24

## 2019-07-17 RX ADMIN — HYDROCODONE BITARTRATE AND ACETAMINOPHEN 1 TABLET: 5; 325 TABLET ORAL at 13:24

## 2019-07-17 RX ADMIN — TAMSULOSIN HYDROCHLORIDE 0.4 MG: 0.4 CAPSULE ORAL at 08:40

## 2019-07-17 RX ADMIN — WARFARIN SODIUM 2.5 MG: 2.5 TABLET ORAL at 18:14

## 2019-07-17 RX ADMIN — SIMVASTATIN 10 MG: 10 TABLET, FILM COATED ORAL at 20:32

## 2019-07-17 RX ADMIN — HYDROCODONE BITARTRATE AND ACETAMINOPHEN 1 TABLET: 5; 325 TABLET ORAL at 08:41

## 2019-07-17 RX ADMIN — HYDROCODONE BITARTRATE AND ACETAMINOPHEN 1 TABLET: 5; 325 TABLET ORAL at 18:14

## 2019-07-17 RX ADMIN — CALCIUM CITRATE 200 MG (950 MG) TABLET 200 MG: at 18:13

## 2019-07-17 NOTE — PROGRESS NOTES
Problem: Self Care Deficits Care Plan (Adult)  Goal: *Therapy Goal (Edit Goal, Insert Text)  Description  Occupational Therapy Goals   Short Term Goals= Long Term Goals  Initiated 7/10/19 and to be accomplished within 2 week(s) (To be accomplished by discharge 7/23/2019)  1. Pt will perform self-feeding with I. ()  2. Pt will perform grooming with S. (, upgrade to MI)  3. Pt will perform UB bathing with S. (, upgrade to MI)  4. Pt will perform LB bathing with S.  5. Pt will perform tub/shower transfer with S.   6. Pt will perform UB dressing with S. (, upgrade to MI)  7. Pt will perform LB dressing with S.  8. Pt will perform toileting task with [facility]. (, upgrade to SB)  9. Pt will perform toilet transfer with S.    Short Term Goals   Initiated 7/10/19 and to be accomplished within 7 day(s) (Updated 7/17/2019)  1. Pt will perform self-feeding with S. ()  2. Pt will perform grooming with S. ()  3. Pt will perform UB bathing with S. ()  4. Pt will perform LB bathing with [place]. ()  5. Pt will perform tub/shower transfer with [facility]. ()  6. Pt will perform UB dressing with S. ()  7. Pt will perform LB dressing with [place]. ()  8. Pt will perform toileting task with [place]. ()  9. Pt will perform toilet transfer with [facility]. ()        Outcome: Progressing Towards Goal  OT WEEKLY PROGRESS NOTE  Patient Name:Alessandro Bernal. Time Spent With Patient  Time In: 1130  Time Out: 1235  Patient Seen For[de-identified] AM;ADLs;Other (see progress notes)  Time In: 1330  Time Out: 1400    Medical Diagnosis:  Status post open reduction with internal fixation of fracture [Z96.7, Z87.81] Status post open reduction with internal fixation of fracture     Pain at start of tx: 5-6/10 pain or discomfort. Pain at stop of tx: 6-7/10 pain or discomfort as well as headache. *Pt provided with pain patch for left LE which pt reporting has \"eased\" his pain significantly as opposed to oral medication 1st tx.  Pt provided with pain medicine prior to 2nd tx. Nursing notified of pt's headache and level of pain. Patient identified with name and : yes  Subjective: \"My wife is checking out the hotel now, can I call her to remind her to measure the bed for you and the other girl [PT]? \"         Objective: Pt approached for 1st tx seated in w/c in therapy gym, pt agreeable to tx. Pt self propelled w/c to room to perform functional training repetition practice. Pt provided/given education with bariatric wide sock aide and leg  to increase (I) in ADLs and functional transfer mobility. Pt ended 1st session in dining room for lunch with therapy staff present. Pt approached for 2nd tx seated in w/c, present in therapy gym. Pt disclosing to OT that he has not been sleeping well, pt educated on discussing concern with physician during roundings this PM w/ pt demo'ing understanding. Pt performed standing theract task placing connect 4 in standing at RW level and ped-alert boot donned for weight bearing auditory feedback. Pt stood for 5:30 seconds with F- balance. Pt appeared flushed and requesting to sit, vitals taken (see below), w/ pt provided with fluids. Pt ended game seated in w/c with 3# [place] donned for UE strengthening. Pt performed theract to increase standing endurance/tolerance, Right LE strengthening, postural support strengthening, and static standing balance necessary for ADL independence. At end of session, pt reporting he had increased level of fatigue this PM, requesting to end session early. Pt receptive to finishing remainder of session and allowing for OT to share pt's level of fatigue with PT for hand off and staff communication.      Patient Vitals for the past 4 hrs:   Pulse BP SpO2   19 1351 79 130/74 98 %         Outcome Measures: FIM     [facility]: [facility]       COGNITION/PERCEPTION Initial Assessment Weekly Progress Assessment 2019   Premorbid Reading Status       Premorbid Writing Status       Arousal/Alertness Orientation Level Oriented X4  Oriented x 4    Visual Fields       Praxis    Intact, pt presents with intention tremors (B) hands that do not interfere with ADL participation    Body Scheme    Appears intact   COMPREHENSION MODE Initial Assessment Weekly Progress Assessment 7/17/2019   Primary Mode of Comprehension Auditory Auditory   Hearing Aide None     Corrective Lenses       Score 5 7     EXPRESSION Initial Assessment Weekly Progress Assessment 7/17/2019   Primary Mode of Expression Verbal Verbal   Score 5 7   Comments         SOCIAL INTERACTION/ PRAGMATICS Initial Assessment Weekly Progress Assessment 7/17/2019   Score 5 5   Comments         PROBLEM SOLVING Initial Assessment Weekly Progress Assessment 7/17/2019   Score 5 6   Comments         MEMORY Initial Assessment Weekly Progress Assessment 7/17/2019   Score 5 5   Comments    Pt requiring supervision and variable cues for clearing L LE management prior/after sit<>stand transfers      EATING Initial Assessment Weekly Progress Assessment 7/17/2019   Functional Level 5 Feeding/Eating  Feeding/Eating Assistance: 7 (Independent)  Comments: Seated in w/c in dining room pt able to open/manip containers I'ly and self feed w/o asst    Comments         GROOMING Initial Assessment Weekly Progress Assessment 7/17/2019   Functional Level 5 Grooming  Grooming Assistance : 5 (Supervision)(S/U)  Comments: Per last assessment 7/16 pt performed washing face with set-up of wash cloth in hand    Tasks completed by patient       Comments         BATHING Initial Assessment Weekly Progress Assessment 7/17/2019   Functional Level 3(supine/ HOB raised) Functional Level: 5  Upper Body Bathing  Bathing Assistance, Upper: 5 (Stand-by assistance)(Set-up. Per last assessment 7/16)  Position Performed: Seated in chair; Other (comment)(TTB)  Adaptive Equipment: Long handled sponge; Tub bench  Lower Body Bathing  Bathing Assistance, Lower : 5 (Stand-by assistance)(Per last assessment 7/16)  Adaptive Equipment: Long handled sponge  Position Performed: Seated in chair  Adaptive Equipment: Tub bench  Comments: Pt performed shifting weight side:side with maintaining L LE in neutral position to wash buttocks region    Body parts patient bathed Abdomen, Arm, left, Arm, right, Buttocks, Chest, Kerrie area, Thigh, left, Thigh, right     Comments assist with lower leg and feet       TUB/SHOWER TRANSFER INDEPENDENCE Initial Assessment Weekly Progress Assessment 7/17/2019   Score 0 Functional Transfers  Toilet Transfer : Stand pivot transfer with walker  Amount of Assistance Required: 5 (stand-by assistance)  Tub or Shower Type: Shower  Amount of Assistance Required: 4 (Contact guard assistance)  Adaptive Equipment: Bedside commode;Grab bars; Tub transfer bench;Walker (comment)(RW)   Comments    Pt utilized leg  to (A) with L LE management getting \"out\"of tub, maneuvering to edge of tub prior to transfer to RW. Pt would benefit from repetition practice training w/ use of leg . UPPER BODY DRESSING/UNDRESSING Initial Assessment Weekly Progress Assessment 7/17/2019   Functional Level 5 Upper Body Dressing   Dressing Assistance : 5 (Supervision)(Set-up, per last assessment 7/16)  Comments: Performed seated in w/c with set-up of shirt in hand to perform dressing    Items applied/Steps completed Pullover (4 steps)     Comments seated e       LOWER BODY DRESSING/UNDRESSING Initial Assessment Weekly Progress Assessment 7/17/2019   Functional Level 3 Lower Body Dressing   Dressing Assistance : 4 (Contact guard assistance)  Leg Crossed Method Used: No  Position Performed: Seated in chair;Standing  Adaptive Equipment Used: Grab bar; Reacher;Sock aid; Walker  Comments: Pt doffed/donned LB clothing on 7/16 with use of AE and CGA overall. Pt provided with wide bariatric sock aide today and donned (B) socks with SBA overall. Pt doffed (B) socks with use of reacher with MI.     Items applied/Steps completed Elastic waist pants (3 steps)  Elastic waist pants, R/L socks   Comments thread BLE and assit to pull up to thigh       TOILETING Initial Assessment Weekly Progress Assessment 7/17/2019   Functional Level 1 Toileting  Toileting Assistance (FIM Score): 5 (Supervision)(Close SBA, Simulated dry toileting on 3:1)  Cues: Verbal cues provided  Adaptive Equipment: Grab bars; Alan Sos; Other (comment)(3:1)   Comments         TOILET TRANSFER INDEPENDENCE Initial Assessment Weekly Progress Assessment 7/17/2019   Transfer score 3(using FWW) Functional Transfers  Toilet Transfer : Stand pivot transfer with walker  Amount of Assistance Required: 5 (stand-by assistance)  Tub or Shower Type: Shower  Amount of Assistance Required: 4 (Contact guard assistance)  Adaptive Equipment: Bedside commode;Grab bars; Tub transfer bench;Walker (comment)(RW)   Comments                  ASSESSMENT:  Pt demo'ing good progress towards POC as evidenced by meeting 9/9x STGs and 5/9x LTGs within this progress period. Pt continuing to progress in functional transfer and LB dressing independence with varying levels of asst needed depending on pt's level of fatigue. Pt would benefit from continued shower transfer with use of ped-alert donned for auditory feedback for NWB status for left LE and increasing standing activity tolerance for (I) in ADLs. Progression toward goals:  [x]          Improving appropriately and progressing toward goals  []          Improving slowly and progressing toward goals  []          Not making progress toward goals and plan of care will be adjusted     PLAN:  Patient continues to benefit from skilled intervention to address the above impairments. Continue treatment per established plan of care. Discharge Recommendations:  Home Health  Further Equipment Recommendations for Discharge:  bedside commode      Please refer to the flow sheet for vital signs taken during this treatment.   After treatment:   [x]  Patient left in no apparent distress sitting up in wheel chair for PT hand off     COMMUNICATION/EDUCATION:   [x] Home safety education was provided and the patient/caregiver indicated understanding. [x] Patient/family have participated as able in goal setting and plan of care. [x] Patient/family agree to work toward stated goals and plan of care. [] Patient understands intent and goals of therapy, but is neutral about his/her participation. [] Patient is unable to participate in goal setting and plan of care. Plan of Care: Please see Care Plan for updated STG/LTGs.    Family Training: Has taken place (ongoing)  Estimated LOS: 7/23/2019    NOAH Nolan  7/17/2019

## 2019-07-17 NOTE — INTERDISCIPLINARY ROUNDS
[address]  [address] [place] [address], [address]    INPATIENT REHABILITATION  PRE-TEAM CONFERENCE SUMMARY     Date of Conference: 7/18/19    Patient Information:        Name: Annmarie Askew. Age / Sex: 80 y.o. / male   CSN: [id number] MRN: [id number]   Admit Date: 7/9/2019 Length of Stay: 8 days     Primary Rehabilitation Diagnosis  1. Impaired Mobility and ADLs  2. S/P Open reduction internal fixation of displaced subtrochanteric periprosthetic fracture around internal prosthetic left hip joint (6/25/2019 - Dr. Scot Bearden)  3.  History of displaced subtrochanteric periprosthetic fracture of left femur around internal prosthetic left hip joint; S/P Left total hip replacement (2/18/2019 - Dr. 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    Mild aortic stenosis I35.0    Acute blood loss as cause of postoperative anemia D62    Acute pulmonary embolism  I26.99    Anticoagulated on warfarin Z79.01    Primary osteoarthritis involving multiple joints M15.0    Gastroesophageal reflux disease K21.9    Dyslipidemia E78.5    Diverticulosis K57.90    History of deep vein thrombosis of lower extremity Z86.718    Sick sinus syndrome  I49.5    Benign prostatic hyperplasia with lower urinary tract symptoms N40.1    Tremor R25.1    Obesity, Class I, BMI 30-34.9 E66.9    Vitamin D deficiency E55.9    Essential tremor G25.0    History of pericarditis Z86.79          Therapy:     FIM SCORES Initial Assessment Weekly Progress Assessment 7/17/2019   Eating Functional Level: 5 Feeding/Eating Assistance: 7 (Independent)  Comments: Seated in w/c in dining room pt able to open/manip containers I'ly and self feed w/o asst    Swallowing     Grooming 5 Grooming Assistance : 5 (Supervision)(S/U)  Comments: Per last assessment 7/16 pt performed washing face with set-up of wash cloth in hand    Bathing 3(supine/ HOB raised) Bathing Assistance, Upper: 5 (Stand-by assistance)(Set-up. Per last assessment 7/16)  Position Performed: Seated in chair; Other (comment)(TTB)  Adaptive Equipment: Long handled sponge; Tub bench  Bathing Assistance, Lower : 5 (Stand-by assistance)(Per last assessment 7/16)  Adaptive Equipment: Long handled sponge  Position Performed: Seated in chair  Adaptive Equipment: Tub bench  Comments: Pt performed shifting weight side:side with maintaining L LE in neutral position to wash buttocks region    Upper Body Dressing Functional Level: 5  Items Applied/Steps Completed: Pullover (4 steps)  Comments: seated e Dressing Assistance : 5 (Supervision)(Set-up, per last assessment 7/16)  Comments: Performed seated in w/c with set-up of shirt in hand to perform dressing    Lower Body Dressing Functional Level: 3  Items Applied/Steps Completed: Elastic waist pants (3 steps)  Comments: thread BLE and assit to pull up to thigh Dressing Assistance : 4 (Contact guard assistance)  Leg Crossed Method Used: No  Position Performed: Seated in chair;Standing  Adaptive Equipment Used: Grab bar;Long handled shoe horn;Reacher;Sock aid; Walker  Comments: Pt doffed/donned LB clothing on 7/16 with use of AE and CGA overall. Pt provided with wide bariatric sock aide today and donned (B) socks with SBA overall. Pt doffed (B) socks with use of reacher with MI. Toileting Functional Level: 1 Toileting Assistance (FIM Score): 5 (Supervision)(Close SBA, Simulated dry toileting on 3:1)  Cues: Verbal cues provided  Adaptive Equipment: Grab bars; [place]; Other (comment)(3:1)   Bladder  level of assist 3     Bladder  accident frequency score 3     Bowel  level of assist 3     Bowel  accident frequency score 3     Toilet Transfer [place] Toilet Transfer Score: 3(using FWW) 5 (stand-by assistance) Tub/Shower Transfer [place] Tub or Shower Type: Shower  Tub/Shower Transfer Score: 0 Shower  4 (Contact guard assistance)   Comprehension Primary Mode of Comprehension: Auditory  Score: 5 Auditory  7   Expression Primary Mode of Expression: Verbal  Score: 5 Verbal  7   Social Interaction Score: 5 6   Problem Solving Score: 5 7   Memory Score: 5 6     FIM SCORES Initial Assessment Weekly Progress Assessment 7/17/2019   Bed/Chair/Wheelchair Transfers Other: stand step with RW  Transfer Assistance : 3 (Moderate assistance )  Sit to Stand Assistance:  Moderate assistance  Car Transfers: Not tested  Car Type: N/A Other: Stand Step with RW  Transfer Assistance : 4 (Contact guard assistance)  Sit to Stand Assistance: Contact guard assistance   Bed Mobility Rolling Right 4 (Minimal assistance)   Rolling Left 4 (Minimal assistance)   Supine to Sit 3 (Moderate assistance)   Sit to Stand Moderate assistance   Sit to Supine 3 (Moderate assistance)    Rolling Right       Rolling Left       Supine to Sit   4 (Minimal assistance)   Sit to Stand   Contact guard assistance   Sit to Supine   4 (Minimal assistance)      Locomotion (W/C) Able to Propel (ft): 150 feet  Functional Level: 4  Curbs/Ramps Assist Required (FIM Score): 0 (Not tested)  Wheelchair Setup Assist Required : 3 (Moderate assistance)  Wheelchair Management: Manages left brake, Manages right brake Function 5  Setup Assistance         Locomotion (W/C distance) 150 Feet 150 Feet   Locomotion (Walk) 1 (Dependent/total assistance) 4 (Contact guard assistance)  Walker, rolling   Locomotion (Walk dist.) 0 Feet (ft) 2 Feet (ft)(with stand step transfers)   Steps/Stairs Steps/Stairs Ambulated (#): 0  Level of Assist : 0 (Not tested)  Rail Use: None Steps/Stairs Ambulated (#): 0  Level of Assist : 0 (Not tested)  Rail Use: None         Nursing:     Neuro:   AAA&O x 4           Respiratory:   [x] WNL   [] O2 LPM:   Other:  Peripheral Vascular:   [] TEDS present   [] Edema present ____ Grade   Cardiac:   [x] WNL   [] Other  Genitourinary:   [x] continent   [] incontinent   [] jasso  Abdominal _7/17/19_____ LBM  GI: _Reg cardiac______ Diet _thin___ Liquids _____ tube feeds  Musculoskeletal: ____ ROM Transfers _____ Assistive Device Used  min____ Level of Assistance  Skin Integumentary:   [x] Intact   [] Not Intact   __________Preventative Measures  Details______________________________________________________________  Pain: [x] Controlled   [] Not Controlled   Pain Meds:   [] Scheduled   [] PRN        Registered Dietitian / Nutrition:     Patient Vitals for the past 100 hrs:   % Diet Eaten   07/17/19 1324 75 %   07/17/19 0940 75 %   07/16/19 1738 75 %   07/16/19 1307 75 %   07/16/19 0944 75 %   07/14/19 1814 75 %   07/14/19 1315 95 %   07/14/19 0900 100 %     Pt reported appetite and meal intake good, improving. Tolerating diet. Consuming Ensure supplements; pt reported liking Vanilla flavor more now. Supplements:          [x] Yes: Ensure Enlive TID   [] No      Amount of supplement consumed:  100%      Intake/Output Summary (Last 24 hours) at 7/17/2019 1524  Last data filed at 7/17/2019 1324  Gross per 24 hour   Intake 1440 ml   Output    Net 1440 ml                                Last bowel movement: 7/14      Interdisciplinary Team Goals:     1. Discipline  Physical Therapy    Goal  Encourage pt to perform functional transfers with RW with supervision maintaining left LE NWB consistently without verbal reminders    Barrier  Impaired functional strength and balance, NWB left LE    Intervention  Education, Transfer training, Functional strength and balance training    Goal written by:   Sindy Mendoza PT, DPT     2. Discipline  Occupational Therapy    Goal  Pt will increase functional transfer independence <>TTB with supervision and maintaining NWB status w/ no more than 1x v/c for success.      Barrier  Decreased UB strength, decreased (I) in functional transfers, fatigue, NWB L LE, impulsiveness during sit<>stand     Intervention  Therex, education , ped-alert for auditory feedback, repetition training     Goal written by:  Miya Nino OTR/L     3. Discipline  Speech Therapy    Goal      Barrier      Intervention      Goal written by:       4. Discipline  Nursing    Goal  Pt. Pain level will be be less than < 3 during therapy sessions. Barrier  s/p Total Hip replacement. Intervention  Assess pain level  frequently, and  Premedicate pt. At least 30 min. Prior to therapy session. Encourage proper repositioning  Techniques and diversion activities. Goal written by:  Whit Mayes RN     5. Discipline  Clinical Psychology    Goal  Maintain mood stability and hopefulness with decreased worry and anxiety    Barrier  Situational stress and disposition uncertainty    Intervention  Patient/spouse education and support     Goal written by:  Demond Morales, PhD     6. Discipline  Nutrition / Dietetics    Goal  Po intake of meals will meet >75% of patient estimated nutritional needs within the next 7 days. Outcome:  [] Met/Ongoing    [x] Progressing towards goal    [] Not progressing towards goal    [] New/Initial goal     Barrier  decreased appetite, now improving    Intervention  continue po diet. Continue nutrition supplements; modify flavor preference    Goal written by:  Leslie Hunter RD       Disposition / Discharge Planning:      Follow-up services:  [x] Physical Therapy             [x] Occupational Therapy       [] Speech Therapy           [] Skilled Nursing      [] Medical Social Worker   [] Aide        [] Outpatient     [x] [facility] with 24 hour assistance   [] [address]   [] [facility]   DME recommendations:  18\" w/c with gel cushion and 32\" transfer board, 3:1 commode    Estimated discharge date:  7/23/19   Discharge Location:  [x] Home   [] [facility]   [] [facility]             [] Other:          Electronic Signatures: Signature Date Signed   Physical Therapist    Erum Faria PT, DPT 07/17/2019    Occupational Therapist    Adam Maxwell, OTR/L  7/17/2019   Speech Therapist         Recreational Therapist    Sandee West, CTRS 7/171/19   Nursing    Sury Long, SHARRON  7/17/19   Dietitian    Tyler Rebolledo, RD  7/17/19   Clinical Psychologist    Chandler Baeza, PhD 7/17/19    Physician    Lindy Olmos MD   7/17/2019        Wilian Sherwood, IAN  7/17/19         The above information has been reviewed with the patient in a language that they can understand. Opportunity for comments and questions has been provided and a signed attestation has been scanned into the \"media tab\" of the EMR.       Patient Signature: ______________________________________________________    Date Signed: __________________________________________________________

## 2019-07-17 NOTE — PROGRESS NOTES
[facility] PHYSICAL REHABILITATION  [address] [place] [address], [address]     INPATIENT REHABILITATION  DAILY PROGRESS NOTE     Date: 7/17/2019    Name: Lidya Cruz. Age / Sex: 80 y.o. / male   CSN: [id number] MRN: [id number]   Admit Date: 7/9/2019 Length of Stay: 8 days     Primary Rehab Diagnosis: Impaired Mobility and ADLs secondary to:  1. S/P Open reduction internal fixation of displaced subtrochanteric periprosthetic fracture around internal prosthetic left hip joint (6/25/2019 - Dr. Gildardo Arredondo)  2. History of displaced subtrochanteric periprosthetic fracture of left femur around internal prosthetic left hip joint; S/P Left total hip replacement (2/18/2019 - Dr. Gildardo Arredondo)      Subjective:     Patient seen and examined. Blood pressure controlled. Patient's Complaint:   Urinary problems (increased urinary frequency)  Difficulty sleeping    Pain Control: ongoing significant pain in which is stable and controlled by current meds      Objective:     Vital Signs:  Patient Vitals for the past 24 hrs:   BP Temp Pulse Resp SpO2   07/17/19 0800 135/65 98.5 °F (36.9 °C) 66 18 98 %   07/16/19 2155 128/70 97 °F (36.1 °C) 80 18 96 %   07/16/19 1600 120/67 97.4 °F (36.3 °C) 71 18 98 %        Physical Examination:  GENERAL SURVEY: Patient is awake, alert, oriented x 3, sitting comfortably on the chair, not in acute respiratory distress.   HEENT: pale palpebral conjunctivae, anicteric sclerae, no nasoaural discharge, moist oral mucosa  NECK: supple, no jugular venous distention, no palpable lymph nodes  CHEST/LUNGS: symmetrical chest expansion, good air entry, clear breath sounds  HEART: adynamic precordium, good S1 S2, no S3, regular rhythm, no murmurs  ABDOMEN: obese, bowel sounds appreciated, soft, non-tender  EXTREMITIES: pale nailbeds, no edema, full and equal pulses, no calf tenderness   NEUROLOGICAL EXAM: The patient is awake, alert and oriented x3, able to answer questions fairly appropriately, able to follow 1 and 2 step commands. Able to tell time from the wall clock. Cranial nerves II-XII are grossly intact. No gross sensory deficit. Motor strength is 4+/5 on BUE, 4 to 4+/5 on the RLE, 2-/5 on the LLE (except 3- on the left ankle).      Incision(s): healing well, clean, dry, and intact      Current Medications:  Current Facility-Administered Medications   Medication Dose Route Frequency    tamsulosin (FLOMAX) capsule 0.4 mg  0.4 mg Oral DAILY    diclofenac (FLECTOR) 1.3 % transdermal patch 1 Patch  1 Patch TransDERmal Q12H    cholecalciferol (VITAMIN D3) capsule 5,000 Units  5,000 Units Oral DAILY    calcium citrate tablet 200 mg [elemental]  200 mg Oral BID    warfarin - physician to dose   Other Q24H    ferrous sulfate tablet 325 mg  1 Tab Oral DAILY WITH BREAKFAST    ascorbic acid (vitamin C) (VITAMIN C) tablet 250 mg  250 mg Oral DAILY WITH BREAKFAST    methocarbamol (ROBAXIN) tablet 500 mg  500 mg Oral Q8H    acetaminophen (TYLENOL) tablet 650 mg  650 mg Oral Q4H PRN    bisacodyl (DULCOLAX) tablet 10 mg  10 mg Oral Q48H PRN    HYDROcodone-acetaminophen (NORCO) 5-325 mg per tablet 1 Tab  1 Tab Oral Q4H PRN    simvastatin (ZOCOR) tablet 10 mg  10 mg Oral QHS       Allergies:  No Known Allergies      Functional Progress:    PHYSICAL THERAPY    ON ADMISSION MOST RECENT   Wheelchair Mobility/Management  Able to Propel (ft): 150 feet  Functional Level: 4  Curbs/Ramps Assist Required (FIM Score): 0 (Not tested)  Wheelchair Setup Assist Required : 3 (Moderate assistance)  Wheelchair Management: Manages left brake, Manages right brake Wheelchair Mobility/Management  Able to Propel (ft): 250 feet  Functional Level: 5  Curbs/Ramps Assist Required (FIM Score): 5 (Supervision)  Wheelchair Setup Assist Required : 3 (Moderate assistance)  Wheelchair Management: Manages left brake, Manages right brake, Manages right footrest     Gait  Amount of Assistance: 1 (Dependent/total assistance)  Distance (ft): 0 Feet (ft)  Assistive Device: Walker, rolling Gait  Amount of Assistance: 4 (Contact guard assistance)  Distance (ft): 2 Feet (ft)(with stand step transfers)  Assistive Device: Walker, rolling     Balance-Sitting/Standing  Sitting - Static: Good (unsupported)  Sitting - Dynamic: Good (unsupported)  Standing - Static: Poor  Standing - Dynamic : Impaired Balance-Sitting/Standing  Sitting - Static: Good (unsupported)  Sitting - Dynamic: Good (unsupported)  Standing - Static: Poor(due to NWB left LE requiring B UE support on RW)  Standing - Dynamic : Impaired     Bed/Mat Mobility  Rolling Right : 4 (Minimal assistance)  Rolling Left : 4 (Minimal assistance)  Supine to Sit : 3 (Moderate assistance)  Sit to Supine : 3 (Moderate assistance) Bed/Mat Mobility  Rolling Right : 4 (Minimal assistance)  Rolling Left : 4 (Minimal assistance)  Supine to Sit : 4 (Minimal assistance)  Sit to Supine : 4 (Minimal assistance)     Transfers  Other: stand step with RW  Transfer Assistance : 3 (Moderate assistance )  Sit to Stand Assistance:  Moderate assistance  Car Transfers: Not tested  Car Type: N/A Transfers  Other: Stand Step with RW  Transfer Assistance : 4 (Contact guard assistance)  Sit to Stand Assistance: Contact guard assistance  Car Transfers: Not tested  Car Type: N/A     Steps or Stairs  Steps/Stairs Ambulated (#): 0  Level of Assist : 0 (Not tested)  Rail Use: None Steps or Stairs  Steps/Stairs Ambulated (#): 0  Level of Assist : 0 (Not tested)  Rail Use: None         Lab/Data Review:  Recent Results (from the past 24 hour(s))   PROTHROMBIN TIME + INR    Collection Time: 07/17/19  6:20 AM   Result Value Ref Range    Prothrombin time 28.6 (H) 11.5 - 15.2 sec    INR 2.7 (H) 0.8 - 1.2         Estimated Glomerular Filtration Rate:  On admission, estimated GFR based on a Creatinine of 0.89 mg/dl:              Using CKD-EPI = 78.5 mL/min/1.73m2              Using MDRD = 86.6 mL/min/1.73m2      Assessment:     Primary Rehabilitation Diagnosis  1. Impaired Mobility and ADLs  2. S/P Open reduction internal fixation of displaced subtrochanteric periprosthetic fracture around internal prosthetic left hip joint (6/25/2019 - Dr. Cristal Shelby)  3. History of displaced subtrochanteric periprosthetic fracture of left femur around internal prosthetic left hip joint; S/P Left total hip replacement (2/18/2019 - Dr. 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    Mild aortic stenosis I35.0    Acute blood loss as cause of postoperative anemia D62    Acute pulmonary embolism  I26.99    Anticoagulated on warfarin Z79.01    Primary osteoarthritis involving multiple joints M15.0    Gastroesophageal reflux disease K21.9    Dyslipidemia E78.5    Diverticulosis K57.90    History of deep vein thrombosis of lower extremity Z86.718    Sick sinus syndrome  I49.5    Benign prostatic hyperplasia with lower urinary tract symptoms N40.1    Tremor R25.1    Obesity, Class I, BMI 30-34.9 E66.9    Vitamin D deficiency E55.9    Essential tremor G25.0    History of pericarditis Z86.79        Plan:     1. Justification for continued stay: Good progression towards established rehabilitation goals. 2. Medical Issues being followed closely:    [x]  Fall and safety precautions     [x]  Wound Care     [x]  Bowel and Bladder Function     [x]  Fluid Electrolyte and Nutrition Balance     [x]  Pain Control      3.  Issues that 24 hour rehabilitation nursing is following:    [x]  Fall and safety precautions     [x]  Wound Care     [x]  Bowel and Bladder Function     [x]  Fluid Electrolyte and Nutrition Balance     [x]  Pain Control      [x]  Assistance with and education on in-room safety with transfers to and from the bed, wheelchair, toilet and shower. 4. Acute rehabilitation plan of care:    [x]  Continue current care and rehab. [x]  Physical Therapy           [x]  Occupational Therapy           []  Speech Therapy     []  Hold Rehab until further notice     5. Medications:    [x]  MAR Reviewed     [x]  Continue Present Medications     6. DVT Prophylaxis:      []  Lovenox     []  Unfractionated Heparin     [x]  Coumadin     []  NOAC     []  WINDY Stockings     []  Sequential Compression Device     []  None     7. Orders:   > S/P Open reduction internal fixation of displaced subtrochanteric periprosthetic fracture around internal prosthetic left hip joint (6/25/2019 - Dr. Romulo Dutta);  History of displaced subtrochanteric periprosthetic fracture of left femur around internal prosthetic left hip joint; S/P Left total hip replacement (2/18/2019 - Dr. Romulo Dutta)              > Nonweightbearing on the left lower extremity              > Total hip precautions on left hip              > Staples were removed on 7/9/2019               > On 7/10/2019, started Calcium citrate 1 tab PO BID   > Continue Calcium citrate 1 tab PO BID     > Acute Postoperative Blood Loss Anemia              > Hgb/Hct (7/10/2019, on admission) = 8.8/28.0              > Anemia work-up showed serum iron 40, TIBC 237, iron % saturation 17, ferritin 568, reticulocyte count 5.0   > On 7/10/2019, started FeSO4 and Ascorbic acid   > Hgb/Hct (7/15/2019) = 9.3/29.5    > Continue:               > FeSO4 325 mg PO once daily with breakfast                > Ascorbic Acid 250 mg PO once daily with breakfast (to enhance the absorption of the FeSO4)      > Acute pulmonary embolism, anticoagulated on Warfarin              > Target INR = 2 to 3              > INR (7/17/2019) = 2.7              > Patient received Warfarin 4 mg PO last night               > Warfarin 2.5 mg PO tonight                > Sepsis due to Pseudomonas aeruginosa > Blood culture (7/5/2019; 1 out of 2 sets) yielded growth of PSeudomonas aeruginosa (PANSENSITIVE)              > On 7/5/2019, patient was empirically started on Piperacillin-Tazobactam 4.5 grams IV q 6 hr and a single dose of Vancomycin IV was given              > On 7/9/2019, Piperacillin-Tazobactam IV was discontinued and patient was discharged from [facility] on oral Ciprofloxacin 500 mg PO q 12 hr x 4 days              > On 7/13/2019, patient had completed the recommended 4-day treatment course of Ciprofloxacin 500 mg PO q 12 hr      > Vitamin D Deficiency              > Vitamin D 25-Hydroxy (7/10/2019) = 18.6              > On 7/10/2019, patient was given Cholecalciferol 50,000 units PO x 1 dose               > On 7/11/2019, started Cholecalciferol 5,000 units PO once daily   > Continue Cholecalciferol 5,000 units PO once daily     > Increased urinary frequency   > Patient stated he has had increased urinary frequency even prior to admission to the acute care hospital; patient inquired of options of treatment for increased frequency / possible prostate issues; patient stated he tried Tamsulosin in the past with no reported adverse reactions   > Will start a trial of Tamsulosin 0.4 mg PO once daily    > Difficulty sleeping   > Diphenhydramine 25 mg PO q HS PRN for sleep    > Analgesia   > On 7/15/2019, added Diclofenac 1.3% transdermal patch, 1 patch on skin of left thigh q 12 hr   > Continue:               > Acetaminophen 650 mg PO q 4 hr PRN for pain level less than 5/10    > Diclofenac 1.3% transdermal patch, 1 patch on skin of left thigh q 12 hr               > Methocarbamol 500 mg PO q 8 hr                > Norco 5/325 1 tab PO q 4 hr PRN for pain level greater than 4/10      > Diet:              > Specifications: Low saturated fat              > Solids (consistency): Regular               > Liquids (consistency): Thin       8.  Patient's progress in rehabilitation and medical issues discussed with the patient. All questions answered to the best of my ability. Care plan discussed with patient and nurse.       Signed:    Ayanna Gould MD    July 17, 2019

## 2019-07-17 NOTE — ROUTINE PROCESS
0800 Pt. Awake sitting up in bed no change in assessment no signs of distress pt. Stated to be feeling fine. 0930 Pt. Siting up in chair eating breakfast.  1200 pt. With therapy. 1330 able to transfer by wheelchair. 1440  no change in assessment. 1800 Pt. Sitting up in chair eating dinner. Family in room.

## 2019-07-17 NOTE — PROGRESS NOTES
Problem: Mobility Impaired (Adult and Pediatric)  Goal: *Acute Goals and Plan of Care (Insert Text)  Description  Physical Therapy Goals  Short Term Goals  Initiated 7/10/2019 and to be accomplished within 7 day(s) (7/17/2019)  1. Patient will move from supine to sit and sit to supine  in bed with minimal assistance/contact guard assist.     2.  Patient will transfer from bed to chair and chair to bed with minimal assistance/contact guard assist using the least restrictive device. 3.  Patient will perform sit to stand with minimal assistance/contact guard assist.  4.  Patient will ambulate with moderate assistance  for 10 feet with the least restrictive device. Long Term Goals  Initiated 7/10/2019 and to be accomplished within 2-3 weeks (7/24/2019-7/31/2019)  1. Patient will move from supine to sit and sit to supine , scoot up and down and roll side to side in bed with modified independence. 2.  Patient will transfer from bed to chair and chair to bed with supervision/set-up using the least restrictive device. 3.  Patient will perform sit to stand with supervision/set-up. 4.  Patient will ambulate with supervision/set-up for 50 feet with RW maintaining NWB left LE. 5.  Patient will propel w/c over level surfaces greater than 150 feet and over entryways, thresholds, and ramps with modified independence. Outcome: Progressing Towards Goal     PHYSICAL THERAPY WEEKLY PROGRESS NOTE    Patient: Mercedes German (80 y.o. male)  Date: 7/17/2019  Diagnosis: Status post open reduction with internal fixation of fracture [Z96.7, Z87.81] Status post open reduction with internal fixation of fracture  Precautions: Fall Risk Precautions, NWB Left LE, THP Left LE   Chart, physical therapy assessment, plan of care and goals were reviewed. Time In: 0930  Time Out: 1030  Patient Seen For: Balance activities; Patient education;Transfer training;Family training  Time In: 1400  Time Out: 1440  Patient Seen For: Balance activities; Patient education;Transfer training;Gait training    Pain:  Pt did not c/o pain during first treatment session noting that he received a \"patch from the doctor,\" that was helping to alleviate his pain. Pt did not rate pain at start of second treatment session but had indicated pain report to OT who was speaking with pt's nurse re: c/o pain. Patient identified with name and : yes    SUBJECTIVE:     Pt appears receptive to education provided during family training. However, pt's wife notes apprehension with participation in transfer board training and declined hands on training with use of transfer board. With encouragement, pt's wife is agreeable to attempt prior to pt's scheduled discharge date after more opportunity to observe treatment. During PM session, pt reports feeling very fatigued. Pt had recently finished treatment with OT and she was alerting pt's nurse as PT initiated treatment session as well as indicating pt's pain to his nursing staff. Pt reports concern re: ability to consistently maintain left LE NWB through transfers and was receptive to re-education re: purpose of use of transfer board for safety as needed. However, pt reports concerns re: wife's apparent anxiety with use of transfer board as well as his own concerns that if he were to have a fall at home he would hurt his wife. Educated pt re: PT recommendations for safest options with mobility and attempted to ease pt's concerns. OBJECTIVE DATA SUMMARY:   AROM: Limited Left LE due to functional strength as well as precautions.     FIM SCORES Initial Assessment Weekly Progress Assessment 2019   Bed/Chair/Wheelchair Transfers 3 4   Wheelchair Mobility 4 5   Walking [place] 0(Unable to safely maintain NWB with stand step transfers) 1   Steps/Stairs 0(Unable to safely maintain NWB with stand step transfers) 1 (Not safe to assess)   Please see [facility] Interdisciplinary Eval: Coordination/Balance Section for details regarding FIM score description. BED/CHAIR/WHEELCHAIR TRANSFERS Initial Assessment Weekly Progress Assessment 7/17/2019   Rolling Right 4 (Minimal assistance) 4 (Minimal assistance) for left LE management   Rolling Left 4 (Minimal assistance) 4 (Minimal assistance) due to decreased tolerance   Supine to Sit 3 (Moderate assistance) 5 (Supervision) for left LE management from bed to dependent position with use of leg  with verbal reminders for maintaining THP. Sit to Stand Moderate assistance Contact guard assistance for safety/balance with min verbal cuing for safe hand placement and maintaining left LE NWB with sit to stand and stand to sit. Sit to Supine 3 (Moderate assistance) 4 (Minimal assistance) for left LE management from dependent position onto bed. Transfer Type    Stand Step with RW (1 trial with PT with pt's wife observing and one trial with pt's wife providing assistance)  Lateral transfer board transfer (with PT assisting and pt's wife observing). Comments   Pt requires CGA for sit <> stand as described above and CGA for safety with min verbal cuing for RW management and left LE positioning to maintain left LE NW with advancement of right LE/stepping. Pt also requires mod to max verbal cues for scap add and depression with B UE weight bearing. Pt's wife observed PT assisting pt for first w/c <> mat table transfer and then assisted pt with second w/c <> mat table transfer under direct supervision of PT with mod to max verbal cues to maintain proper body mechanics with assisting pt utilizing gait belt to maintain both pt and family member safety. PT assisted pt with lateral transfer with transfer board w/c <> mat table with PT demonstrating to pt's wife proper w/c positioning and safe parts management with PT assisting pt for set up/positioning of transfer board and providing intermittent min assist for left LE to maintain left LE NWB throughout transfer.   Pt declines to be an active participant in transfer training utilizing transfer board. Car Transfer Not tested Not safe to assess with car transfer  due to pt's precuations   Car Type N/A NT     GROSS ASSESSMENT Weekly Progress Assessment 7/17/2019   AROM Grossly decreased, non-functional(left LE within precautions)   Strength Grossly decreased, non-functional(left LE within precautions)   Coordination Within functional limits   Tone Normal   Sensation Intact   PROM Generally decreased, functional(left LE within precautions)     POSTURE Weekly Progress Assessment 7/17/2019   Posture (WDL) Exceptions to WDL   Posture Assessment Forward head;Kyphosis;Rounded shoulders;Trunk flexion     WHEELCHAIR MOBILITY/MANAGEMENT Initial Assessment Weekly Progress Assessment 7/17/2019   Able to Propel 150 feet 150 Feet with distant supervision for negotiating narrow spaces and obstacles. Curbs/ramps assistance required 0 (Not tested) NT   Wheelchair set up assistance required 3 (Moderate assistance) Supervision   Wheelchair management Manages left brake, Manages right brake Manages B brakes     WALKING INDEPENDENCE Initial Assessment Weekly Progress Assessment 7/17/2019   Assistive device Walker, rolling Walker, rolling   Ambulation assistance - level surface Not safe to assess CGA   Distance 0 Feet (ft) 2 Feet (ft)(with stand step transfers), 3 Feet (ft) from bed to recliner chair during PM session. Comments Not safe to assess Pt requires CGA for safety and balance with forward or backward stepping with RW in process of performing stand step transfers. Pt also requires max verbal cues for scap add and depression with B UE weight bearing on RW to decrease rounded shoulder posture. During PM session, pt ambulated 3 feet with RW from EOB to recliner chair in room with CGA as described above and intermittent verbal cues to maintain left LE NWB and for decreased fwd flexed posture.   Pt also requires verbal cuing for B UE weight bearing on RW for increased right foot clearance. GAIT Weekly Progress Assessment 7/17/2019   Gait Description (WDL) Exceptions to WDL   Gait Abnormalities Pt demonstrates decreased right foot clearance with two or three forward steps and two or three backward steps during stand step transfers with RW requiring CGA for balance and safety. However, pt maintains left LE NWB throughout first treatment session. STEPS/STAIRS Initial Assessment Weekly Progress Assessment 7/17/2019   Steps/Stairs ambulated 0 0   Rail Use None None   Comments Not safe to assess Not safe to assess   Curbs/Ramps  Not safe to assess Not safe to assess       ASSESSMENT:  Pt has progressed to achieve 3/4 short term goals with the exception of ambulating 10 feet due to ongoing functional weakness and is progressing toward achieving established long term goals. Progression toward goals:  x      Improving appropriately and progressing toward goals  ? Improving slowly and progressing toward goals  ? Not making progress toward goals and plan of care will be adjusted     PLAN:  Patient continues to benefit from skilled intervention to address the above impairments. Continue treatment per established plan of care. Emphasize continued functional strength training and family teaching/education to promote safe d/c from ARU and increased independence with functional mobility. Discharge Recommendations:  Home Health Physical Therapy with 24 hour assistance  Further Equipment Recommendations for Discharge:  wheelchair 18 inch and 32\" transfer board     Estimated LOS: 7/23/2019    Activity Tolerance:   Fair - pt requires rest breaks throughout family training session and reports fatigue during afternoon session but is agreeable to participate to the best of his ability. Please refer to the flowsheet for vital signs taken during this treatment. After treatment:   ? Patient left in no apparent distress in bed  ?  Patient left in no apparent distress sitting up in recliner chair with needs in reach following second treatment session. ? Patient left in no apparent distress in w/c mobilizing under own power with wife providing supervision after fist session. ? Patient left in no apparent distress dining area  ? Patient left in no apparent distress mobilizing under own power  ? Call bell left within reach  ? Nursing notified  ? Wife present  ?  Bed alarm activated       Rodriguez Muse PT, DPT  7/17/2019

## 2019-07-17 NOTE — PROGRESS NOTES
SHIFT CHANGE NOTE FOR [facility] [place]    Bedside and Verbal shift change report given to [address]on (oncoming nurse) by Jackie Ricci LPN (offgoing nurse). Report included the following information SBAR, Kardex, MAR and Recent Results. Situation:   Code Status: Full Code   Reason for Admission: S/P ORIF  Hospital Day: 7   Problem List:   Hospital Problems  Date Reviewed: 7/16/2019          Codes Class Noted POA    Increased urinary frequency (Chronic) ICD-10-CM: R35.0  ICD-9-CM: 788.41  7/16/2019 Yes        Vitamin D deficiency (Chronic) ICD-10-CM: E55.9  ICD-9-CM: 268.9  7/10/2019 Yes    Overview Signed 7/10/2019 11:23 AM by Sylvia Fisher MD     Vitamin D 25-Hydroxy (7/10/2019) = 18.6              Sepsis due to Pseudomonas species [facility]) ICD-10-CM: A41.52  ICD-9-CM: 038.43, 995.91  7/5/2019 Yes    Overview Addendum 7/6/2019  8:16 PM by Tracie Kc MD     7/5/19      80 y.o. male recently admitted for femur fracturesent from skilled nursing facility for fever to 102.9. Patient was recently seen at [facility] for hip replacement status post fall. Patient endorses retrosternal chest pain that started upon waking this morning has been persistent since without propagating or palliating factors. Patient also endorses increased bilateral lower extremity swelling and difficulty breathing. WBC 29.2  P-87%,  U/A neg  CT  Heterogeneous collection posterior to the greater trochanter, most consistent with a postoperative hematoma. No rim enhancement to suggest abscess  CXR Small bilateral pleural effusions, left greater than right. Mild pulmonary edema.  No consolidation  7/6/19 ORTHO (Duane Nakai) Benign appearing left hip and femur now 11 days s/p ORIF periprosthetic hip fracture                         Impaired mobility and ADLs ICD-10-CM: Z74.09  ICD-9-CM: 799.89  7/5/2019 Yes        Acute pulmonary embolism ([address] ICD-10-CM: I26.99  ICD-9-CM: 415.19  7/1/2019 Yes    Overview Signed 7/9/2019  5:07 PM by Sylvia Fisher MD Pulmonary emboli at the periphery of the right main pulmonary artery extending into the middle and lower lobe branches             Anticoagulated on warfarin (Chronic) ICD-10-CM: Z79.01  ICD-9-CM: V58.61  7/1/2019 Yes        * (Principal) Status post open reduction with internal fixation of fracture ICD-10-CM: Z96.7, Z87.81  ICD-9-CM: V45.89, V15.51  6/25/2019 Yes    Overview Signed 7/9/2019  4:52 PM by Jesse Guardado MD     S/P Open reduction internal fixation of displaced subtrochanteric periprosthetic fracture around internal prosthetic left hip joint (6/25/2019 - Dr. Senthil Carrillo)             Acute blood loss as cause of postoperative anemia ICD-10-CM: D62  ICD-9-CM: 285.1  6/25/2019 Yes        Periprosthetic fracture around internal prosthetic left hip joint ([facility] ICD-10-CM: M97. 02XA  ICD-9-CM: 996.44, V43.64  6/24/2019 Yes    Overview Signed 7/9/2019  5:04 PM by Jesse Guardado MD     S/P Open reduction internal fixation of displaced subtrochanteric periprosthetic fracture around internal prosthetic left hip joint (6/25/2019 - Dr. Senthil Carrillo)             Displaced subtrochanteric fracture of left femur [facility]) ICD-10-CM: R88.07EW  ICD-9-CM: 820.22  6/24/2019 Yes    Overview Signed 7/9/2019  5:02 PM by Jesse Guardado MD     S/P Open reduction internal fixation of displaced subtrochanteric periprosthetic fracture around internal prosthetic left hip joint (6/25/2019 - Dr. Senthil Carrillo)             History of total left hip arthroplasty ICD-10-CM: J40.235  ICD-9-CM: V43.64  2/18/2019 Yes    Overview Addendum 7/9/2019  5:01 PM by Jesse Guardado MD     S/P Left total hip replacement (2/18/2019 - Dr. Senthil Carrillo)                   Background:   Past Medical History:   Past Medical History:   Diagnosis Date    Acute blood loss as cause of postoperative anemia 6/25/2019    Acute pulmonary embolism ([address] 7/1/2019    Pulmonary emboli at the periphery of the right main pulmonary artery extending into the middle and lower lobe branches    Anticoagulated on warfarin 2019    Benign prostatic hyperplasia with lower urinary tract symptoms     Demand ischemia of myocardium ([facility] 2019 NST EF 59% , small, reversible apical defect 19 ECHO EF 68%, LVH , LAE, RVE, ANDREY 1.4cm2 with 12mm mean gradient    Diverticulosis     Dyslipidemia     Essential tremor     Gastroesophageal reflux disease     History of pericarditis     Obesity, Class I, BMI 30-34.9     Paroxysmal atrial fibrillation ([address]     CVAL Maddy Saleh FNP   AF ablation 13 EPS with isolation of RSPV and LIPV 18 TEDDY  EF 55%, 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 male recently admitted for femur fracturesent from skilled nursing facility for fever to 102.9. Patient was recently seen at [facility] for hip replacement status post fall. Patient endorses retrosternal chest pain that started upon waking this morning has been persistent since without propagating or palliating factors.   Patient also endorses increased bilateral lower extremity s    Sick sinus syndrome ([facility]     Vitamin D deficiency 7/10/2019    Vitamin D 25-Hydroxy (7/10/2019) = 18.6       Patient taking anticoagulants yes Coumadin   Patient has a defibrillator: no     Assessment:   Changes in Assessment throughout shift: no     Patient has central line: no Reasons if yes: na  Insertion date:na Last dressing date:na   Patient has Truong Cath: no Reasons if yes: n/a   Insertion date:n/a     Last Vitals:     Vitals:    07/15/19 1556 07/15/19 2121 19 0800 19 1600   BP: 119/71 124/69 131/73 120/67   Pulse: 77 70 66 71   Resp: 18  18   Temp: 98.1 °F (36.7 °C) 97.7 °F (36.5 °C) 98.1 °F (36.7 °C) 97.4 °F (36.3 °C)   SpO2: 96% 97% 97% 98%   Weight:       Height:  PAIN    Pain Assessment    Pain Intensity 1: 6 (07/16/19 1953) Pain Intensity 1: 2 (12/29/14 1105)    Pain Location 1: Hip Pain Location 1: Abdomen    Pain Intervention(s) 1: Medication (see MAR) Pain Intervention(s) 1: Medication (see MAR)  Patient Stated Pain Goal: 0 Patient Stated Pain Goal: 0  o Intervention effective: yes    o Other actions taken for pain: no     Skin Assessment  Skin color Skin Color: Appropriate for ethnicity  Condition/Temperature Skin Condition/Temp: Dry, Warm  Integrity Skin Integrity: Incision (comment)  Turgor Turgor: Non-tenting  Weekly Pressure Ulcer Documentation  Pressure  Injury Documentation: No Pressure Injury Noted-Pressure Ulcer Prevention Initiated  Wound Prevention & Protection Methods  Orientation of wound Orientation of Wound Prevention: Posterior  Location of Prevention Location of Wound Prevention: Sacrum/Coccyx  Dressing Present Dressing Present : No  Dressing Status    Wound Offloading Wound Offloading (Prevention Methods): Bed, pressure redistribution/air, Repositioning     INTAKE/OUPUT  Date 07/15/19 1900 - 07/16/19 0659 07/16/19 0700 - 07/17/19 0659   Shift 7078-2210 24 Hour Total 9336-0076 9568-9559 24 Hour Total   INTAKE   P.O.   1800  1800     P. O.   1800  1800   Shift Total(mL/kg)   1800(17.4)  1800(17.4)   OUTPUT   Urine(mL/kg/hr) 2100 2100        Urine Voided 2100 2100        Urine Occurrence(s) 7 x 10 x 3 x  3 x   Emesis/NG output          Emesis Occurrence(s)   0 x  0 x   Stool          Stool Occurrence(s) 0 x 0 x 0 x  0 x   Shift Total(mL/kg) 2100(20.3) 2100(20.3)      NET -2100 -2100 1800  1800   Weight (kg) 103.4 103.4 103.4 103.4 103.4       Recommendations:  1. Patient needs and requests: addressed    2. Diet: cardiac    3. Pending tests/procedures: none     4. Functional Level/Equipment: whellchair    5.  Estimated Discharge Date: TBD Posted on Whiteboard in Patients Room: no     HEALS Safety Check    A safety check occurred in the patient's room between off going nurse and oncoming nurse listed above. The safety check included the below items  Area Items   H  High Alert Medications - Verify all high alert medication drips (heparin, PCA, etc.)   E  Equipment - Suction is set up for ALL patients (with presley)  - Red plugs utilized for all equipment (IV pumps, etc.)  - WOWs wiped down at end of shift.  - Room stocked with oxygen, suction, and other unit-specific supplies   A  Alarms - Bed alarm is set for fall risk patients  - Ensure chair alarm is in place and activated if patient is up in a chair   L  Lines - Check IV for any infiltration  - Truong bag is empty if patient has a Truong   - Tubing and IV bags are labeled   S  Safety   - Room is clean, patient is clean, and equipment is clean. - Hallways are clear from equipment besides carts. - Fall bracelet on for fall risk patients  - Ensure room is clear and free of clutter  - Suction is set up for ALL patients (with presley)  - Hallways are clear from equipment besides carts.    - Isolation precautions followed, supplies available outside room, sign posted

## 2019-07-17 NOTE — PROGRESS NOTES
SHIFT CHANGE NOTE FOR [facility]    Bedside and Verbal shift change report given to Agnieszka Dey RN (oncoming nurse) by Kim Blank RN (offgoing nurse). Report included the following information SBAR, Kardex, MAR and Recent Results. Situation:   Code Status: Full Code   Reason for Admission: S/P ORIF  Hospital Day: 8   Problem List:   Hospital Problems  Date Reviewed: 7/17/2019          Codes Class Noted POA    Increased urinary frequency (Chronic) ICD-10-CM: R35.0  ICD-9-CM: 788.41  7/16/2019 Yes        Vitamin D deficiency (Chronic) ICD-10-CM: E55.9  ICD-9-CM: 268.9  7/10/2019 Yes    Overview Signed 7/10/2019 11:23 AM by Gabriel Wong MD     Vitamin D 25-Hydroxy (7/10/2019) = 18.6              Sepsis due to Pseudomonas species [facility]) ICD-10-CM: A41.52  ICD-9-CM: 038.43, 995.91  7/5/2019 Yes    Overview Addendum 7/6/2019  8:16 PM by Jojo Chilel MD     7/5/19      80 y.o. male recently admitted for femur fracturesent from skilled nursing facility for fever to 102.9. Patient was recently seen at [facility] for hip replacement status post fall. Patient endorses retrosternal chest pain that started upon waking this morning has been persistent since without propagating or palliating factors. Patient also endorses increased bilateral lower extremity swelling and difficulty breathing. WBC 29.2  P-87%,  U/A neg  CT  Heterogeneous collection posterior to the greater trochanter, most consistent with a postoperative hematoma. No rim enhancement to suggest abscess  CXR Small bilateral pleural effusions, left greater than right. Mild pulmonary edema.  No consolidation  7/6/19 ORTHO [facility]) Benign appearing left hip and femur now 11 days s/p ORIF periprosthetic hip fracture                         Impaired mobility and ADLs ICD-10-CM: Z74.09  ICD-9-CM: 799.89  7/5/2019 Yes        Acute pulmonary embolism ([facility] ICD-10-CM: I26.99  ICD-9-CM: 415.19  7/1/2019 Yes    Overview Signed 7/9/2019  5:07 PM by Cindi Aaron MD KRIS     Pulmonary emboli at the periphery of the right main pulmonary artery extending into the middle and lower lobe branches             Anticoagulated on warfarin (Chronic) ICD-10-CM: Z79.01  ICD-9-CM: V58.61  7/1/2019 Yes        * (Principal) Status post open reduction with internal fixation of fracture ICD-10-CM: Z96.7, Z87.81  ICD-9-CM: V45.89, V15.51  6/25/2019 Yes    Overview Signed 7/9/2019  4:52 PM by Juanita Motley MD     S/P Open reduction internal fixation of displaced subtrochanteric periprosthetic fracture around internal prosthetic left hip joint (6/25/2019 - Dr. Mike Diallo)             Acute blood loss as cause of postoperative anemia ICD-10-CM: D62  ICD-9-CM: 285.1  6/25/2019 Yes        Periprosthetic fracture around internal prosthetic left hip joint ([facility] ICD-10-CM: M97. 02XA  ICD-9-CM: 996.44, V43.64  6/24/2019 Yes    Overview Signed 7/9/2019  5:04 PM by Juanita Motley MD     S/P Open reduction internal fixation of displaced subtrochanteric periprosthetic fracture around internal prosthetic left hip joint (6/25/2019 - Dr. Mike Diallo)             Displaced subtrochanteric fracture of left femur [facility]) ICD-10-CM: U62.04GA  ICD-9-CM: 820.22  6/24/2019 Yes    Overview Signed 7/9/2019  5:02 PM by Juanita Motley MD     S/P Open reduction internal fixation of displaced subtrochanteric periprosthetic fracture around internal prosthetic left hip joint (6/25/2019 - Dr. Mike Diallo)             History of total left hip arthroplasty ICD-10-CM: W33.012  ICD-9-CM: V43.64  2/18/2019 Yes    Overview Addendum 7/9/2019  5:01 PM by Juanita Motley MD     S/P Left total hip replacement (2/18/2019 - Dr. Mike Diallo)                   Background:   Past Medical History:   Past Medical History:   Diagnosis Date    Acute blood loss as cause of postoperative anemia 6/25/2019    Acute pulmonary embolism ([address] 7/1/2019    Pulmonary emboli at the periphery of the right main pulmonary artery extending into the middle and lower lobe branches    Anticoagulated on warfarin 7/1/2019    Benign prostatic hyperplasia with lower urinary tract symptoms     Demand ischemia of myocardium ([facility] [address] 7/6/2019 4/16/18 NST EF 59% , small, reversible apical defect 7/2/19 ECHO EF 68%, LVH 14/16, LAE, RVE, ANDREY 1.4cm2 with 12mm mean gradient    Diverticulosis     Dyslipidemia     Essential tremor     Gastroesophageal reflux disease     History of pericarditis     Obesity, Class I, BMI 30-34.9     Paroxysmal atrial fibrillation ([address]     CVAL Maddy Saleh FNP 2008  AF ablation 9/11/13 EPS with isolation of RSPV and LIPV 6/4/18 TEDDY  EF 55%, 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 male recently admitted for femur fracturesent from skilled nursing facility for fever to 102.9. Patient was recently seen at [facility] for hip replacement status post fall. Patient endorses retrosternal chest pain that started upon waking this morning has been persistent since without propagating or palliating factors.   Patient also endorses increased bilateral lower extremity s    Sick sinus syndrome ([address]     Vitamin D deficiency 7/10/2019    Vitamin D 25-Hydroxy (7/10/2019) = 18.6       Patient taking anticoagulants yes Coumadin   Patient has a defibrillator: no     Assessment:   Changes in Assessment throughout shift: no     Patient has central line: no Reasons if yes: na  Insertion date:na Last dressing date:na   Patient has Truong Cath: no Reasons if yes: n/a   Insertion date:n/a     Last Vitals:     Vitals:    07/16/19 2155 07/17/19 0800 07/17/19 1351 07/17/19 1600   BP: 128/70 135/65 130/74 124/66   Pulse: 80 66 79 73   Resp: 18 18  18   Temp: 97 °F (36.1 °C) 98.5 °F (36.9 °C)  98.3 °F (36.8 °C)   SpO2: 96% 98% 98% 96%   Weight:       Height:  PAIN    Pain Assessment    Pain Intensity 1: 8 (07/17/19 1814) Pain Intensity 1: 2 (12/29/14 1105)    Pain Location 1: Hip Pain Location 1: Abdomen    Pain Intervention(s) 1: Medication (see MAR) Pain Intervention(s) 1: Medication (see MAR)  Patient Stated Pain Goal: 0 Patient Stated Pain Goal: 0  o Intervention effective: yes    o Other actions taken for pain: no     Skin Assessment  Skin color Skin Color: Appropriate for ethnicity  Condition/Temperature Skin Condition/Temp: Dry, Warm  Integrity Skin Integrity: Incision (comment)  Turgor Turgor: Non-tenting  Weekly Pressure Ulcer Documentation  Pressure  Injury Documentation: No Pressure Injury Noted-Pressure Ulcer Prevention Initiated  Wound Prevention & Protection Methods  Orientation of wound Orientation of Wound Prevention: Posterior  Location of Prevention Location of Wound Prevention: Sacrum/Coccyx  Dressing Present Dressing Present : No  Dressing Status    Wound Offloading Wound Offloading (Prevention Methods): Bed, pressure redistribution/air, Repositioning     INTAKE/OUPUT  Date 07/16/19 0700 - 07/17/19 0659 07/17/19 0700 - 07/18/19 0659   Shift 8216-9484 9670-0092 24 Hour Total 7470-0585 3060-8378 24 Hour Total   INTAKE   P.O. 1800  1800 1440  1440     P. O. 1800  1800 1440  1440   Shift Total(mL/kg) 1800(17.4)  1800(17.4) 1440(13.9)  1440(13.9)   OUTPUT   Urine(mL/kg/hr)           Urine Occurrence(s) 3 x 3 x 6 x 5 x  5 x   Emesis/NG output           Emesis Occurrence(s) 0 x  0 x 0 x  0 x   Stool           Stool Occurrence(s) 0 x 0 x 0 x 1 x  1 x   Shift Total(mL/kg)         NET 1800  1800 1440  1440   Weight (kg) 103.4 103.4 103.4 103.4 103.4 103.4       Recommendations:  1. Patient needs and requests: addressed    2. Diet: cardiac    3. Pending tests/procedures: none     4. Functional Level/Equipment: whellchair    5.  Estimated Discharge Date: TBD Posted on Whiteboard in Patients Room: no     HEALS Safety Check    A safety check occurred in the patient's room between off going nurse and oncoming nurse listed above. The safety check included the below items  Area Items   H  High Alert Medications - Verify all high alert medication drips (heparin, PCA, etc.)   E  Equipment - Suction is set up for ALL patients (with presley)  - Red plugs utilized for all equipment (IV pumps, etc.)  - WOWs wiped down at end of shift.  - Room stocked with oxygen, suction, and other unit-specific supplies   A  Alarms - Bed alarm is set for fall risk patients  - Ensure chair alarm is in place and activated if patient is up in a chair   L  Lines - Check IV for any infiltration  - Truong bag is empty if patient has a Truong   - Tubing and IV bags are labeled   S  Safety   - Room is clean, patient is clean, and equipment is clean. - Hallways are clear from equipment besides carts. - Fall bracelet on for fall risk patients  - Ensure room is clear and free of clutter  - Suction is set up for ALL patients (with presley)  - Hallways are clear from equipment besides carts.    - Isolation precautions followed, supplies available outside room, sign posted

## 2019-07-18 LAB
HCT VFR BLD AUTO: 29.6 % (ref 36–48)
HGB BLD-MCNC: 9.3 G/DL (ref 13–16)
INR PPP: 2.6 (ref 0.8–1.2)
PROTHROMBIN TIME: 28.2 SEC (ref 11.5–15.2)

## 2019-07-18 PROCEDURE — 85610 PROTHROMBIN TIME: CPT

## 2019-07-18 PROCEDURE — 97110 THERAPEUTIC EXERCISES: CPT

## 2019-07-18 PROCEDURE — 85018 HEMOGLOBIN: CPT

## 2019-07-18 PROCEDURE — 74011250637 HC RX REV CODE- 250/637: Performed by: INTERNAL MEDICINE

## 2019-07-18 PROCEDURE — 97530 THERAPEUTIC ACTIVITIES: CPT

## 2019-07-18 PROCEDURE — 65310000000 HC RM PRIVATE REHAB

## 2019-07-18 PROCEDURE — 97535 SELF CARE MNGMENT TRAINING: CPT

## 2019-07-18 PROCEDURE — 36415 COLL VENOUS BLD VENIPUNCTURE: CPT

## 2019-07-18 RX ORDER — WARFARIN 3 MG/1
3 TABLET ORAL ONCE
Status: COMPLETED | OUTPATIENT
Start: 2019-07-18 | End: 2019-07-18

## 2019-07-18 RX ADMIN — Medication 5000 UNITS: at 08:37

## 2019-07-18 RX ADMIN — BISACODYL 10 MG: 5 TABLET, COATED ORAL at 21:44

## 2019-07-18 RX ADMIN — METHOCARBAMOL TABLETS 500 MG: 500 TABLET, COATED ORAL at 14:32

## 2019-07-18 RX ADMIN — CALCIUM CITRATE 200 MG (950 MG) TABLET 200 MG: at 17:25

## 2019-07-18 RX ADMIN — ACETAMINOPHEN 650 MG: 325 TABLET ORAL at 14:32

## 2019-07-18 RX ADMIN — FERROUS SULFATE TAB 325 MG (65 MG ELEMENTAL FE) 325 MG: 325 (65 FE) TAB at 08:37

## 2019-07-18 RX ADMIN — METHOCARBAMOL TABLETS 500 MG: 500 TABLET, COATED ORAL at 21:44

## 2019-07-18 RX ADMIN — HYDROCODONE BITARTRATE AND ACETAMINOPHEN 1 TABLET: 5; 325 TABLET ORAL at 12:36

## 2019-07-18 RX ADMIN — SIMVASTATIN 10 MG: 10 TABLET, FILM COATED ORAL at 21:44

## 2019-07-18 RX ADMIN — TAMSULOSIN HYDROCHLORIDE 0.4 MG: 0.4 CAPSULE ORAL at 08:38

## 2019-07-18 RX ADMIN — WARFARIN SODIUM 3 MG: 3 TABLET ORAL at 17:25

## 2019-07-18 RX ADMIN — Medication 250 MG: at 08:37

## 2019-07-18 RX ADMIN — CALCIUM CITRATE 200 MG (950 MG) TABLET 200 MG: at 08:37

## 2019-07-18 RX ADMIN — METHOCARBAMOL TABLETS 500 MG: 500 TABLET, COATED ORAL at 06:06

## 2019-07-18 RX ADMIN — HYDROCODONE BITARTRATE AND ACETAMINOPHEN 1 TABLET: 5; 325 TABLET ORAL at 07:50

## 2019-07-18 NOTE — PROGRESS NOTES
Problem: Self Care Deficits Care Plan (Adult)  Goal: *Therapy Goal (Edit Goal, Insert Text)  Description  Occupational Therapy Goals   Short Term Goals= Long Term Goals  Initiated 7/10/19 and to be accomplished within 2 week(s) (To be accomplished by discharge 2019)  1. Pt will perform self-feeding with I. ()  2. Pt will perform grooming with S. (, upgrade to MI)  3. Pt will perform UB bathing with S. (, upgrade to MI)  4. Pt will perform LB bathing with S.  5. Pt will perform tub/shower transfer with S.   6. Pt will perform UB dressing with S. (, upgrade to MI)  7. Pt will perform LB dressing with S.  8. Pt will perform toileting task with [facility]. (, upgrade to SB)  9. Pt will perform toilet transfer with S.    Short Term Goals   Initiated 7/10/19 and to be accomplished within 7 day(s)  (Updated 2019)  1. Pt will perform self-feeding with S. ()  2. Pt will perform grooming with S. ()  3. Pt will perform UB bathing with S. ()  4. Pt will perform LB bathing with [place]. ()  5. Pt will perform tub/shower transfer with [facility]. ()  6. Pt will perform UB dressing with S. ()  7. Pt will perform LB dressing with [place]. ()  8. Pt will perform toileting task with [place]. ()  9. Pt will perform toilet transfer with [facility]. ()       Outcome: Progressing Towards Goal   OCCUPATIONAL THERAPY TREATMENT    Patient: Cayla Wolff.   80 y.o. Patient identified with name and : yes    Date: 2019    First Tx Session  Time In: 0932  Time Out[de-identified] 1100      Diagnosis: Status post open reduction with internal fixation of fracture [Z96.7, Z87.81]   Precautions:  NWB L Le, Total Hip L LE  Chart, occupational therapy assessment, plan of care, and goals were reviewed. Pain:  Pt reports 4-5/10 pain or discomfort prior to treatment. Pt reports 4-5/10 pain or discomfort post treatment. Intervention Provided: Pt provided with pain medicine prior to tx as well as pain patch donned on left hip.  Pt provided with positioning after tx to decrease pain as well as rest breaks. SUBJECTIVE:   Patient stated I knew I wanted to finish the game, I was pushing myself when I knew I was starting to feel like I needed to sit down.     OBJECTIVE DATA SUMMARY:   Pt approached for tx sitting in w/c, propelling self down to therapy gym, pt agreeable to tx. Pt propelled back to room to perform functional transfer training. During tub bench transfer pt requesting to perform toileting, reporting upset stomach, pt performed tub bench>3:1 transfer with RW \"hopping\" on right LE with close SBA. Pt requesting from OT as to why heart rate increased (reporting normally around 75 BPM) during standing task w/ pt provided with education and level of exertion needed for balancing on 1 leg for increased amt of time. Pt stated understanding. Patient Vitals for the past 8 hrs:   Temp Pulse Resp BP SpO2   07/18/19 1045  96  108/67 97 %       THERAPEUTIC ACTIVITY Daily Assessment   Pt performed theract to increase static standing tolerance for engaging in ADLs in standing position (ie washing hands, grooming, etc) Performed standing tolerance task playing connect 4 at raised table w/ use of RW. Pt stood for 6:42 seconds, requiring OT cue to sit 2/2 to pt demo'ing flushed appearance and shallow breathing. Vitals taken (see above) with nursing notified. Pt provided with water and encouraged to consume liquids 2/2 to pt's BP lower than average. Pt reporting feeling better once in seated position and performing PLB x 5. THERAPEUTIC EXERCISE Daily Assessment   Pt performed therex to increase UB strengthening (specifically pectoralis major, traps, and triceps) to increase good postural support with use of RW during ADL/IADL tasks and functional transfers. Seated in w/c pt performed shoulder press with 5# dowel 3 sets of 15.       TOILETING Daily Assessment   Pt required cues to only let go with 1x hand at a time from RW to esther/doff pants over hips during toileting task. Pt able to wipe buttocks with SBA and cues to not forward flex past point of breaking hip precautions. Toileting  Toileting Assistance (FIM Score): 5 (Supervision)(SBA)  Cues: Tactile cues provided;Verbal cues provided  Adaptive Equipment: Elevated seat;Walker       MOBILITY/TRANSFERS Daily Assessment   Pt performed functional transfers (toilet/tub bench) with ped-alert boot donned for auditory feedback for NWB status for Left LE. Functional Transfers  Toilet Transfer : Stand pivot transfer with walker  Amount of Assistance Required: 4 (Contact guard assistance)  Tub or Shower Type: Shower  Amount of Assistance Required: 4 (Contact guard assistance)  Adaptive Equipment: Bedside commode;Grab bars; Tub transfer bench;Walker (comment)       ASSESSMENT:  Pt slowly progressing with standing activity tolerance, though requiring OT cues for engaging in energy conservation and rest breaks. Pt continuing to progress in functional transfer independence. Progression toward goals:  ?          Improving appropriately and progressing toward goals  ? Improving slowly and progressing toward goals  ? Not making progress toward goals and plan of care will be adjusted     PLAN:  Patient continues to benefit from skilled intervention to address the above impairments. Continue treatment per established plan of care. Discharge Recommendations:  Home Health  Further Equipment Recommendations for Discharge:  3:1     Activity Tolerance:  Fair -   Pt requiring, OT initiated, rest breaks after functional transfers and standing tolerance tasks       Estimated LOS: 7/23/2019    Please refer to the flowsheet for vital signs taken during this treatment. After treatment:   ? Patient left in no apparent distress sitting up in wheel chair   ? Call bell left within reach  ? Nursing notified  ?   Chair alarm activated    COMMUNICATION/EDUCATION:   ? Home safety education was provided and the patient/caregiver indicated understanding. ? Patient/family have participated as able in goal setting and plan of care. ? Patient/family agree to work toward stated goals and plan of care. ? Patient understands intent and goals of therapy, but is neutral about his/her participation. ? Patient is unable to participate in goal setting and plan of care.       Makayla Berg, OTR/L

## 2019-07-18 NOTE — PROGRESS NOTES
SHIFT CHANGE NOTE FOR [facility]    Bedside and Verbal shift change report given to Arvind Karimi RN, RN (oncoming nurse) by Evelio Stephen, RN (offgoing nurse). Report included the following information SBAR, Kardex, MAR and Recent Results. Situation:   Code Status: Full Code   Reason for Admission: S/P ORIF  Hospital Day: 9   Problem List:   Hospital Problems  Date Reviewed: 7/17/2019          Codes Class Noted POA    Increased urinary frequency (Chronic) ICD-10-CM: R35.0  ICD-9-CM: 788.41  7/16/2019 Yes        Vitamin D deficiency (Chronic) ICD-10-CM: E55.9  ICD-9-CM: 268.9  7/10/2019 Yes    Overview Signed 7/10/2019 11:23 AM by Charissa Emmanuel MD     Vitamin D 25-Hydroxy (7/10/2019) = 18.6              Sepsis due to Pseudomonas species [facility]) ICD-10-CM: A41.52  ICD-9-CM: 038.43, 995.91  7/5/2019 Yes    Overview Addendum 7/6/2019  8:16 PM by Nas Mendez MD     7/5/19      80 y.o. male recently admitted for femur fracturesent from skilled nursing [facility] for fever to 102.9. Patient was recently seen at [facility] for hip replacement status post fall. Patient endorses retrosternal chest pain that started upon waking this morning has been persistent since without propagating or palliating factors. Patient also endorses increased bilateral lower extremity swelling and difficulty breathing. WBC 29.2  P-87%,  U/A neg  CT  Heterogeneous collection posterior to the greater trochanter, most consistent with a postoperative hematoma. No rim enhancement to suggest abscess  CXR Small bilateral pleural effusions, left greater than right. Mild pulmonary edema.  No consolidation  7/6/19 ORTHO [facility]) Benign appearing left hip and femur now 11 days s/p ORIF periprosthetic hip fracture                         Impaired mobility and ADLs ICD-10-CM: Z74.09  ICD-9-CM: 799.89  7/5/2019 Yes        Acute pulmonary embolism ([facility] [address] ICD-10-CM: I26.99  ICD-9-CM: 415.19  7/1/2019 Yes    Overview Signed 7/9/2019  5:07 PM by Charissa Emmanuel MD     Pulmonary emboli at the periphery of the right main pulmonary artery extending into the middle and lower lobe branches             Anticoagulated on warfarin (Chronic) ICD-10-CM: Z79.01  ICD-9-CM: V58.61  7/1/2019 Yes        * (Principal) Status post open reduction with internal fixation of fracture ICD-10-CM: Z96.7, Z87.81  ICD-9-CM: V45.89, V15.51  6/25/2019 Yes    Overview Signed 7/9/2019  4:52 PM by Anna Uribe MD     S/P Open reduction internal fixation of displaced subtrochanteric periprosthetic fracture around internal prosthetic left hip joint (6/25/2019 - Dr. Farhan West)             Acute blood loss as cause of postoperative anemia ICD-10-CM: D62  ICD-9-CM: 285.1  6/25/2019 Yes        Periprosthetic fracture around [facility] prosthetic [facility] ICD-10-CM: M97. 02XA  ICD-9-CM: 996.44, V43.64  6/24/2019 Yes    Overview Signed 7/9/2019  5:04 PM by Anna Uribe MD     S/P Open reduction internal fixation of displaced subtrochanteric periprosthetic fracture around internal prosthetic left hip joint (6/25/2019 - Dr. Farhan West)             Displaced subtrochanteric fracture of left femur [facility]) ICD-10-CM: C46.45TY  ICD-9-CM: 820.22  6/24/2019 Yes    Overview Signed 7/9/2019  5:02 PM by Anna Uribe MD     S/P Open reduction internal fixation of displaced subtrochanteric periprosthetic fracture around internal prosthetic left hip joint (6/25/2019 - Dr. Farhan West)             History of total left hip arthroplasty ICD-10-CM: S92.837  ICD-9-CM: V43.64  2/18/2019 Yes    Overview Addendum 7/9/2019  5:01 PM by Anna Uribe MD     S/P Left total hip replacement (2/18/2019 - Dr. Farhan West)                   Background:   Past Medical History:   Past Medical History:   Diagnosis Date    Acute blood loss as cause of postoperative anemia 6/25/2019    Acute pulmonary embolism ([facility] [address] 7/1/2019    Pulmonary emboli at the periphery of the right main pulmonary artery extending into the middle and lower lobe branches    Anticoagulated on warfarin 7/1/2019    Benign prostatic hyperplasia with lower urinary tract symptoms     Demand ischemia of myocardium ([facility] [address] 7/6/2019 4/16/18 NST EF 59% , small, reversible apical defect 7/2/19 ECHO EF 68%, LVH 14/16, LAE, RVE, ANDREY 1.4cm2 with 12mm mean gradient    Diverticulosis     Dyslipidemia     Essential tremor     Gastroesophageal reflux disease     History of pericarditis     Obesity, Class I, BMI 30-34.9     Paroxysmal atrial fibrillation ([facility] [address]     CVAL Maddy Saleh FNP 2008  AF ablation 9/11/13 EPS with isolation of RSPV and LIPV 6/4/18 TEDDY  EF 55%, 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 male recently admitted for femur fracturesent from skilled nursing facility for fever to 102.9. Patient was recently seen at [facility] for hip replacement status post fall. Patient endorses retrosternal chest pain that started upon waking this morning has been persistent since without propagating or palliating factors.   Patient also endorses increased bilateral lower extremity s    Sick sinus syndrome ([facility] [address]     Vitamin D deficiency 7/10/2019    Vitamin D 25-Hydroxy (7/10/2019) = 18.6       Patient taking anticoagulants yes Coumadin   Patient has a defibrillator: no     Assessment:   Changes in Assessment throughout shift: no     Patient has central line: no Reasons if yes: na  Insertion date:na Last dressing date:na   Patient has Truong Cath: no Reasons if yes: n/a   Insertion date:n/a     Last Vitals:     Vitals:    07/17/19 0800 07/17/19 1351 07/17/19 1600 07/17/19 2041   BP: 135/65 130/74 124/66 133/66   Pulse: 66 79 73 77   Resp: 18  18 20   Temp: 98.5 °F (36.9 °C)  98.3 °F (36.8 °C) 97.4 °F (36.3 °C)   SpO2: 98% 98% 96% 97%   Weight:       Height:  PAIN    Pain Assessment    Pain Intensity 1: 0 (07/18/19 0400) Pain Intensity 1: 2 (12/29/14 1105)    Pain Location 1: Hip Pain Location 1: Abdomen    Pain Intervention(s) 1: Medication (see MAR) Pain Intervention(s) 1: Medication (see MAR)  Patient Stated Pain Goal: 0 Patient Stated Pain Goal: 0  o Intervention effective: yes    o Other actions taken for pain: no     Skin Assessment  Skin color Skin Color: Appropriate for ethnicity  Condition/Temperature Skin Condition/Temp: Dry, Warm  Integrity Skin Integrity: Incision (comment)  Turgor Turgor: Non-tenting  Weekly Pressure Ulcer Documentation  Pressure  Injury Documentation: No Pressure Injury Noted-Pressure Ulcer Prevention Initiated  Wound Prevention & Protection Methods  Orientation of wound Orientation of Wound Prevention: Posterior  Location of Prevention Location of Wound Prevention: Sacrum/Coccyx  Dressing Present Dressing Present : No  Dressing Status    Wound Offloading Wound Offloading (Prevention Methods): Bed, pressure redistribution/air     INTAKE/OUPUT  Date 07/17/19 0700 - 07/18/19 0659 07/18/19 0700 - 07/19/19 0659   Shift 5603-0232 5143-6934 24 Hour Total 0553-8190 8433-2904 24 Hour Total   INTAKE   P.O. 1440  1440        P. O. 1440  1440      Shift Total(mL/kg) 1440(13.9)  1440(13.9)      OUTPUT   Urine(mL/kg/hr)           Urine Occurrence(s) 5 x 2 x 7 x      Emesis/NG output           Emesis Occurrence(s) 0 x  0 x      Stool           Stool Occurrence(s) 1 x 0 x 1 x      Shift Total(mL/kg)         NET 1440  1440      Weight (kg) 103.4 103.4 103.4 103.4 103.4 103.4       Recommendations:  1. Patient needs and requests: addressed    2. Diet: cardiac    3. Pending tests/procedures: none     4. Functional Level/Equipment: whellchair    5.  Estimated Discharge Date: TBD Posted on Whiteboard in Patients Room: no     HEALS Safety Check    A safety check occurred in the patient's room between off going nurse and oncoming nurse listed above.    The safety check included the below items  Area Items   H  High Alert Medications - Verify all high alert medication drips (heparin, PCA, etc.)   E  Equipment - Suction is set up for ALL patients (with presley)  - Red plugs utilized for all equipment (IV pumps, etc.)  - WOWs wiped down at end of shift.  - Room stocked with oxygen, suction, and other unit-specific supplies   A  Alarms - Bed alarm is set for fall risk patients  - Ensure chair alarm is in place and activated if patient is up in a chair   L  Lines - Check IV for any infiltration  - Truong bag is empty if patient has a Truong   - Tubing and IV bags are labeled   S  Safety   - Room is clean, patient is clean, and equipment is clean. - Hallways are clear from equipment besides carts. - Fall bracelet on for fall risk patients  - Ensure room is clear and free of clutter  - Suction is set up for ALL patients (with presley)  - Hallways are clear from equipment besides carts.    - Isolation precautions followed, supplies available outside room, sign posted

## 2019-07-18 NOTE — PROGRESS NOTES
Problem: Mobility Impaired (Adult and Pediatric)  Goal: *Acute Goals and Plan of Care (Insert Text)  Description  Physical Therapy Goals  Short Term Goals = Long Term Goals  Initiated 7/10/2019 and to be accomplished within 2-3 weeks (2019-2019)  1. Patient will move from supine to sit and sit to supine , scoot up and down and roll side to side in bed with modified independence. 2.  Patient will transfer from bed to chair and chair to bed with close supervision/set-up using the least restrictive device. 3.  Patient will perform sit to stand with close supervision/set-up. 4.  Patient will ambulate with close supervision/set-up for 10 feet with RW maintaining NWB left LE. 5.  Patient will propel w/c over level surfaces greater than 150 feet and over entryways, thresholds, and ramps with modified independence. Outcome: Progressing Towards Goal     PHYSICAL THERAPY TREATMENT    Patient: Yasmani Zimmer (80 y.o. male)  Date: 2019  Diagnosis: Status post open reduction with internal fixation of fracture [Z96.7, Z87.81] Status post open reduction with internal fixation of fracture  Precautions: Fall Risk Precautions, NWB Left LE, THP Left LE  Chart, physical therapy assessment, plan of care and goals were reviewed. Time In: 1130  Time Out: 1230  Patient Seen For: Balance activities;Gait training;Transfer training;Patient education   Time In: 1403  Time Out: 1433  Patient Seen For:  Pain:  First Treatment Session:  Pt pain was reported as 6-7/10 pre-treatment. Pt pain was reported as 7/10 post-treatment. Intervention: Pt notified nursing staff. Second Treatment Session:  Pt reports 7/10 pain with nursing staff present and attending to pt's needs when pt indicated he would like pain medication. Patient identified with name and : yes    SUBJECTIVE:     Pt is pleasant and cooperative but notes he felt, \"a little lightheaded,\" during stand step transfer from w/c to mat.   Pt reports having stood for 7 minutes with OT earlier and noted his BP had dropped. Assessed pt's BP prior to transfer and it was 108/72 mmHg. After transfer, pt's BP was 122/73 mmHg. Throughout remainder of treatment, pt did no report c/o feeling lightheaded. During second treatment session, both pt and his wife were agreeable to participate in training session and express comfort with education provided. Nursing staff notified that pt's wife is safe to assist pt in room with basic functional transfers as she demonstrates ability to safely assist pt after multiple training sessions. OBJECTIVE DATA SUMMARY:   Objective:     BED/MAT MOBILITY Daily Assessment    Supine to Sit : 5 (Supervision) with mod to max verbal cues for maintaining left LE THP  Sit to Supine : 4 (Minimal assistance) for left LE management from dependent position onto mat table       TRANSFERS Daily Assessment    Other: stand step with RW  Transfer Assistance : 4 (Contact guard assistance)  Sit to Stand Assistance: Contact guard assistance   Pt requires CGA for balance and safety with functional transfers with intermittent verbal reminders for left LE positioning and safe hand placement/transition. During PM Session, pt's wife participated in teach back transfer training with pt and wife performing two transfers w/c <> mat table including set up of w/c for safe transfers. Pt and pt's wife demonstrated ability to work together with minimal feedback from PT for first transfer re: w/c management. BALANCE Daily Assessment    Sitting - Static: Good (unsupported)  Sitting - Dynamic: Good (unsupported)  Standing - Static: Poor  Standing - Dynamic : Impaired       WHEELCHAIR MOBILITY Daily Assessment    Pt propels w/c on unit with modified independence.        THERAPEUTIC EXERCISES Daily Assessment    Exercise Type #1: Supine lower extremity strengthening  Sets Performed: 3  Reps Performed: 10  Right and Left Ankle Pumps  B Quad Sets with 5\" Holds  B Hip Abd/Add (to neutral) with mod assist from PT to maintain neutral rotation of left hip as pt demonstrates increased external rotation. Exercise Type #2: Seated lower extremity strengthening  Sets Performed: 3  Reps Performed: 10     Pt attempted to participate in w/c pushups while fully elevating left foot off of floor during second treatment session. However, pt was able to complete only one repetition due to fatigue. ASSESSMENT:  Pt is progressing with functional mobility requiring decreased physical assistance for managing left LE from bed to dependent position without use of leg . Pt and pt's wife progressed together with family teaching and have demonstrated ability to safely perform functional transfers with wife providing CGA. Progression toward goals:  x      Improving appropriately and progressing toward goals  ? Improving slowly and progressing toward goals  ? Not making progress toward goals and plan of care will be adjusted     PLAN:  Patient continues to benefit from skilled intervention to address the above impairments. Continue treatment per established plan of care. Emphasize functional strength training to promote increased safety and independence. Pt's wife agreeable to participate in car transfer training on Monday July 22nd 2019. Discharge Recommendations:  Home Health Physical Therapy with 24 hour assistance  Further Equipment Recommendations for Discharge:  wheelchair 18 inch with gel cushion and removable and elevating leg rests and swing away arm rests, and 32\" transfer board     Estimated LOS: 7/23/2019    Activity Tolerance:   Fair - pt requires rest breaks throughout treatment sessions. Please refer to the flowsheet for vital signs taken during this treatment. After treatment:   Patient left self propelling w/c from gym to dining room following both treatment sessions.       Irish Pinto PT, DPT  7/18/2019

## 2019-07-18 NOTE — PROGRESS NOTES
[facility] PHYSICAL REHABILITATION  [address] [place] [address], [address]     INPATIENT REHABILITATION  DAILY PROGRESS NOTE     Date: 7/18/2019    Name: Mauricio Romo. Age / Sex: 80 y.o. / male   CSN: [id number] MRN: [id number]   Admit Date: 7/9/2019 Length of Stay: 9 days     Primary Rehab Diagnosis: Impaired Mobility and ADLs secondary to:  1. S/P Open reduction internal fixation of displaced subtrochanteric periprosthetic fracture around internal prosthetic left hip joint (6/25/2019 - Dr. Rafael Smith)  2. History of displaced subtrochanteric periprosthetic fracture of left femur around internal prosthetic left hip joint; S/P Left total hip replacement (2/18/2019 - Dr. Rafael Smith)      Subjective:     Patient seen and examined. Blood pressure controlled. Team conference was held at bedside this PM.     Patient's Complaint:   No significant medical complaints     Pain Control: ongoing significant pain in which is stable and controlled by current meds      Objective:     Vital Signs:  Patient Vitals for the past 24 hrs:   BP Temp Pulse Resp SpO2   07/18/19 0735 125/63 97.7 °F (36.5 °C) 63 16 95 %   07/17/19 2041 133/66 97.4 °F (36.3 °C) 77 20 97 %   07/17/19 1600 124/66 98.3 °F (36.8 °C) 73 18 96 %   07/17/19 1351 130/74  79  98 %        Physical Examination:  GENERAL SURVEY: Patient is awake, alert, oriented x 3, sitting comfortably on the chair, not in acute respiratory distress.   HEENT: pale palpebral conjunctivae, anicteric sclerae, no nasoaural discharge, moist oral mucosa  NECK: supple, no jugular venous distention, no palpable lymph nodes  CHEST/LUNGS: symmetrical chest expansion, good air entry, clear breath sounds  HEART: adynamic precordium, good S1 S2, no S3, regular rhythm, no murmurs  ABDOMEN: obese, bowel sounds appreciated, soft, non-tender  EXTREMITIES: pale nailbeds, no edema, full and equal pulses, no calf tenderness   NEUROLOGICAL EXAM: The patient is awake, alert and oriented x3, able to answer questions fairly appropriately, able to follow 1 and 2 step commands. Able to tell time from the wall clock. Cranial nerves II-XII are grossly intact. No gross sensory deficit. Motor strength is 4+/5 on BUE, 4 to 4+/5 on the RLE, 2-/5 on the LLE (except 3- on the left ankle).      Incision(s): healing well, clean, dry, and intact      Current Medications:  Current Facility-Administered Medications   Medication Dose Route Frequency    diphenhydrAMINE (BENADRYL) capsule 25 mg  25 mg Oral QHS PRN    tamsulosin (FLOMAX) capsule 0.4 mg  0.4 mg Oral DAILY    diclofenac (FLECTOR) 1.3 % transdermal patch 1 Patch  1 Patch TransDERmal Q12H    cholecalciferol (VITAMIN D3) capsule 5,000 Units  5,000 Units Oral DAILY    calcium citrate tablet 200 mg [elemental]  200 mg Oral BID    warfarin - physician to dose   Other Q24H    ferrous sulfate tablet 325 mg  1 Tab Oral DAILY WITH BREAKFAST    ascorbic acid (vitamin C) (VITAMIN C) tablet 250 mg  250 mg Oral DAILY WITH BREAKFAST    methocarbamol (ROBAXIN) tablet 500 mg  500 mg Oral Q8H    acetaminophen (TYLENOL) tablet 650 mg  650 mg Oral Q4H PRN    bisacodyl (DULCOLAX) tablet 10 mg  10 mg Oral Q48H PRN    HYDROcodone-acetaminophen (NORCO) 5-325 mg per tablet 1 Tab  1 Tab Oral Q4H PRN    simvastatin (ZOCOR) tablet 10 mg  10 mg Oral QHS       Allergies:  No Known Allergies      Functional Progress:    OCCUPATIONAL THERAPY    ON ADMISSION MOST RECENT   Eating  Functional Level: 5   Eating  Functional Level: 7     Grooming  Functional Level: 5   Grooming  Functional Level: 5     Bathing  Functional Level: 3(supine/ HOB raised)   Bathing  Functional Level: 5     Upper Body Dressing  Functional Level: 5   Upper Body Dressing  Functional Level: 5     Lower Body Dressing  Functional Level: 3   Lower Body Dressing  Functional Level: 4     Toileting  Functional Level: 1   Toileting  Functional Level: 5     Toilet Transfers  Toilet Transfer Score: 3(using FWW)   Toilet Transfers  Toilet Transfer Score: 5     Tub /Shower Transfers  Tub/Shower Transfer Score: 0   Tub/Shower Transfers  Tub/Shower Transfer Score: 4       Legend:   7 - Independent   6 - Modified Independent   5 - Standby Assistance / Supervision / Set-up   4 - Minimum Assistance / Contact Guard Assistance   3 - Moderate Assistance   2 - Maximum Assistance   1 - Total Assistance / Dependent       Lab/Data Review:  Recent Results (from the past 24 hour(s))   HGB & HCT    Collection Time: 07/18/19  6:26 AM   Result Value Ref Range    HGB 9.3 (L) 13.0 - 16.0 g/dL    HCT 29.6 (L) 36.0 - 48.0 %   PROTHROMBIN TIME + INR    Collection Time: 07/18/19  6:26 AM   Result Value Ref Range    Prothrombin time 28.2 (H) 11.5 - 15.2 sec    INR 2.6 (H) 0.8 - 1.2         Estimated Glomerular Filtration Rate:  On admission, estimated GFR based on a Creatinine of 0.89 mg/dl:              Using CKD-EPI = 78.5 mL/min/1.73m2              Using MDRD = 86.6 mL/min/1.73m2      Assessment:     Primary Rehabilitation Diagnosis  1. Impaired Mobility and ADLs  2. S/P Open reduction internal fixation of displaced subtrochanteric periprosthetic fracture around internal prosthetic left hip joint (6/25/2019 - Dr. Ti De León)  3.  History of displaced subtrochanteric periprosthetic fracture of left femur around internal prosthetic left hip joint; S/P Left total hip replacement (2/18/2019 - Dr. 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    Mild aortic stenosis I35.0    Acute blood loss as cause of postoperative anemia D62    Acute pulmonary embolism  I26.99    Anticoagulated on warfarin Z79.01    Primary osteoarthritis involving multiple joints M15.0    Gastroesophageal reflux disease K21.9    Dyslipidemia E78.5  Diverticulosis K57.90    History of deep vein thrombosis of lower extremity Z86.718    Sick sinus syndrome  I49.5    Benign prostatic hyperplasia with lower urinary tract symptoms N40.1    Tremor R25.1    Obesity, Class I, BMI 30-34.9 E66.9    Vitamin D deficiency E55.9    Essential tremor G25.0    History of pericarditis Z86.79        Plan:     1. Justification for continued stay: Good progression towards established rehabilitation goals. 2. Medical Issues being followed closely:    [x]  Fall and safety precautions     [x]  Wound Care     [x]  Bowel and Bladder Function     [x]  Fluid Electrolyte and Nutrition Balance     [x]  Pain Control      3. Issues that 24 hour rehabilitation nursing is following:    [x]  Fall and safety precautions     [x]  Wound Care     [x]  Bowel and Bladder Function     [x]  Fluid Electrolyte and Nutrition Balance     [x]  Pain Control      [x]  Assistance with and education on in-room safety with transfers to and from the bed, wheelchair, toilet and shower. 4. Acute rehabilitation plan of care:    [x]  Continue current care and rehab. [x]  Physical Therapy           [x]  Occupational Therapy           []  Speech Therapy     []  Hold Rehab until further notice     5. Medications:    [x]  MAR Reviewed     [x]  Continue Present Medications     6. DVT Prophylaxis:      []  Lovenox     []  Unfractionated Heparin     [x]  Coumadin     []  NOAC     []  WINDY Stockings     []  Sequential Compression Device     []  None     7. Orders:   > S/P Open reduction internal fixation of displaced subtrochanteric periprosthetic fracture around internal prosthetic left hip joint (6/25/2019 - Dr. Cristal Shelby);  History of displaced subtrochanteric periprosthetic fracture of left femur around internal prosthetic left hip joint; S/P Left total hip replacement (2/18/2019 - Dr. Cristal Shelby)              > Nonweightbearing on the left lower extremity              > Total hip precautions on left hip              > Staples were removed on 7/9/2019               > On 7/10/2019, started Calcium citrate 1 tab PO BID   > Continue Calcium citrate 1 tab PO BID     > Acute Postoperative Blood Loss Anemia              > Hgb/Hct (7/10/2019, on admission) = 8.8/28.0              > Anemia work-up showed serum iron 40, TIBC 237, iron % saturation 17, ferritin 568, reticulocyte count 5.0   > On 7/10/2019, started FeSO4 and Ascorbic acid   > Hgb/Hct (7/15/2019) = 9.3/29.5    > Hgb/Hct (7/18/2019) = 9.3/29.6    > Continue:               > FeSO4 325 mg PO once daily with breakfast                > Ascorbic Acid 250 mg PO once daily with breakfast (to enhance the absorption of the FeSO4)      > Acute pulmonary embolism, anticoagulated on Warfarin              > Target INR = 2 to 3              > INR (7/18/2019) = 2.6              > Patient received Warfarin 2.5 mg PO last night               > Warfarin 3 mg PO tonight                > Sepsis due to Pseudomonas aeruginosa              > Blood culture (7/5/2019; 1 out of 2 sets) yielded growth of PSeudomonas aeruginosa (PANSENSITIVE)              > On 7/5/2019, patient was empirically started on Piperacillin-Tazobactam 4.5 grams IV q 6 hr and a single dose of Vancomycin IV was given              > On 7/9/2019, Piperacillin-Tazobactam IV was discontinued and patient was discharged from [facility] on oral Ciprofloxacin 500 mg PO q 12 hr x 4 days              > On 7/13/2019, patient had completed the recommended 4-day treatment course of Ciprofloxacin 500 mg PO q 12 hr      > Vitamin D Deficiency              > Vitamin D 25-Hydroxy (7/10/2019) = 18.6              > On 7/10/2019, patient was given Cholecalciferol 50,000 units PO x 1 dose               > On 7/11/2019, started Cholecalciferol 5,000 units PO once daily   > Continue Cholecalciferol 5,000 units PO once daily     > Increased urinary frequency   > Patient stated he has had increased urinary frequency even prior to admission to the acute care hospital; patient inquired of options of treatment for increased frequency / possible prostate issues; patient stated he tried Tamsulosin in the past with no reported adverse reactions   > On 7/17/2019, started a trial of Tamsulosin 0.4 mg PO once daily   > Continue Tamsulosin 0.4 mg PO once daily    > Difficulty sleeping   > On 7/17/2019, added Diphenhydramine 25 mg PO q HS PRN for sleep   > Continue Diphenhydramine 25 mg PO q HS PRN for sleep    > Analgesia   > On 7/15/2019, added Diclofenac 1.3% transdermal patch, 1 patch on skin of left thigh q 12 hr   > Continue:               > Acetaminophen 650 mg PO q 4 hr PRN for pain level less than 5/10    > Diclofenac 1.3% transdermal patch, 1 patch on skin of left thigh q 12 hr               > Methocarbamol 500 mg PO q 8 hr                > Norco 5/325 1 tab PO q 4 hr PRN for pain level greater than 4/10      > Diet:              > Specifications: Low saturated fat              > Solids (consistency): Regular               > Liquids (consistency): Thin       8. Patient's progress in rehabilitation and medical issues discussed with the patient. All questions answered to the best of my ability. Care plan discussed with patient and nurse.       Signed:    Lianna Gilmore MD    July 18, 2019

## 2019-07-18 NOTE — INTERDISCIPLINARY ROUNDS
[address]  [address] [place] [address], [address]    [address] SUMMARY     Date of Conference: 7/18/2019    Patient Information:        Name: Zheng Grewal. Age / Sex: 80 y.o. / male   CSN: [id number] MRN: [id number]   Admit Date: 7/9/2019 Length of Stay: 9 days     Primary Rehabilitation Diagnosis  1. Impaired Mobility and ADLs  2. S/P Open reduction internal fixation of displaced subtrochanteric periprosthetic fracture around internal prosthetic left hip joint (6/25/2019 - Dr. Mellissa Zapien)  3.  History of displaced subtrochanteric periprosthetic fracture of left femur around internal prosthetic left hip joint; S/P Left total hip replacement (2/18/2019 - Dr. 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    Mild aortic stenosis I35.0    Acute blood loss as cause of postoperative anemia D62    Acute pulmonary embolism  I26.99    Anticoagulated on warfarin Z79.01    Primary osteoarthritis involving multiple joints M15.0    Gastroesophageal reflux disease K21.9    Dyslipidemia E78.5    Diverticulosis K57.90    History of deep vein thrombosis of lower extremity Z86.718    Sick sinus syndrome  I49.5    Benign prostatic hyperplasia with lower urinary tract symptoms N40.1    Tremor R25.1    Obesity, Class I, BMI 30-34.9 E66.9    Vitamin D deficiency E55.9    Essential tremor G25.0    History of pericarditis Z86.79          Therapy:     FIM SCORES Initial Assessment Weekly Progress Assessment 7/18/2019   Eating Functional Level: 5     Swallowing     Grooming 5     Bathing 3(supine/ HOB raised)        Upper Body Dressing Functional Level: 5  Items Applied/Steps Completed: Pullover (4 steps)  Comments: seated e     Lower Body Dressing Functional Level: 3  Items Applied/Steps Completed: Elastic waist pants (3 steps)  Comments: thread BLE and assit to pull up to thigh     Toileting Functional Level: 1 Toileting Assistance (FIM Score): 5 (Supervision)(SBA)  Cues: Tactile cues provided;Verbal cues provided  Adaptive Equipment: Elevated seat;Walker   Bladder  level of assist 3 3   Bladder  accident frequency score 3 6   Bowel  level of assist 3 3   Bowel  accident frequency score 3     Toilet Transfer [place] Toilet Transfer Score: 3(using FWW) 4 (Contact guard assistance)   Tub/Shower Transfer [place] Tub or Shower Type: Shower  Tub/Shower Transfer Score: 0 Shower  4 (Contact guard assistance)   Comprehension Primary Mode of Comprehension: Auditory  Score: 5 Auditory  7   Expression Primary Mode of Expression: Verbal  Score: 5 Verbal  7   Social Interaction Score: 5 6   Problem Solving Score: 5 7   Memory Score: 5 6     FIM SCORES Initial Assessment Weekly Progress Assessment 7/18/2019   Bed/Chair/Wheelchair Transfers Other: stand step with RW  Transfer Assistance : 3 (Moderate assistance )  Sit to Stand Assistance:  Moderate assistance  Car Transfers: Not tested  Car Type: N/A Other: stand step with RW  Transfer Assistance : 4 (Contact guard assistance)  Sit to Stand Assistance: Contact guard assistance   Bed Mobility Rolling Right 4 (Minimal assistance)   Rolling Left 4 (Minimal assistance)   Supine to Sit 3 (Moderate assistance)   Sit to Stand Moderate assistance   Sit to Supine 3 (Moderate assistance)    Rolling Right       Rolling Left       Supine to Sit   4 (Minimal assistance)   Sit to Stand   Contact guard assistance   Sit to Supine   4 (Minimal assistance)      Locomotion (W/C) Able to Propel (ft): 150 feet  Functional Level: 4  Curbs/Ramps Assist Required (FIM Score): 0 (Not tested)  Wheelchair Setup Assist Required : 3 (Moderate assistance)  Wheelchair Management: Manages left brake, Manages right brake Function Setup Assistance         Locomotion (W/C distance) 150 Feet 150 Feet   Locomotion (Walk) 1 (Dependent/total assistance)        Locomotion (Walk dist.) 0 Feet (ft)     Steps/Stairs Steps/Stairs Ambulated (#): 0  Level of Assist : 0 (Not tested)  Rail Use: None Steps/Stairs Ambulated (#): 0  Level of Assist : 0 (Not tested)  Rail Use: None         Nursing:     Neuro:   AAA&O x 4           Respiratory:   [x] WNL   [] O2 LPM:   Other:  Peripheral Vascular:   [] TEDS present   [] Edema present ____ Grade   Cardiac:   [x] WNL   [] Other  Genitourinary:   [x] continent   [] incontinent   [] jasso  Abdominal _7/17/19_____ LBM  GI: _Reg cardiac______ Diet _thin___ Liquids _____ tube feeds  Musculoskeletal: ____ ROM Transfers _____ Assistive Device Used  min____ Level of Assistance  Skin Integumentary:   [x] Intact   [] Not Intact   __________Preventative Measures  Details______________________________________________________________  Pain: [x] Controlled   [] Not Controlled   Pain Meds:   [] Scheduled   [] PRN        Registered Dietitian / Nutrition:     Patient Vitals for the past 100 hrs:   % Diet Eaten   07/18/19 0903 95 %   07/17/19 1324 75 %   07/17/19 0940 75 %   07/16/19 1738 75 %   07/16/19 1307 75 %   07/16/19 0944 75 %   07/14/19 1814 75 %   07/14/19 1315 95 %     Pt reported appetite and meal intake good, improving. Tolerating diet. Consuming Ensure supplements; pt reported liking Vanilla flavor more now. Supplements:          [x] Yes: Ensure Enlive TID   [] No      Amount of supplement consumed:  100%      Intake/Output Summary (Last 24 hours) at 7/18/2019 1349  Last data filed at 7/18/2019 0903  Gross per 24 hour   Intake 1080 ml   Output    Net 1080 ml                                Last bowel movement: 7/14      Interdisciplinary Team Goals:     1.  Discipline  Physical Therapy    Goal  Encourage pt to perform functional transfers with RW with supervision maintaining left LE NWB consistently without verbal reminders    Barrier  Impaired functional strength and balance, NWB left LE    Intervention  Education, Transfer training, Functional strength and balance training    Goal written by:   Tommy Cordova PT, DPT     2. Discipline  Occupational Therapy    Goal  Pt will increase functional transfer independence <>TTB with supervision and maintaining NWB status w/ no more than 1x v/c for success. Barrier  Decreased UB strength, decreased (I) in functional transfers, fatigue, NWB L LE, impulsiveness during sit<>stand     Intervention  Therex, education , ped-alert for auditory feedback, repetition training     Goal written by:  Goran Mayen, OTR/L     3. Discipline  Speech Therapy    Goal      Barrier      Intervention      Goal written by:       4. Discipline  Nursing    Goal  Pt. Pain level will be be less than < 3 during therapy sessions. Barrier  s/p Total Hip replacement. Intervention  Assess pain level  frequently, and  Premedicate pt. At least 30 min. Prior to therapy session. Encourage proper repositioning  Techniques and diversion activities. Goal written by:  Go Portillo RN     5. Discipline  Clinical Psychology    Goal  Maintain mood stability and hopefulness with decreased worry and anxiety    Barrier  Situational stress and disposition uncertainty    Intervention  Patient/spouse education and support     Goal written by:  Mandi aRm, PhD     6. Discipline  Nutrition / Dietetics    Goal  Po intake of meals will meet >75% of patient estimated nutritional needs within the next 7 days. Outcome:  [] Met/Ongoing    [x] Progressing towards goal    [] Not progressing towards goal    [] New/Initial goal     Barrier  decreased appetite, now improving    Intervention  continue po diet. Continue nutrition supplements; modify flavor preference    Goal written by:  Analisa Smart RD       Disposition / Discharge Planning:      Follow-up services:  [x] Physical Therapy             [x] Occupational Therapy       [] Speech Therapy           [] Skilled Nursing      [] Medical Social Worker   [] Aide        [] Outpatient     [x] [facility] with 24 hour assistance   [] [address]   [] [facility]   DME recommendations:  18\" w/c with gel cushion and 32\" transfer board, 3:1 commode    Estimated discharge date:  7/23/19   Discharge Location:  [x] Home   [] [facility]   [] TBD             [] Other:          Interdisciplinary team rounds were held this PM with the following team members:       Name   Physical Therapist    Chris Richards, PT, DPT   Occupational Therapist    Sacha Goodson, OTR/L   Recreational Therapist    Mendel Alpers, [address] RN   Physician    Aidan Balderas MD        Yudelka Weeks RN       Signed:  Aidan Balderas MD    July 18, 2019

## 2019-07-18 NOTE — PROGRESS NOTES
SHIFT CHANGE NOTE FOR [facility]    Bedside and Verbal shift change report given to Ayanna Vital, RN, RN (oncoming nurse) by Niya Cardenas, RN (offgoing nurse). Report included the following information SBAR, Kardex, MAR and Recent Results. Situation:   Code Status: Full Code   Reason for Admission: S/P ORIF  Hospital Day: 9   Problem List:   Hospital Problems  Date Reviewed: 7/18/2019          Codes Class Noted POA    Increased urinary frequency (Chronic) ICD-10-CM: R35.0  ICD-9-CM: 788.41  7/16/2019 Yes        Vitamin D deficiency (Chronic) ICD-10-CM: E55.9  ICD-9-CM: 268.9  7/10/2019 Yes    Overview Signed 7/10/2019 11:23 AM by Anna Uribe MD     Vitamin D 25-Hydroxy (7/10/2019) = 18.6              Sepsis due to Pseudomonas species [facility]) ICD-10-CM: A41.52  ICD-9-CM: 038.43, 995.91  7/5/2019 Yes    Overview Addendum 7/6/2019  8:16 PM by Omar Parks MD     7/5/19      80 y.o. male recently admitted for femur fracturesent from skilled nursing facility for fever to 102.9. Patient was recently seen at [facility] for hip replacement status post fall. Patient endorses retrosternal chest pain that started upon waking this morning has been persistent since without propagating or palliating factors. Patient also endorses increased bilateral lower extremity swelling and difficulty breathing. WBC 29.2  P-87%,  U/A neg  CT  Heterogeneous collection posterior to the greater trochanter, most consistent with a postoperative hematoma. No rim enhancement to suggest abscess  CXR Small bilateral pleural effusions, left greater than right. Mild pulmonary edema.  No consolidation  7/6/19 ORTHO ([facility]) Benign appearing left hip and femur now 11 days s/p ORIF periprosthetic hip fracture                         Impaired mobility and ADLs ICD-10-CM: Z74.09  ICD-9-CM: 799.89  7/5/2019 Yes        Acute pulmonary embolism ([address] ICD-10-CM: I26.99  ICD-9-CM: 415.19  7/1/2019 Yes    Overview Signed 7/9/2019  5:07 PM by Sunday Hubbard MD     Pulmonary emboli at the periphery of the right main pulmonary artery extending into the middle and lower lobe branches             Anticoagulated on warfarin (Chronic) ICD-10-CM: Z79.01  ICD-9-CM: V58.61  7/1/2019 Yes        * (Principal) Status post open reduction with internal fixation of fracture ICD-10-CM: Z96.7, Z87.81  ICD-9-CM: V45.89, V15.51  6/25/2019 Yes    Overview Signed 7/9/2019  4:52 PM by Sunday Hubbard MD     S/P Open reduction internal fixation of displaced subtrochanteric periprosthetic fracture around internal prosthetic left hip joint (6/25/2019 - Dr. Lori Robbins)             Acute blood loss as cause of postoperative anemia ICD-10-CM: D62  ICD-9-CM: 285.1  6/25/2019 Yes        Periprosthetic fracture around internal prosthetic left hip joint ([facility] ICD-10-CM: M97. 02XA  ICD-9-CM: 996.44, V43.64  6/24/2019 Yes    Overview Signed 7/9/2019  5:04 PM by Sunday Hubbard MD     S/P Open reduction internal fixation of displaced subtrochanteric periprosthetic fracture around internal prosthetic left hip joint (6/25/2019 - Dr. Lori Robbins)             Displaced subtrochanteric fracture of left femur [facility]) ICD-10-CM: D53.27WZ  ICD-9-CM: 820.22  6/24/2019 Yes    Overview Signed 7/9/2019  5:02 PM by Sunday Hubbard MD     S/P Open reduction internal fixation of displaced subtrochanteric periprosthetic fracture around internal prosthetic left hip joint (6/25/2019 - Dr. Lori Robbins)             History of total left hip arthroplasty ICD-10-CM: F77.294  ICD-9-CM: V43.64  2/18/2019 Yes    Overview Addendum 7/9/2019  5:01 PM by Sunday Hubbard MD     S/P Left total hip replacement (2/18/2019 - Dr. Lori Robbins)                   Background:   Past Medical History:   Past Medical History:   Diagnosis Date    Acute blood loss as cause of postoperative anemia 6/25/2019    Acute pulmonary embolism ([address] 7/1/2019    Pulmonary emboli at the periphery of the right main pulmonary artery extending into the middle and lower lobe branches    Anticoagulated on warfarin 7/1/2019    Benign prostatic hyperplasia with lower urinary tract symptoms     Demand ischemia of myocardium ([address] 7/6/2019 4/16/18 NST EF 59% , small, reversible apical defect 7/2/19 ECHO EF 68%, LVH 14/16, LAE, RVE, ANDREY 1.4cm2 with 12mm mean gradient    Diverticulosis     Dyslipidemia     Essential tremor     Gastroesophageal reflux disease     History of pericarditis     Obesity, Class I, BMI 30-34.9     Paroxysmal atrial fibrillation ([address]     CVAL Maddy Saleh FNP 2008  AF ablation 9/11/13 EPS with isolation of RSPV and LIPV 6/4/18 TEDDY  EF 55%, 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 male recently admitted for femur fracturesent from skilled nursing facility for fever to 102.9. Patient was recently seen at [facility] for hip replacement status post fall. Patient endorses retrosternal chest pain that started upon waking this morning has been persistent since without propagating or palliating factors.   Patient also endorses increased bilateral lower extremity s    Sick sinus syndrome ([address]     Vitamin D deficiency 7/10/2019    Vitamin D 25-Hydroxy (7/10/2019) = 18.6       Patient taking anticoagulants yes Coumadin   Patient has a defibrillator: no     Assessment:   Changes in Assessment throughout shift: no     Patient has central line: no Reasons if yes: na  Insertion date:na Last dressing date:na   Patient has Truong Cath: no Reasons if yes: n/a   Insertion date:n/a     Last Vitals:     Vitals:    07/17/19 2041 07/18/19 0735 07/18/19 1045 07/18/19 1515   BP: 133/66 125/63 108/67 112/64   Pulse: 77 63 96 81   Resp: 20 16  18   Temp: 97.4 °F (36.3 °C) 97.7 °F (36.5 °C)  97.2 °F (36.2 °C)   SpO2: 97% 95% 97% 97%   Weight:       Height:  PAIN    Pain Assessment    Pain Intensity 1: 6 (07/18/19 1515) Pain Intensity 1: 2 (12/29/14 1105)    Pain Location 1: Leg, Hip Pain Location 1: Abdomen    Pain Intervention(s) 1: Medication (see MAR) Pain Intervention(s) 1: Medication (see MAR)  Patient Stated Pain Goal: 0 Patient Stated Pain Goal: 0  o Intervention effective: yes    o Other actions taken for pain: no     Skin Assessment  Skin color Skin Color: Appropriate for ethnicity  Condition/Temperature Skin Condition/Temp: Warm, Dry  Integrity Skin Integrity: Incision (comment)  Turgor Turgor: Non-tenting  Weekly Pressure Ulcer Documentation  Pressure  Injury Documentation: No Pressure Injury Noted-Pressure Ulcer Prevention Initiated  Wound Prevention & Protection Methods  Orientation of wound Orientation of Wound Prevention: Posterior  Location of Prevention Location of Wound Prevention: Buttocks, Sacrum/Coccyx  Dressing Present Dressing Present : No  Dressing Status    Wound Offloading Wound Offloading (Prevention Methods): Adaptive equipment, Bed, pressure reduction mattress, Chair cushion, Pillows, Repositioning, Turning, Alto Krishna, Wheelchair     INTAKE/OUPUT  Date 07/17/19 1900 - 07/18/19 0659 07/18/19 0700 - 07/19/19 0659   Shift 9861-7236 24 Hour Total 9376-7812 6902-4462 24 Hour Total   INTAKE   P.O.  1440 880  880     P. O.  1440 880  880   Shift Total(mL/kg)  1440(13.9) 880(8.5)  880(8.5)   OUTPUT   Urine(mL/kg/hr)          Urine Occurrence(s) 2 x 7 x 1 x 1 x 2 x   Emesis/NG output          Emesis Occurrence(s)  0 x      Stool          Stool Occurrence(s) 0 x 1 x 1 x 1 x 2 x   Shift Total(mL/kg)        NET  1440 880  880   Weight (kg) 103.4 103.4 103.4 103.4 103.4       Recommendations:  1. Patient needs and requests: addressed    2. Diet: cardiac    3. Pending tests/procedures: none     4. Functional Level/Equipment: whellchair    5.  Estimated Discharge Date: TBD Posted on Whiteboard in Patients Room: no     [facility] Safety Check    A safety check occurred in the patient's room between off going nurse and oncoming nurse listed above. The safety check included the below items  Area Items   H  High Alert Medications - Verify all high alert medication drips (heparin, PCA, etc.)   E  Equipment - Suction is set up for ALL patients (with presley)  - Red plugs utilized for all equipment (IV pumps, etc.)  - WOWs wiped down at end of shift.  - Room stocked with oxygen, suction, and other unit-specific supplies   A  Alarms - Bed alarm is set for fall risk patients  - Ensure chair alarm is in place and activated if patient is up in a chair   L  Lines - Check IV for any infiltration  - Truong bag is empty if patient has a Truong   - Tubing and IV bags are labeled   S  Safety   - Room is clean, patient is clean, and equipment is clean. - Hallways are clear from equipment besides carts. - Fall bracelet on for fall risk patients  - Ensure room is clear and free of clutter  - Suction is set up for ALL patients (with presley)  - Hallways are clear from equipment besides carts.    - Isolation precautions followed, supplies available outside room, sign posted

## 2019-07-19 LAB
INR PPP: 2.5 (ref 0.8–1.2)
PROTHROMBIN TIME: 26.7 SEC (ref 11.5–15.2)

## 2019-07-19 PROCEDURE — 85610 PROTHROMBIN TIME: CPT

## 2019-07-19 PROCEDURE — 36415 COLL VENOUS BLD VENIPUNCTURE: CPT

## 2019-07-19 PROCEDURE — 97110 THERAPEUTIC EXERCISES: CPT

## 2019-07-19 PROCEDURE — 74011250637 HC RX REV CODE- 250/637: Performed by: INTERNAL MEDICINE

## 2019-07-19 PROCEDURE — 65310000000 HC RM PRIVATE REHAB

## 2019-07-19 PROCEDURE — 97535 SELF CARE MNGMENT TRAINING: CPT

## 2019-07-19 RX ORDER — WARFARIN 4 MG/1
4 TABLET ORAL ONCE
Status: COMPLETED | OUTPATIENT
Start: 2019-07-19 | End: 2019-07-19

## 2019-07-19 RX ADMIN — METHOCARBAMOL TABLETS 500 MG: 500 TABLET, COATED ORAL at 13:50

## 2019-07-19 RX ADMIN — WARFARIN SODIUM 4 MG: 4 TABLET ORAL at 17:45

## 2019-07-19 RX ADMIN — ACETAMINOPHEN 650 MG: 325 TABLET ORAL at 12:07

## 2019-07-19 RX ADMIN — FERROUS SULFATE TAB 325 MG (65 MG ELEMENTAL FE) 325 MG: 325 (65 FE) TAB at 08:26

## 2019-07-19 RX ADMIN — CALCIUM CITRATE 200 MG (950 MG) TABLET 200 MG: at 17:44

## 2019-07-19 RX ADMIN — Medication 5000 UNITS: at 08:27

## 2019-07-19 RX ADMIN — CALCIUM CITRATE 200 MG (950 MG) TABLET 200 MG: at 08:27

## 2019-07-19 RX ADMIN — Medication 250 MG: at 08:27

## 2019-07-19 RX ADMIN — TAMSULOSIN HYDROCHLORIDE 0.4 MG: 0.4 CAPSULE ORAL at 08:27

## 2019-07-19 RX ADMIN — SIMVASTATIN 10 MG: 10 TABLET, FILM COATED ORAL at 21:39

## 2019-07-19 RX ADMIN — HYDROCODONE BITARTRATE AND ACETAMINOPHEN 1 TABLET: 5; 325 TABLET ORAL at 18:54

## 2019-07-19 RX ADMIN — METHOCARBAMOL TABLETS 500 MG: 500 TABLET, COATED ORAL at 06:52

## 2019-07-19 RX ADMIN — ACETAMINOPHEN 650 MG: 325 TABLET ORAL at 21:39

## 2019-07-19 RX ADMIN — METHOCARBAMOL TABLETS 500 MG: 500 TABLET, COATED ORAL at 21:39

## 2019-07-19 RX ADMIN — HYDROCODONE BITARTRATE AND ACETAMINOPHEN 1 TABLET: 5; 325 TABLET ORAL at 08:28

## 2019-07-19 NOTE — PROGRESS NOTES
OT reports pt c/o dizziness/lightheadness. Pt after OT still c/o dizziness/lightheadness requesting to return to bed. Stated he felt worse than when OT seeing him. Got him in bed with head down/feet up position BP better. And pt reports feeling better. See flow sheet. Returned bed to flat/siightly head elevated and pt reported he has headache but just tired, dizzy, short of breath. BP & heartrate about the same.

## 2019-07-19 NOTE — PROGRESS NOTES
Sw provided orders for DME to [facility]. Sw previously provided pt with information on privately purchasing the bedside commode. Sw loaded home health orders into eDischarge to [facility] per pt's FOC. Sw will follow.

## 2019-07-19 NOTE — PROGRESS NOTES
1 [facility]     Patient _Alessandro Ledbetter______ was evaluated on __7/19/19____ for a manual wheelchair for home use. Patient has a diagnosis(s) of: ___S/P Open reduction internal fixation of displaced subtrochanteric periprosthetic fracture around internal prosthetic left hip joint ____ that causes mobility limitations in the home that significantly impairs the ability to participate in mobility related activities of daily living (MRADLs) like-   Please check all that apply:  __ Toileting __Bathing __ Grooming __ Dressing __ Feeding     Patient has the following mobility condition/limitation that  Please check one:  __ Prevents the beneficiary from accomplishing MRADL entirely  __ Places the beneficiary at reasonably determined heightened risk of morbidity or mortality secondary to the attempts to perform MRADL  __ Prevents the beneficiary from completing an MRADL within a reasonable time frame     The Patients: (Please check all that apply)  __ Mobility limitation cannot be sufficiently resolved by the use of appropriately fitted cane or walker   __ Home provides adequate access between rooms, maneuvering space, and surfaces for use of the manual wheelchair that is provided  __ Use of a manual wheelchair will significantly improve the ability to participate in MRADLs and will be able to use it at home on a regular basis  __ Has not expressed an unwillingness to use the manual wheelchair in the home  __ Has sufficient upper extremity function and other physical and mental capabilities needed to safely self-propel the wheelchair that is provided in the home during a typical day. Limitations of strength, endurance and range of motion, or coordination, presence of pain, or deformity or absence of one or both upper extremities are relevant to the assessment of upper extremity function.    __ Has a caregiver who is available, willing and able to provide assistance with the wheelchair    __6'2\"_____ Height  __228 lbs____ Weight     _______________________________ _[id number]_______  PHYSICIAN'S SIGNATURE   NPI    __7/19/19_____________________________  DATE

## 2019-07-19 NOTE — PROGRESS NOTES
SHIFT CHANGE NOTE FOR [facility]    Bedside and Verbal shift change report given to Peggy Joseph RN (oncoming nurse) by Eliza Suazo RN (offgoing nurse). Report included the following information SBAR, Kardex, MAR and Recent Results. Situation:   Code Status: Full Code   Reason for Admission: S/P ORIF  Hospital Day: 11   Problem List:   Hospital Problems  Date Reviewed: 7/19/2019          Codes Class Noted POA    Increased urinary frequency (Chronic) ICD-10-CM: R35.0  ICD-9-CM: 788.41  7/16/2019 Yes        Vitamin D deficiency (Chronic) ICD-10-CM: E55.9  ICD-9-CM: 268.9  7/10/2019 Yes    Overview Signed 7/10/2019 11:23 AM by Jazmyne Love MD     Vitamin D 25-Hydroxy (7/10/2019) = 18.6              Sepsis due to Pseudomonas species [facility]) ICD-10-CM: A41.52  ICD-9-CM: 038.43, 995.91  7/5/2019 Yes    Overview Addendum 7/6/2019  8:16 PM by Rema Malloy MD     7/5/19      80 y.o. male recently admitted for femur fracturesent from skilled nursing [facility] for fever to 102.9. Patient was recently seen at [facility] for hip replacement status post fall. Patient endorses retrosternal chest pain that started upon waking this morning has been persistent since without propagating or palliating factors. Patient also endorses increased bilateral lower extremity swelling and difficulty breathing. WBC 29.2  P-87%,  U/A neg  CT  Heterogeneous collection posterior to the greater trochanter, most consistent with a postoperative hematoma. No rim enhancement to suggest abscess  CXR Small bilateral pleural effusions, left greater than right. Mild pulmonary edema.  No consolidation  7/6/19 ORTHO ([facility]) Benign appearing left hip and femur now 11 days s/p ORIF periprosthetic hip fracture                         Impaired mobility and ADLs ICD-10-CM: Z74.09  ICD-9-CM: 799.89  7/5/2019 Yes        Acute pulmonary embolism ([facility] ICD-10-CM: I26.99  ICD-9-CM: 415.19  7/1/2019 Yes    Overview Signed 7/9/2019  5:07 PM by Claude Shara Flores MD     Pulmonary emboli at the periphery of the right main pulmonary artery extending into the middle and lower lobe branches             Anticoagulated on warfarin (Chronic) ICD-10-CM: Z79.01  ICD-9-CM: V58.61  7/1/2019 Yes        * (Principal) Status post open reduction with internal fixation of fracture ICD-10-CM: Z96.7, Z87.81  ICD-9-CM: V45.89, V15.51  6/25/2019 Yes    Overview Signed 7/9/2019  4:52 PM by Aundrea Mullins MD     S/P Open reduction internal fixation of displaced subtrochanteric periprosthetic fracture around internal prosthetic left hip joint (6/25/2019 - Dr. Romulo Dutta)             Acute blood loss as cause of postoperative anemia ICD-10-CM: D62  ICD-9-CM: 285.1  6/25/2019 Yes        Periprosthetic fracture around internal prosthetic left hip joint ([facility] ICD-10-CM: M97. 02XA  ICD-9-CM: 996.44, V43.64  6/24/2019 Yes    Overview Signed 7/9/2019  5:04 PM by Aundrea Mullins MD     S/P Open reduction internal fixation of displaced subtrochanteric periprosthetic fracture around internal prosthetic left hip joint (6/25/2019 - Dr. Romulo Dutta)             Displaced subtrochanteric fracture of left femur [facility]) ICD-10-CM: U04.63MO  ICD-9-CM: 820.22  6/24/2019 Yes    Overview Signed 7/9/2019  5:02 PM by Aundrea Mullins MD     S/P Open reduction internal fixation of displaced subtrochanteric periprosthetic fracture around internal prosthetic left hip joint (6/25/2019 - Dr. Romulo Dutta)             History of total left hip arthroplasty ICD-10-CM: P98.131  ICD-9-CM: V43.64  2/18/2019 Yes    Overview Addendum 7/9/2019  5:01 PM by Aundrea Mullins MD     S/P Left total hip replacement (2/18/2019 - Dr. Romulo Dutta)                   Background:   Past Medical History:   Past Medical History:   Diagnosis Date    Acute blood loss as cause of postoperative anemia 6/25/2019    Acute pulmonary embolism ([facility] 7/1/2019    Pulmonary emboli at the periphery of the right main pulmonary artery extending into the middle and lower lobe branches    Anticoagulated on warfarin 7/1/2019    Benign prostatic hyperplasia with lower urinary tract symptoms     Demand ischemia of myocardium ([address] 7/6/2019 4/16/18 NST EF 59% , small, reversible apical defect 7/2/19 ECHO EF 68%, LVH 14/16, LAE, RVE, ANDREY 1.4cm2 with 12mm mean gradient    Diverticulosis     Dyslipidemia     Essential tremor     Gastroesophageal reflux disease     History of pericarditis     Obesity, Class I, BMI 30-34.9     Paroxysmal atrial fibrillation ([address]     CVAL Maddy Saleh FNP 2008  AF ablation 9/11/13 EPS with isolation of RSPV and LIPV 6/4/18 TEDDY  EF 55%, 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 male recently admitted for femur fracturesent from skilled nursing facility for fever to 102.9. Patient was recently seen at [facility] for hip replacement status post fall. Patient endorses retrosternal chest pain that started upon waking this morning has been persistent since without propagating or palliating factors.   Patient also endorses increased bilateral lower extremity s    Sick sinus syndrome ([address]     Vitamin D deficiency 7/10/2019    Vitamin D 25-Hydroxy (7/10/2019) = 18.6       Patient taking anticoagulants yes Coumadin   Patient has a defibrillator: no     Assessment:   Changes in Assessment throughout shift: no     Patient has central line: no Reasons if yes: na  Insertion date:na Last dressing date:na   Patient has Truong Cath: no Reasons if yes: n/a   Insertion date:n/a     Last Vitals:     Vitals:    07/19/19 1135 07/19/19 1420 07/19/19 1559 07/19/19 2138   BP: 121/65 127/69 112/61 141/72   Pulse: 70 77 69 71   Resp:   18 18   Temp:   98 °F (36.7 °C) 98.7 °F (37.1 °C)   SpO2: 98% 92% 96% 98%   Weight:       Height:            PAIN    Pain Assessment    Pain Intensity 1: 4 (07/20/19 0643) Pain Intensity 1: 2 (12/29/14 1105)    Pain Location 1: Hip Pain Location 1: Abdomen    Pain Intervention(s) 1: Medication (see MAR) Pain Intervention(s) 1: Medication (see MAR)  Patient Stated Pain Goal: 0 Patient Stated Pain Goal: 0  o Intervention effective: yes    o Other actions taken for pain: no     Skin Assessment  Skin color Skin Color: Appropriate for ethnicity  Condition/Temperature Skin Condition/Temp: Dry, Warm  Integrity Skin Integrity: Incision (comment)  Turgor Turgor: Non-tenting  Weekly Pressure Ulcer Documentation  Pressure  Injury Documentation: No Pressure Injury Noted-Pressure Ulcer Prevention Initiated  Wound Prevention & Protection Methods  Orientation of wound Orientation of Wound Prevention: Posterior  Location of Prevention Location of Wound Prevention: Sacrum/Coccyx  Dressing Present Dressing Present : No  Dressing Status    Wound Offloading Wound Offloading (Prevention Methods): Bed, pressure redistribution/air, Repositioning     INTAKE/OUPUT  Date 07/19/19 0700 - 07/20/19 0659 07/20/19 0700 - 07/21/19 0659   Shift 3562-3720 8320-3308 24 Hour Total 9083-8382 4979-2557 24 Hour Total   INTAKE   P.O. 360  360        P. O. 360  360      Shift Total(mL/kg) 360(3.5)  360(3.5)      OUTPUT   Urine(mL/kg/hr)  975(0.8) 975(0.4)        Urine Voided  975 975        Urine Occurrence(s) 1 x 4 x 5 x      Stool           Stool Occurrence(s) 1 x 0 x 1 x      Shift Total(mL/kg)  975(9.4) 975(9.4)       -975 -615      Weight (kg) 103.4 103.4 103.4 103.4 103.4 103.4       Recommendations:  1. Patient needs and requests: addressed    2. Diet: cardiac    3. Pending tests/procedures: none     4. Functional Level/Equipment: whellchair    5. Estimated Discharge Date: TBD Posted on Whiteboard in Patients Room: no     [facility] Safety Check    A safety check occurred in the patient's room between off going nurse and oncoming nurse listed above.     The safety check included the below items  Area Items   H  High Alert Medications - Verify all high alert medication drips (heparin, PCA, etc.)   E  Equipment - Suction is set up for ALL patients (with presley)  - Red plugs utilized for all equipment (IV pumps, etc.)  - WOWs wiped down at end of shift.  - Room stocked with oxygen, suction, and other unit-specific supplies   A  Alarms - Bed alarm is set for fall risk patients  - Ensure chair alarm is in place and activated if patient is up in a chair   L  Lines - Check IV for any infiltration  - Truong bag is empty if patient has a Truong   - Tubing and IV bags are labeled   S  Safety   - Room is clean, patient is clean, and equipment is clean. - Hallways are clear from equipment besides carts. - Fall bracelet on for fall risk patients  - Ensure room is clear and free of clutter  - Suction is set up for ALL patients (with presley)  - Hallways are clear from equipment besides carts.    - Isolation precautions followed, supplies available outside room, sign posted

## 2019-07-19 NOTE — PROGRESS NOTES
[x] Psychology  [] Social Work [] Recreational Therapy    INTERVENTION  UNITS/TIME OF SERVICE   Assessment    Supportive Counseling July 17, 2019   Orientation    Discharge Planning    Resource Linkage              Progress/Current Status    Patient seen for support  on ARU on above referenced date and patient's spouse is sitting nearby. She has been and remains steadfast in her support of patient and the fact that she is a retired RN is certainly helpful for him. Furthermore, she seems entirely willing to best understand recommendations for her  by various team members and she is, therefore, not imposing what may be, sometimes, different opinions about how to proceed. Patient himself describes feeling somewhat anxious and stressed about issues related to discharge. They are now planning to be in a motel in [facility] for possibly a month, rather than return to their daughter's residence. They do not want to initiate any structural changes at that location in order to accommodate his restrictions. Patient further described their angst about not having sold their primary residence in [place], and that they would like to relocate to a second property in [place]. However, they feel that they are not able to do so until they sell their home in [place]. These issues, in addition to his primary concerns about his health and welfare and complications in recovery, are all creating stress worry for him. Effort was made to allay some of his immediate, worried thoughts and feelings and help him to refocus his attention on his recovery effort and the good progress that he has made. Patient will continue to be monitored for emotional difficulties while on ARU.        Michelle Lopez, THE [facility] 7/19/2019 8:41 AM

## 2019-07-19 NOTE — PROGRESS NOTES
conducted a Follow up consultation and Spiritual Assessment for Killian Heredia, who is a 80 y.o.,male. The  provided the following Interventions:  Continued the relationship of care and support. Listened empathically as the patient share the reason for and his progress with his hospitalization. Offered prayer and assurance of continued prayer on patients behalf. The following outcomes were achieved:  Patient expressed gratitude for 's visit. Assessment:  There are no further spiritual or Hinduism issues which require Spiritual Care Services interventions at this time. Plan:  Chaplains will continue to follow and will provide pastoral care on an as needed/requested basis.  recommends bedside caregivers page  on duty if patient shows signs of acute spiritual or emotional distress.        [address] [place] [address]   [phone]

## 2019-07-19 NOTE — PROGRESS NOTES
SHIFT CHANGE NOTE FOR [facility]    Bedside and Verbal shift change report given to Carmelina Hernandez RN (oncoming nurse) by Jorge Alberto Bocanegra RN (offgoing nurse). Report included the following information SBAR, Kardex, MAR and Recent Results. Situation:   Code Status: Full Code   Reason for Admission: S/P ORIF  Hospital Day: 10   Problem List:   Hospital Problems  Date Reviewed: 7/19/2019          Codes Class Noted POA    Increased urinary frequency (Chronic) ICD-10-CM: R35.0  ICD-9-CM: 788.41  7/16/2019 Yes        Vitamin D deficiency (Chronic) ICD-10-CM: E55.9  ICD-9-CM: 268.9  7/10/2019 Yes    Overview Signed 7/10/2019 11:23 AM by Sylvia Fisher MD     Vitamin D 25-Hydroxy (7/10/2019) = 18.6              Sepsis due to Pseudomonas species [facility]) ICD-10-CM: A41.52  ICD-9-CM: 038.43, 995.91  7/5/2019 Yes    Overview Addendum 7/6/2019  8:16 PM by Tracie Kc MD     7/5/19      80 y.o. male recently admitted for femur fracturesent from skilled nursing [facility] for fever to 102.9. Patient was recently seen at [facility] for hip replacement status post fall. Patient endorses retrosternal chest pain that started upon waking this morning has been persistent since without propagating or palliating factors. Patient also endorses increased bilateral lower extremity swelling and difficulty breathing. WBC 29.2  P-87%,  U/A neg  CT  Heterogeneous collection posterior to the greater trochanter, most consistent with a postoperative hematoma. No rim enhancement to suggest abscess  CXR Small bilateral pleural effusions, left greater than right. Mild pulmonary edema.  No consolidation  7/6/19 ORTHO (Duane Nakai) Benign appearing left hip and femur now 11 days s/p ORIF periprosthetic hip fracture                         Impaired mobility and ADLs ICD-10-CM: Z74.09  ICD-9-CM: 799.89  7/5/2019 Yes        Acute pulmonary embolism ([facility] ICD-10-CM: I26.99  ICD-9-CM: 415.19  7/1/2019 Yes    Overview Signed 7/9/2019  5:07 PM by Mary Nazario Luis Miguel Olivier MD     Pulmonary emboli at the periphery of the right main pulmonary artery extending into the middle and lower lobe branches             Anticoagulated on warfarin (Chronic) ICD-10-CM: Z79.01  ICD-9-CM: V58.61  7/1/2019 Yes        * (Principal) Status post open reduction with internal fixation of fracture ICD-10-CM: Z96.7, Z87.81  ICD-9-CM: V45.89, V15.51  6/25/2019 Yes    Overview Signed 7/9/2019  4:52 PM by Angela Strauss MD     S/P Open reduction internal fixation of displaced subtrochanteric periprosthetic fracture around internal prosthetic left hip joint (6/25/2019 - Dr. Rashid Carlton)             Acute blood loss as cause of postoperative anemia ICD-10-CM: D62  ICD-9-CM: 285.1  6/25/2019 Yes        Periprosthetic fracture around internal prosthetic left hip joint ([facility] ICD-10-CM: M97. 02XA  ICD-9-CM: 996.44, V43.64  6/24/2019 Yes    Overview Signed 7/9/2019  5:04 PM by Angela Strauss MD     S/P Open reduction internal fixation of displaced subtrochanteric periprosthetic fracture around internal prosthetic left hip joint (6/25/2019 - Dr. Rashid Carlton)             Displaced subtrochanteric fracture of left femur [facility]) ICD-10-CM: K96.99IK  ICD-9-CM: 820.22  6/24/2019 Yes    Overview Signed 7/9/2019  5:02 PM by Angela Strauss MD     S/P Open reduction internal fixation of displaced subtrochanteric periprosthetic fracture around internal prosthetic left hip joint (6/25/2019 - Dr. Rashid Carlton)             History of total left hip arthroplasty ICD-10-CM: O26.807  ICD-9-CM: V43.64  2/18/2019 Yes    Overview Addendum 7/9/2019  5:01 PM by Angela Strauss MD     S/P Left total hip replacement (2/18/2019 - Dr. Rashid Carlton)                   Background:   Past Medical History:   Past Medical History:   Diagnosis Date    Acute blood loss as cause of postoperative anemia 6/25/2019    Acute pulmonary embolism ([facility] 7/1/2019    Pulmonary emboli at the periphery of the right main pulmonary artery extending into the middle and lower lobe branches    Anticoagulated on warfarin 7/1/2019    Benign prostatic hyperplasia with lower urinary tract symptoms     Demand ischemia of myocardium ([facility] 7/6/2019 4/16/18 NST EF 59% , small, reversible apical defect 7/2/19 ECHO EF 68%, LVH 14/16, LAE, RVE, ANDREY 1.4cm2 with 12mm mean gradient    Diverticulosis     Dyslipidemia     Essential tremor     Gastroesophageal reflux disease     History of pericarditis     Obesity, Class I, BMI 30-34.9     Paroxysmal atrial fibrillation (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 7/5/2019 7/5/19   80 y.o. male recently admitted for femur fracturesent from skilled nursing facility for fever to 102.9. Patient was recently seen at [facility] for hip replacement status post fall. Patient endorses retrosternal chest pain that started upon waking this morning has been persistent since without propagating or palliating factors.   Patient also endorses increased bilateral lower extremity s    Sick sinus syndrome ([facility]     Vitamin D deficiency 7/10/2019    Vitamin D 25-Hydroxy (7/10/2019) = 18.6       Patient taking anticoagulants yes Coumadin   Patient has a defibrillator: no     Assessment:   Changes in Assessment throughout shift: no     Patient has central line: no Reasons if yes: na  Insertion date:na Last dressing date:na   Patient has Truong Cath: no Reasons if yes: n/a   Insertion date:n/a     Last Vitals:     Vitals:    07/19/19 1121 07/19/19 1135 07/19/19 1420 07/19/19 1559   BP: 131/69 121/65 127/69 112/61   Pulse: 76 70 77 69   Resp:    18   Temp:    98 °F (36.7 °C)   SpO2:  98% 92% 96%   Weight:       Height:            PAIN    Pain Assessment    Pain Intensity 1: 6 (07/19/19 1855) Pain Intensity 1: 2 (12/29/14 1105)    Pain Location 1: Hip, Leg Pain Location 1: Abdomen    Pain Intervention(s) 1: Medication (see MAR) Pain Intervention(s) 1: Medication (see MAR)  Patient Stated Pain Goal: 0 Patient Stated Pain Goal: 0  o Intervention effective: yes    o Other actions taken for pain: no     Skin Assessment  Skin color Skin Color: Appropriate for ethnicity  Condition/Temperature Skin Condition/Temp: Warm, Dry  Integrity Skin Integrity: Incision (comment)  Turgor Turgor: Non-tenting  Weekly Pressure Ulcer Documentation  Pressure  Injury Documentation: No Pressure Injury Noted-Pressure Ulcer Prevention Initiated  Wound Prevention & Protection Methods  Orientation of wound Orientation of Wound Prevention: Posterior  Location of Prevention Location of Wound Prevention: Buttocks, Sacrum/Coccyx  Dressing Present Dressing Present : No  Dressing Status    Wound Offloading Wound Offloading (Prevention Methods): Adaptive equipment, Bed, pressure reduction mattress, Chair cushion, Elevate heels, Pillows, Repositioning, Turning, Wheelchair, Walker     INTAKE/OUPUT  Date 07/18/19 1900 - 07/19/19 0659 07/19/19 0700 - 07/20/19 0659   Shift 8744-4665 24 Hour Total 8520-6235 8882-9414 24 Hour Total   INTAKE   P.O. 300 1180 360  360     P. O. 300 1180 360  360   Shift Total(mL/kg) 300(2.9) 1180(11.4) 360(3.5)  360(3.5)   OUTPUT   Urine(mL/kg/hr)          Urine Occurrence(s) 3 x 4 x 1 x  1 x   Stool          Stool Occurrence(s) 1 x 2 x 1 x  1 x   Shift Total(mL/kg)         1180 360  360   Weight (kg) 103.4 103.4 103.4 103.4 103.4       Recommendations:  1. Patient needs and requests: addressed    2. Diet: cardiac    3. Pending tests/procedures: none     4. Functional Level/Equipment: whellchair    5.  Estimated Discharge Date: TBD Posted on Whiteboard in Patients Room: no     HEALS Safety Check    A safety check occurred in the patient's room between off going nurse and oncoming nurse listed above. The safety check included the below items  Area Items   H  High Alert Medications - Verify all high alert medication drips (heparin, PCA, etc.)   E  Equipment - Suction is set up for ALL patients (with presley)  - Red plugs utilized for all equipment (IV pumps, etc.)  - WOWs wiped down at end of shift.  - Room stocked with oxygen, suction, and other unit-specific supplies   A  Alarms - Bed alarm is set for fall risk patients  - Ensure chair alarm is in place and activated if patient is up in a chair   L  Lines - Check IV for any infiltration  - Truong bag is empty if patient has a Truong   - Tubing and IV bags are labeled   S  Safety   - Room is clean, patient is clean, and equipment is clean. - Hallways are clear from equipment besides carts. - Fall bracelet on for fall risk patients  - Ensure room is clear and free of clutter  - Suction is set up for ALL patients (with presley)  - Hallways are clear from equipment besides carts.    - Isolation precautions followed, supplies available outside room, sign posted

## 2019-07-19 NOTE — PROGRESS NOTES
[facility] PHYSICAL REHABILITATION  [address] [place] [address], [address]     INPATIENT REHABILITATION  DAILY PROGRESS NOTE     Date: 7/19/2019    Name: Fausot Mcelroy. Age / Sex: 80 y.o. / male   CSN: [id number] MRN: [id number]   Admit Date: 7/9/2019 Length of Stay: 10 days     Primary Rehab Diagnosis: Impaired Mobility and ADLs secondary to:  1. S/P Open reduction internal fixation of displaced subtrochanteric periprosthetic fracture around internal prosthetic left hip joint (6/25/2019 - Dr. Mike Diallo)  2. History of displaced subtrochanteric periprosthetic fracture of left femur around internal prosthetic left hip joint; S/P Left total hip replacement (2/18/2019 - Dr. Mike Diallo)      Subjective:     Patient seen and examined. Blood pressure controlled. Patient's Complaint:   No significant medical complaints     Pain Control: ongoing significant pain in which is stable and controlled by current meds      Objective:     Vital Signs:  Patient Vitals for the past 24 hrs:   BP Temp Pulse Resp SpO2   07/19/19 1135 121/65  70  98 %   07/19/19 1121 131/69  76     07/19/19 1114 133/66 97.8 °F (36.6 °C) 81 14 98 %   07/19/19 1034 116/73  82  97 %   07/19/19 1024 105/68  96  96 %   07/19/19 1019 101/75  (!) 101  97 %   07/19/19 0742 124/68 97.9 °F (36.6 °C) 69 18 96 %   07/18/19 2149 132/68 98.9 °F (37.2 °C) 73 20 97 %   07/18/19 1515 112/64 97.2 °F (36.2 °C) 81 18 97 %        Physical Examination:  GENERAL SURVEY: Patient is awake, alert, oriented x 3, sitting comfortably on the chair, not in acute respiratory distress.   HEENT: pale palpebral conjunctivae, anicteric sclerae, no nasoaural discharge, moist oral mucosa  NECK: supple, no jugular venous distention, no palpable lymph nodes  CHEST/LUNGS: symmetrical chest expansion, good air entry, clear breath sounds  HEART: adynamic precordium, good S1 S2, no S3, regular rhythm, no murmurs  ABDOMEN: obese, bowel sounds appreciated, soft, non-tender  EXTREMITIES: pale nailbeds, no edema, full and equal pulses, no calf tenderness   NEUROLOGICAL EXAM: The patient is awake, alert and oriented x3, able to answer questions fairly appropriately, able to follow 1 and 2 step commands. Able to tell time from the wall clock. Cranial nerves II-XII are grossly intact. No gross sensory deficit. Motor strength is 4+/5 on BUE, 4 to 4+/5 on the RLE, 2-/5 on the LLE (except 3- on the left ankle).      Incision(s): healing well, clean, dry, and intact      Current Medications:  Current Facility-Administered Medications   Medication Dose Route Frequency    diphenhydrAMINE (BENADRYL) capsule 25 mg  25 mg Oral QHS PRN    tamsulosin (FLOMAX) capsule 0.4 mg  0.4 mg Oral DAILY    diclofenac (FLECTOR) 1.3 % transdermal patch 1 Patch  1 Patch TransDERmal Q12H    cholecalciferol (VITAMIN D3) capsule 5,000 Units  5,000 Units Oral DAILY    calcium citrate tablet 200 mg [elemental]  200 mg Oral BID    warfarin - physician to dose   Other Q24H    ferrous sulfate tablet 325 mg  1 Tab Oral DAILY WITH BREAKFAST    ascorbic acid (vitamin C) (VITAMIN C) tablet 250 mg  250 mg Oral DAILY WITH BREAKFAST    methocarbamol (ROBAXIN) tablet 500 mg  500 mg Oral Q8H    acetaminophen (TYLENOL) tablet 650 mg  650 mg Oral Q4H PRN    bisacodyl (DULCOLAX) tablet 10 mg  10 mg Oral Q48H PRN    HYDROcodone-acetaminophen (NORCO) 5-325 mg per tablet 1 Tab  1 Tab Oral Q4H PRN    simvastatin (ZOCOR) tablet 10 mg  10 mg Oral QHS       Allergies:  No Known Allergies      Lab/Data Review:  Recent Results (from the past 24 hour(s))   PROTHROMBIN TIME + INR    Collection Time: 07/19/19  6:23 AM   Result Value Ref Range    Prothrombin time 26.7 (H) 11.5 - 15.2 sec    INR 2.5 (H) 0.8 - 1.2         Estimated Glomerular Filtration Rate:  On admission, estimated GFR based on a Creatinine of 0.89 mg/dl:              Using CKD-EPI = 78.5 mL/min/1.73m2              Using MDRD = 86.6 mL/min/1.73m2      Assessment:     Primary Rehabilitation Diagnosis  1. Impaired Mobility and ADLs  2. S/P Open reduction internal fixation of displaced subtrochanteric periprosthetic fracture around internal prosthetic left hip joint (6/25/2019 - Dr. Dasha Kelly)  3. History of displaced subtrochanteric periprosthetic fracture of left femur around internal prosthetic left hip joint; S/P Left total hip replacement (2/18/2019 - Dr. 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    Mild aortic stenosis I35.0    Acute blood loss as cause of postoperative anemia D62    Acute pulmonary embolism  I26.99    Anticoagulated on warfarin Z79.01    Primary osteoarthritis involving multiple joints M15.0    Gastroesophageal reflux disease K21.9    Dyslipidemia E78.5    Diverticulosis K57.90    History of deep vein thrombosis of lower extremity Z86.718    Sick sinus syndrome  I49.5    Benign prostatic hyperplasia with lower urinary tract symptoms N40.1    Tremor R25.1    Obesity, Class I, BMI 30-34.9 E66.9    Vitamin D deficiency E55.9    Essential tremor G25.0    History of pericarditis Z86.79        Plan:     1. Justification for continued stay: Good progression towards established rehabilitation goals. 2. Medical Issues being followed closely:    [x]  Fall and safety precautions     [x]  Wound Care     [x]  Bowel and Bladder Function     [x]  Fluid Electrolyte and Nutrition Balance     [x]  Pain Control      3.  Issues that 24 hour rehabilitation nursing is following:    [x]  Fall and safety precautions     [x]  Wound Care     [x]  Bowel and Bladder Function     [x]  Fluid Electrolyte and Nutrition Balance     [x]  Pain Control      [x]  Assistance with and education on in-room safety with transfers to and from the bed, wheelchair, toilet and shower. 4. Acute rehabilitation plan of care:    [x]  Continue current care and rehab. [x]  Physical Therapy           [x]  Occupational Therapy           []  Speech Therapy     []  Hold Rehab until further notice     5. Medications:    [x]  MAR Reviewed     [x]  Continue Present Medications     6. DVT Prophylaxis:      []  Lovenox     []  Unfractionated Heparin     [x]  Coumadin     []  NOAC     []  WINDY Stockings     []  Sequential Compression Device     []  None     7. Orders:   > S/P Open reduction internal fixation of displaced subtrochanteric periprosthetic fracture around internal prosthetic left hip joint (6/25/2019 - Dr. Abhay Cardona);  History of displaced subtrochanteric periprosthetic fracture of left femur around internal prosthetic left hip joint; S/P Left total hip replacement (2/18/2019 - Dr. Abhay Cardona)              > Nonweightbearing on the left lower extremity              > Total hip precautions on left hip              > Staples were removed on 7/9/2019               > On 7/10/2019, started Calcium citrate 1 tab PO BID   > Continue Calcium citrate 1 tab PO BID     > Acute Postoperative Blood Loss Anemia              > Hgb/Hct (7/10/2019, on admission) = 8.8/28.0              > Anemia work-up showed serum iron 40, TIBC 237, iron % saturation 17, ferritin 568, reticulocyte count 5.0   > On 7/10/2019, started FeSO4 and Ascorbic acid   > Hgb/Hct (7/15/2019) = 9.3/29.5    > Hgb/Hct (7/18/2019) = 9.3/29.6    > Continue:               > FeSO4 325 mg PO once daily with breakfast                > Ascorbic Acid 250 mg PO once daily with breakfast (to enhance the absorption of the FeSO4)      > Acute pulmonary embolism, anticoagulated on Warfarin              > Target INR = 2 to 3              > INR (7/19/2019) = 2.5              > Patient received Warfarin 3 mg PO last night               > Warfarin 4 mg PO tonight                > Sepsis due to Pseudomonas aeruginosa              > Blood culture (7/5/2019; 1 out of 2 sets) yielded growth of PSeudomonas aeruginosa (PANSENSITIVE)              > On 7/5/2019, patient was empirically started on Piperacillin-Tazobactam 4.5 grams IV q 6 hr and a single dose of Vancomycin IV was given              > On 7/9/2019, Piperacillin-Tazobactam IV was discontinued and patient was discharged from [facility] on oral Ciprofloxacin 500 mg PO q 12 hr x 4 days              > On 7/13/2019, patient had completed the recommended 4-day treatment course of Ciprofloxacin 500 mg PO q 12 hr      > Vitamin D Deficiency              > Vitamin D 25-Hydroxy (7/10/2019) = 18.6              > On 7/10/2019, patient was given Cholecalciferol 50,000 units PO x 1 dose               > On 7/11/2019, started Cholecalciferol 5,000 units PO once daily   > Continue Cholecalciferol 5,000 units PO once daily     > Increased urinary frequency   > Patient stated he has had increased urinary frequency even prior to admission to the acute care hospital; patient inquired of options of treatment for increased frequency / possible prostate issues; patient stated he tried Tamsulosin in the past with no reported adverse reactions   > On 7/17/2019, started a trial of Tamsulosin 0.4 mg PO once daily   > Discontinue Tamsulosin 0.4 mg PO once daily (re: per request of patient - possible cause of postural hypotension noted this AM with therapy)    > Difficulty sleeping   > On 7/17/2019, added Diphenhydramine 25 mg PO q HS PRN for sleep   > Discontinue Diphenhydramine 25 mg PO q HS PRN for sleep (re: per request of patient)    > Analgesia   > On 7/15/2019, added Diclofenac 1.3% transdermal patch, 1 patch on skin of left thigh q 12 hr   > Continue:               > Acetaminophen 650 mg PO q 4 hr PRN for pain level less than 5/10    > Diclofenac 1.3% transdermal patch, 1 patch on skin of left thigh q 12 hr               > Methocarbamol 500 mg PO q 8 hr                > Norco 5/325 1 tab PO q 4 hr PRN for pain level greater than 4/10      > Diet:              > Specifications: Low saturated fat              > Solids (consistency): Regular               > Liquids (consistency): Thin       8. Patient's progress in rehabilitation and medical issues discussed with the patient. All questions answered to the best of my ability. Care plan discussed with patient and nurse.       Signed:    Montrell Rivas MD    July 19, 2019

## 2019-07-19 NOTE — PROGRESS NOTES
SHIFT CHANGE NOTE FOR [facility]    Bedside and Verbal shift change report given to Germaine Farr RN (oncoming nurse) by Anne-Marie Chambers RN (offgoing nurse). Report included the following information SBAR, Kardex, MAR and Recent Results. Situation:   Code Status: Full Code   Reason for Admission: S/P ORIF  Hospital Day: 10   Problem List:   Hospital Problems  Date Reviewed: 7/18/2019          Codes Class Noted POA    Increased urinary frequency (Chronic) ICD-10-CM: R35.0  ICD-9-CM: 788.41  7/16/2019 Yes        Vitamin D deficiency (Chronic) ICD-10-CM: E55.9  ICD-9-CM: 268.9  7/10/2019 Yes    Overview Signed 7/10/2019 11:23 AM by Shalonda Ames MD     Vitamin D 25-Hydroxy (7/10/2019) = 18.6              Sepsis due to Pseudomonas species [facility]) ICD-10-CM: A41.52  ICD-9-CM: 038.43, 995.91  7/5/2019 Yes    Overview Addendum 7/6/2019  8:16 PM by Adan Quinn MD     7/5/19      80 y.o. male recently admitted for femur fracturesent from skilled nursing [facility] for fever to 102.9. Patient was recently seen at [facility] for hip replacement status post fall. Patient endorses retrosternal chest pain that started upon waking this morning has been persistent since without propagating or palliating factors. Patient also endorses increased bilateral lower extremity swelling and difficulty breathing. WBC 29.2  P-87%,  U/A neg  CT  Heterogeneous collection posterior to the greater trochanter, most consistent with a postoperative hematoma. No rim enhancement to suggest abscess  CXR Small bilateral pleural effusions, left greater than right. Mild pulmonary edema.  No consolidation  7/6/19 [facility] [place] [facility]) Benign appearing left hip and femur now 11 days s/p ORIF periprosthetic hip fracture                         Impaired mobility and ADLs ICD-10-CM: Z74.09  ICD-9-CM: 799.89  7/5/2019 Yes        Acute pulmonary embolism ([facility] [address] ICD-10-CM: I26.99  ICD-9-CM: 415.19  7/1/2019 Yes    Overview Signed 7/9/2019  5:07 PM by Shalonda Ames MD     Pulmonary emboli at the periphery of the right main pulmonary artery extending into the middle and lower lobe branches             Anticoagulated on warfarin (Chronic) ICD-10-CM: Z79.01  ICD-9-CM: V58.61  7/1/2019 Yes        * (Principal) Status post open reduction with internal fixation of fracture ICD-10-CM: Z96.7, Z87.81  ICD-9-CM: V45.89, V15.51  6/25/2019 Yes    Overview Signed 7/9/2019  4:52 PM by Juanita Motley MD     S/P Open reduction internal fixation of displaced subtrochanteric periprosthetic fracture around internal prosthetic left hip joint (6/25/2019 - Dr. Mike Diallo)             Acute blood loss as cause of postoperative anemia ICD-10-CM: D62  ICD-9-CM: 285.1  6/25/2019 Yes        Periprosthetic fracture around internal prosthetic left hip joint ([facility] [address] ICD-10-CM: M97. 02XA  ICD-9-CM: 996.44, V43.64  6/24/2019 Yes    Overview Signed 7/9/2019  5:04 PM by Juanita Motley MD     S/P Open reduction internal fixation of displaced subtrochanteric periprosthetic fracture around internal prosthetic left hip joint (6/25/2019 - Dr. Mike Diallo)             Displaced subtrochanteric fracture of left femur [facility]) ICD-10-CM: U72.84PJ  ICD-9-CM: 820.22  6/24/2019 Yes    Overview Signed 7/9/2019  5:02 PM by Juanita Motley MD     S/P Open reduction internal fixation of displaced subtrochanteric periprosthetic fracture around internal prosthetic left hip joint (6/25/2019 - Dr. Mike Diallo)             History of total left hip arthroplasty ICD-10-CM: P00.772  ICD-9-CM: V43.64  2/18/2019 Yes    Overview Addendum 7/9/2019  5:01 PM by Juanita Motley MD     S/P Left total hip replacement (2/18/2019 - Dr. Mike Diallo)                   Background:   Past Medical History:   Past Medical History:   Diagnosis Date    Acute blood loss as cause of postoperative anemia 6/25/2019    Acute pulmonary embolism ([address] 7/1/2019    Pulmonary emboli at the periphery of the right main pulmonary artery extending into the middle and lower lobe branches    Anticoagulated on warfarin 7/1/2019    Benign prostatic hyperplasia with lower urinary tract symptoms     Demand ischemia of myocardium ([address] 7/6/2019 4/16/18 NST EF 59% , small, reversible apical defect 7/2/19 ECHO EF 68%, LVH 14/16, LAE, RVE, ANDREY 1.4cm2 with 12mm mean gradient    Diverticulosis     Dyslipidemia     Essential tremor     Gastroesophageal reflux disease     History of pericarditis     Obesity, Class I, BMI 30-34.9     Paroxysmal atrial fibrillation ([address]     CVAL Maddy Saleh FNP 2008  AF ablation 9/11/13 EPS with isolation of RSPV and LIPV 6/4/18 TEDDY  EF 55%, 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 male recently admitted for femur fracturesent from skilled nursing facility for fever to 102.9. Patient was recently seen at [facility] for hip replacement status post fall. Patient endorses retrosternal chest pain that started upon waking this morning has been persistent since without propagating or palliating factors.   Patient also endorses increased bilateral lower extremity s    Sick sinus syndrome ([address]     Vitamin D deficiency 7/10/2019    Vitamin D 25-Hydroxy (7/10/2019) = 18.6       Patient taking anticoagulants yes Coumadin   Patient has a defibrillator: no     Assessment:   Changes in Assessment throughout shift: no     Patient has central line: no Reasons if yes: na  Insertion date:na Last dressing date:na   Patient has Truong Cath: no Reasons if yes: n/a   Insertion date:n/a     Last Vitals:     Vitals:    07/18/19 0735 07/18/19 1045 07/18/19 1515 07/18/19 2149   BP: 125/63 108/67 112/64 132/68   Pulse: 63 96 81 73   Resp: 16  18 20   Temp: 97.7 °F (36.5 °C)  97.2 °F (36.2 °C) 98.9 °F (37.2 °C)   SpO2: 95% 97% 97% 97%   Weight:       Height:  PAIN    Pain Assessment    Pain Intensity 1: 0 (07/19/19 0400) Pain Intensity 1: 2 (12/29/14 1105)    Pain Location 1: Leg, Hip Pain Location 1: Abdomen    Pain Intervention(s) 1: Medication (see MAR) Pain Intervention(s) 1: Medication (see MAR)  Patient Stated Pain Goal: 0 Patient Stated Pain Goal: 0  o Intervention effective: yes    o Other actions taken for pain: no     Skin Assessment  Skin color Skin Color: Appropriate for ethnicity  Condition/Temperature Skin Condition/Temp: Dry, Warm  Integrity Skin Integrity: Incision (comment)  Turgor Turgor: Non-tenting  Weekly Pressure Ulcer Documentation  Pressure  Injury Documentation: No Pressure Injury Noted-Pressure Ulcer Prevention Initiated  Wound Prevention & Protection Methods  Orientation of wound Orientation of Wound Prevention: Posterior  Location of Prevention Location of Wound Prevention: Buttocks  Dressing Present Dressing Present : No  Dressing Status    Wound Offloading Wound Offloading (Prevention Methods): Adaptive equipment, Bed, pressure reduction mattress, Chair cushion, Pillows, Repositioning, Turning, [place], Wheelchair     INTAKE/OUPUT  Date 07/18/19 0700 - 07/19/19 0659 07/19/19 0700 - 07/20/19 0659   Shift 8128-3903 3147-8067 24 Hour Total 2429-6691 9822-4976 24 Hour Total   INTAKE   P.O.         P. O.       Shift Total(mL/kg) 880(8.5) 300(2.9) 1180(11.4)      OUTPUT   Urine(mL/kg/hr)           Urine Occurrence(s) 1 x 1 x 2 x      Stool           Stool Occurrence(s) 1 x 1 x 2 x      Shift Total(mL/kg)          516 7473      Weight (kg) 103.4 103.4 103.4 103.4 103.4 103.4       Recommendations:  1. Patient needs and requests: addressed    2. Diet: cardiac    3. Pending tests/procedures: none     4. Functional Level/Equipment: whellchair    5.  Estimated Discharge Date: TBD Posted on Whiteboard in Patients Room: no     HEALS Safety Check    A safety check occurred in the patient's room between off going nurse and oncoming nurse listed above. The safety check included the below items  Area Items   H  High Alert Medications - Verify all high alert medication drips (heparin, PCA, etc.)   E  Equipment - Suction is set up for ALL patients (with presley)  - Red plugs utilized for all equipment (IV pumps, etc.)  - WOWs wiped down at end of shift.  - Room stocked with oxygen, suction, and other unit-specific supplies   A  Alarms - Bed alarm is set for fall risk patients  - Ensure chair alarm is in place and activated if patient is up in a chair   L  Lines - Check IV for any infiltration  - Truong bag is empty if patient has a Truong   - Tubing and IV bags are labeled   S  Safety   - Room is clean, patient is clean, and equipment is clean. - Hallways are clear from equipment besides carts. - Fall bracelet on for fall risk patients  - Ensure room is clear and free of clutter  - Suction is set up for ALL patients (with presley)  - Hallways are clear from equipment besides carts.    - Isolation precautions followed, supplies available outside room, sign posted

## 2019-07-19 NOTE — PROGRESS NOTES
PT Attempt Note    11:35    Attempted to see pt for scheduled PT intervention at 1130. Nurse indicated she wished to speak to PT prior to attempt. Per OT report and per nursing report, pt had appeared to become hypotensive in standing at end of OT treatment. Pt's nurse reports pt's vital signs are now WNL in supine but that she is still concerned as pt had c/o shortness of breathe and she would like to speak with the unit MD re: pt's complaints. PT re-assessed pt's vital signs as pt laid in supine and pt's BP was 121/65 mmHg with a heart rate of 70 BPM and his SpO2 was 98% on room air with pt reporting he no longer felt short of breath. However, pt notes he does not feel well enough to participate in physical therapy intervention stating, \"I don't want to do anything but lie here. \"  Pt is pleasant and reports, \"I know I'm shooting myself in the foot not coming to therapy,\" but acknowledges he does not feel well enough to do so. At this time, pt's nurse arrived in room to report to pt she would continue to monitor his symptoms and that the unit MD was recommending the pt drink more fluids. Pt missed scheduled PT intervention due to feeling unwell post hypotensive episode. Will re-attempt as able.     Ki Lin PT, DPT

## 2019-07-19 NOTE — PROGRESS NOTES
Problem: Self Care Deficits Care Plan (Adult)  Goal: *Therapy Goal (Edit Goal, Insert Text)  Description  Occupational Therapy Goals   Short Term Goals= Long Term Goals  Initiated 7/10/19 and to be accomplished within 2 week(s) (To be accomplished by discharge 2019)  1. Pt will perform self-feeding with I. ()  2. Pt will perform grooming with S. (, upgrade to MI)  3. Pt will perform UB bathing with S. (, upgrade to MI)  4. Pt will perform LB bathing with S.  5. Pt will perform tub/shower transfer with S.   6. Pt will perform UB dressing with S. (, upgrade to MI)  7. Pt will perform LB dressing with S.  8. Pt will perform toileting task with [facility]. (, upgrade to [facility])  9. Pt will perform toilet transfer with S.    Short Term Goals   Initiated 7/10/19 and to be accomplished within 7 day(s)  (Updated 2019)  1. Pt will perform self-feeding with S. ()  2. Pt will perform grooming with S. ()  3. Pt will perform UB bathing with S. ()  4. Pt will perform LB bathing with [place]. ()  5. Pt will perform tub/shower transfer with [facility]. ()  6. Pt will perform UB dressing with S. ()  7. Pt will perform LB dressing with [place]. ()  8. Pt will perform toileting task with [place]. ()  9. Pt will perform toilet transfer with [facility]. ()       Outcome: Progressing Towards Goal   OCCUPATIONAL THERAPY TREATMENT    Patient: Fausto Mcelroy.   80 y.o. Patient identified with name and : yes    Date: 2019    First Tx Session  Time In: 0930  Time Out[de-identified] 5759    Second Tx Session  Time In: 1400  Time Out[de-identified] 3478    Diagnosis: Status post open reduction with internal fixation of fracture [Z96.7, Z87.81]   Precautions:  NWB L LE Total hip precautions L LE   Chart, occupational therapy assessment, plan of care, and goals were reviewed. Pain:  Pt reports 4/10 pain or discomfort prior to treatment. Pt reports 4/10 pain or discomfort post treatment.    Intervention Provided: Pt provided with pain patch for L LE which pt reporting eases pain significantly. SUBJECTIVE:   1st session: Pt demo'ing fatigued demeanor while toileting after shower. Pt impulsively performed stand>sit, requiring encouragement to disclose reasoning for sitting w/ pt stating \"I felt like I was going to put weight through my leg, and needed to sit. \" Pt's vitals assessed 3x (see below) w/ pt demo'ing low blood pressure and increased heart rate after standing ~30-45 seconds. Nursing notified t/o. Pt encouraged to drink fluids while seated on commode prior to transferring back to w/c. Blood pressure raised while pt seated in w/c, session ended early to allow pt to rest. Will attempt to see pt later in afternoon. 1st session: Pt willing to perform tasks as I'ly as possible during ARU stay, but requesting while wife is present to allow her to assist (pt and wife have been  60 years and have relationship of assisting each other). Pt has demo'ed ability to perform tasks with SBA-CGA (set-up for LB dressing, toileting, etc) but during spouse training session today, scores reflecting wife's assistance. OBJECTIVE DATA SUMMARY:   Pt approached for 1st tx seated in w/c with spouse present for ongoing family training w/ spouse to perform shower ADL for patient w/ OT supervision. Pt ended session seated in w/c with nursing notified and spouse present. Patient Vitals for the past 4 hrs:   Temp Pulse Resp BP SpO2   07/19/19 1034  82  116/73 97 %   07/19/19 1024  96  105/68 96 %   07/19/19 1019  (!) 101  101/75 97 %     Pt approached for 2nd tx supine in bed with nursing and spouse present, pt requesting not to do any OOB activity, but agreeable to therex seated EOB. Pt performed supine>sit EOB with use of leg  (with min encouragement) with SBA w/ tactile cue. Pt sat EOB with Supervision with cues for good trunk extension and engagement of core muscles to incr good seated posture.  Pt performed EOB>recliner transfer with RW with CGA. Pt's vitals assessed seated in recliner at end of tx (see below). Patient Vitals for the past 4 hrs:   Temp Pulse Resp BP SpO2   07/19/19 1420  77  127/69 92 %       THERAPEUTIC EXERCISE Daily Assessment   Pt performed therex to facilitate increased postural support strength and engagement necessary for good standing posture w/ use of RW for functional transfers/mobility. 2nd session: Seated EOB, pt performed 3 x 10 shldr flexion to 90 degrees abd/adduction, 3 x 10 elbow flexed at sides to 90 degrees abd/adduction. GROOMING Daily Assessment    Grooming  Grooming Assistance : 6 (Modified independent)  Comments: Washing face seated on tub bench      UPPER BODY BATHING Daily Assessment    Upper Body Bathing  Bathing Assistance, Upper: 6 (Modified independent)  Position Performed: Seated in chair; Other (comment)(TTB)  Adaptive Equipment: Long handled sponge     LOWER BODY BATHING Daily Assessment    Lower Body Bathing  Bathing Assistance, Lower : 5 (Stand-by assistance)  Adaptive Equipment: Grab bar;Long handled sponge;Walker  Position Performed: Seated in chair;Standing  Adaptive Equipment: Tub bench  Comments: Pt performed buttocks washing standing w/ RW in shower     TOILETING Daily Assessment     Pt performed sit on 3:1>stand with RW and wife cleansed pt's perineal region after BM . Pt performed impulsive stand>sit (see 's' for more info). UPPER BODY DRESSING Daily Assessment    Upper Body Dressing   Dressing Assistance : 6 (Modified independent)     LOWER BODY DRESSING Daily Assessment     While in stand w/ 1x hand on RW and 1x hand on grab bar pt's spouse donned pt's LB clothing after toileting with modA.       MOBILITY/TRANSFERS Daily Assessment    Functional Transfers  Toilet Transfer : Stand pivot transfer with walker  Amount of Assistance Required: 4 (Contact guard assistance)  Tub or Shower Type: Shower  Amount of Assistance Required: 4 (Contact guard assistance)  Adaptive Equipment: Bedside commode;Grab bars; Tub transfer bench;Walker (comment); Wheelchair(RW)       ASSESSMENT:  Pt limited by varying blood pressure in today's session. Pt's spouse present for ADL training with spouse providing appropriate cues for pt, requiring 1-2x (A) from OT for clearing left LE prior to sitting. Pt would benefit from TTB mobility to prevent left LE from crossing midline when moving on tub bench prior to standing after shower. Progression toward goals:  ?          Improving appropriately and progressing toward goals  ? Improving slowly and progressing toward goals  ? Not making progress toward goals and plan of care will be adjusted     PLAN:  Patient continues to benefit from skilled intervention to address the above impairments. Continue treatment per established plan of care. Discharge Recommendations:  Home Health  Further Equipment Recommendations for Discharge:  3:1     Activity Tolerance:  Fair - to Poor (pt not tolerating static standing in today's session, requiring continuous cues for pt to engage in rest breaks and energy conservation)       Estimated LOS: 7/23/19    Please refer to the flowsheet for vital signs taken during this treatment. After treatment:   ?  Patient left in no apparent distress sitting up in recliner with spouse present    COMMUNICATION/EDUCATION:   ? Home safety education was provided and the patient/caregiver indicated understanding. ? Patient/family have participated as able in goal setting and plan of care. ? Patient/family agree to work toward stated goals and plan of care. ? Patient understands intent and goals of therapy, but is neutral about his/her participation. ? Patient is unable to participate in goal setting and plan of care.      Makayla Berg, OTR/L

## 2019-07-20 LAB
INR PPP: 2.3 (ref 0.8–1.2)
PROTHROMBIN TIME: 25.4 SEC (ref 11.5–15.2)

## 2019-07-20 PROCEDURE — 97110 THERAPEUTIC EXERCISES: CPT

## 2019-07-20 PROCEDURE — 65310000000 HC RM PRIVATE REHAB

## 2019-07-20 PROCEDURE — 97116 GAIT TRAINING THERAPY: CPT

## 2019-07-20 PROCEDURE — 97530 THERAPEUTIC ACTIVITIES: CPT

## 2019-07-20 PROCEDURE — 74011250637 HC RX REV CODE- 250/637: Performed by: INTERNAL MEDICINE

## 2019-07-20 PROCEDURE — 36415 COLL VENOUS BLD VENIPUNCTURE: CPT

## 2019-07-20 PROCEDURE — 85610 PROTHROMBIN TIME: CPT

## 2019-07-20 RX ORDER — WARFARIN 4 MG/1
4 TABLET ORAL ONCE
Status: COMPLETED | OUTPATIENT
Start: 2019-07-20 | End: 2019-07-20

## 2019-07-20 RX ADMIN — Medication 5000 UNITS: at 08:07

## 2019-07-20 RX ADMIN — HYDROCODONE BITARTRATE AND ACETAMINOPHEN 1 TABLET: 5; 325 TABLET ORAL at 13:44

## 2019-07-20 RX ADMIN — METHOCARBAMOL TABLETS 500 MG: 500 TABLET, COATED ORAL at 06:14

## 2019-07-20 RX ADMIN — METHOCARBAMOL TABLETS 500 MG: 500 TABLET, COATED ORAL at 21:08

## 2019-07-20 RX ADMIN — CALCIUM CITRATE 200 MG (950 MG) TABLET 200 MG: at 17:35

## 2019-07-20 RX ADMIN — METHOCARBAMOL TABLETS 500 MG: 500 TABLET, COATED ORAL at 13:44

## 2019-07-20 RX ADMIN — HYDROCODONE BITARTRATE AND ACETAMINOPHEN 1 TABLET: 5; 325 TABLET ORAL at 06:14

## 2019-07-20 RX ADMIN — CALCIUM CITRATE 200 MG (950 MG) TABLET 200 MG: at 08:07

## 2019-07-20 RX ADMIN — WARFARIN SODIUM 4 MG: 4 TABLET ORAL at 17:35

## 2019-07-20 RX ADMIN — SIMVASTATIN 10 MG: 10 TABLET, FILM COATED ORAL at 21:08

## 2019-07-20 RX ADMIN — FERROUS SULFATE TAB 325 MG (65 MG ELEMENTAL FE) 325 MG: 325 (65 FE) TAB at 08:08

## 2019-07-20 RX ADMIN — HYDROCODONE BITARTRATE AND ACETAMINOPHEN 1 TABLET: 5; 325 TABLET ORAL at 18:45

## 2019-07-20 RX ADMIN — Medication 250 MG: at 08:07

## 2019-07-20 NOTE — PROGRESS NOTES
[facility] PHYSICAL REHABILITATION  [address] [place] [address], [address]     INPATIENT REHABILITATION  DAILY PROGRESS NOTE     Date: 7/20/2019    Name: Pamela Be. Age / Sex: 80 y.o. / male   CSN: [id number] MRN: [id number]   Admit Date: 7/9/2019 Length of Stay: 11 days     Primary Rehab Diagnosis: Impaired Mobility and ADLs secondary to:  1. S/P Open reduction internal fixation of displaced subtrochanteric periprosthetic fracture around internal prosthetic left hip joint (6/25/2019 - Dr. Biju Bedolla)  2. History of displaced subtrochanteric periprosthetic fracture of left femur around internal prosthetic left hip joint; S/P Left total hip replacement (2/18/2019 - Dr. Biju Bedolla)      Subjective:     No new issues or problems reported. Blood pressure controlled.        Objective:     Vital Signs:  Patient Vitals for the past 24 hrs:   BP Temp Pulse Resp SpO2   07/20/19 0746 138/69 98.4 °F (36.9 °C) 68 17 97 %   07/19/19 2138 141/72 98.7 °F (37.1 °C) 71 18 98 %   07/19/19 1559 112/61 98 °F (36.7 °C) 69 18 96 %   07/19/19 1420 127/69  77  92 %   07/19/19 1135 121/65  70  98 %   07/19/19 1121 131/69  76     07/19/19 1114 133/66 97.8 °F (36.6 °C) 81 14 98 %   07/19/19 1034 116/73  82  97 %   07/19/19 1024 105/68  96  96 %   07/19/19 1019 101/75  (!) 101  97 %        Current Medications:  Current Facility-Administered Medications   Medication Dose Route Frequency    diclofenac (FLECTOR) 1.3 % transdermal patch 1 Patch  1 Patch TransDERmal Q12H    cholecalciferol (VITAMIN D3) capsule 5,000 Units  5,000 Units Oral DAILY    calcium citrate tablet 200 mg [elemental]  200 mg Oral BID    warfarin - physician to dose   Other Q24H    ferrous sulfate tablet 325 mg  1 Tab Oral DAILY WITH BREAKFAST    ascorbic acid (vitamin C) (VITAMIN C) tablet 250 mg  250 mg Oral DAILY WITH BREAKFAST    methocarbamol (ROBAXIN) tablet 500 mg  500 mg Oral Q8H    acetaminophen (TYLENOL) tablet 650 mg 650 mg Oral Q4H PRN    bisacodyl (DULCOLAX) tablet 10 mg  10 mg Oral Q48H PRN    HYDROcodone-acetaminophen (NORCO) 5-325 mg per tablet 1 Tab  1 Tab Oral Q4H PRN    simvastatin (ZOCOR) tablet 10 mg  10 mg Oral QHS       Allergies:  No Known Allergies      Lab/Data Review:  Recent Results (from the past 24 hour(s))   PROTHROMBIN TIME + INR    Collection Time: 07/20/19  6:22 AM   Result Value Ref Range    Prothrombin time 25.4 (H) 11.5 - 15.2 sec    INR 2.3 (H) 0.8 - 1.2         Estimated Glomerular Filtration Rate:  On admission, estimated GFR based on a Creatinine of 0.89 mg/dl:              Using CKD-EPI = 78.5 mL/min/1.73m2              Using MDRD = 86.6 mL/min/1.73m2      Assessment:     Primary Rehabilitation Diagnosis  1. Impaired Mobility and ADLs  2. S/P Open reduction internal fixation of displaced subtrochanteric periprosthetic fracture around internal prosthetic left hip joint (6/25/2019 - Dr. Sabine Pisano)  3.  History of displaced subtrochanteric periprosthetic fracture of left femur around internal prosthetic left hip joint; S/P Left total hip replacement (2/18/2019 - Dr. 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    Mild aortic stenosis I35.0    Acute blood loss as cause of postoperative anemia D62    Acute pulmonary embolism  I26.99    Anticoagulated on warfarin Z79.01    Primary osteoarthritis involving multiple joints M15.0    Gastroesophageal reflux disease K21.9    Dyslipidemia E78.5    Diverticulosis K57.90    History of deep vein thrombosis of lower extremity Z86.718    Sick sinus syndrome  I49.5    Benign prostatic hyperplasia with lower urinary tract symptoms N40.1    Tremor R25.1    Obesity, Class I, BMI 30-34.9 E66.9    Vitamin D deficiency E55.9    Essential tremor G25.0    History of pericarditis Z86.79        Plan:     1. Justification for continued stay: Good progression towards established rehabilitation goals. 2. Medical Issues being followed closely:    [x]  Fall and safety precautions     [x]  Wound Care     [x]  Bowel and Bladder Function     [x]  Fluid Electrolyte and Nutrition Balance     [x]  Pain Control      3. Issues that 24 hour rehabilitation nursing is following:    [x]  Fall and safety precautions     [x]  Wound Care     [x]  Bowel and Bladder Function     [x]  Fluid Electrolyte and Nutrition Balance     [x]  Pain Control      [x]  Assistance with and education on in-room safety with transfers to and from the bed, wheelchair, toilet and shower. 4. Acute rehabilitation plan of care:    [x]  Continue current care and rehab. [x]  Physical Therapy           [x]  Occupational Therapy           []  Speech Therapy     []  Hold Rehab until further notice     5. Medications:    [x]  MAR Reviewed     [x]  Continue Present Medications     6. DVT Prophylaxis:      []  Lovenox     []  Unfractionated Heparin     [x]  Coumadin     []  NOAC     []  WINDY Stockings     []  Sequential Compression Device     []  None     7. Orders:   > S/P Open reduction internal fixation of displaced subtrochanteric periprosthetic fracture around internal prosthetic left hip joint (6/25/2019 - Dr. Carlos Cartagena);  History of displaced subtrochanteric periprosthetic fracture of left femur around internal prosthetic left hip joint; S/P Left total hip replacement (2/18/2019 - Dr. Carlos Cartagena)              > Nonweightbearing on the left lower extremity              > Total hip precautions on left hip              > Staples were removed on 7/9/2019               > On 7/10/2019, started Calcium citrate 1 tab PO BID   > Continue Calcium citrate 1 tab PO BID     > Acute Postoperative Blood Loss Anemia              > Hgb/Hct (7/10/2019, on admission) = 8.8/28.0              > Anemia work-up showed serum iron 40, TIBC 237, iron % saturation 17, ferritin 568, reticulocyte count 5.0   > On 7/10/2019, started FeSO4 and Ascorbic acid   > Hgb/Hct (7/15/2019) = 9.3/29.5    > Hgb/Hct (7/18/2019) = 9.3/29.6    > Continue:               > FeSO4 325 mg PO once daily with breakfast                > Ascorbic Acid 250 mg PO once daily with breakfast (to enhance the absorption of the FeSO4)      > Acute pulmonary embolism, anticoagulated on Warfarin              > Target INR = 2 to 3              > INR (7/20/2019) = 2.3              > Patient received Warfarin 4 mg PO last night               > Warfarin 4 mg PO tonight                > Sepsis due to Pseudomonas aeruginosa              > Blood culture (7/5/2019; 1 out of 2 sets) yielded growth of PSeudomonas aeruginosa (PANSENSITIVE)              > On 7/5/2019, patient was empirically started on Piperacillin-Tazobactam 4.5 grams IV q 6 hr and a single dose of Vancomycin IV was given              > On 7/9/2019, Piperacillin-Tazobactam IV was discontinued and patient was discharged from [facility] on oral Ciprofloxacin 500 mg PO q 12 hr x 4 days              > On 7/13/2019, patient had completed the recommended 4-day treatment course of Ciprofloxacin 500 mg PO q 12 hr      > Vitamin D Deficiency              > Vitamin D 25-Hydroxy (7/10/2019) = 18.6              > On 7/10/2019, patient was given Cholecalciferol 50,000 units PO x 1 dose               > On 7/11/2019, started Cholecalciferol 5,000 units PO once daily   > Continue Cholecalciferol 5,000 units PO once daily     > Increased urinary frequency   > Patient stated he has had increased urinary frequency even prior to admission to the [facility] care hospital; patient inquired of options of treatment for increased frequency / possible prostate issues; patient stated he tried Tamsulosin in the past with no reported adverse reactions   > On 7/17/2019, started a trial of Tamsulosin 0.4 mg PO once daily   > Discontinue Tamsulosin 0.4 mg PO once daily (re: per request of patient - possible cause of postural hypotension noted this AM with therapy)    > Difficulty sleeping   > On 7/17/2019, added Diphenhydramine 25 mg PO q HS PRN for sleep   > On 7/19/2019, discontinued Diphenhydramine 25 mg PO q HS PRN for sleep (re: per request of patient)    > Analgesia   > On 7/15/2019, added Diclofenac 1.3% transdermal patch, 1 patch on skin of left thigh q 12 hr   > Continue:               > Acetaminophen 650 mg PO q 4 hr PRN for pain level less than 5/10    > Diclofenac 1.3% transdermal patch, 1 patch on skin of left thigh q 12 hr               > Methocarbamol 500 mg PO q 8 hr                > Norco 5/325 1 tab PO q 4 hr PRN for pain level greater than 4/10      > Diet:              > Specifications: Low saturated fat              > Solids (consistency): Regular               > Liquids (consistency):  Thin       Signed:    Lianna Gilmore MD    July 20, 2019

## 2019-07-20 NOTE — PROGRESS NOTES
SHIFT CHANGE NOTE FOR [facility]    Bedside and Verbal shift change report given to Dustin Merchant LPN (oncoming nurse) by Letty Marc RN (offgoing nurse). Report included the following information SBAR, Kardex, MAR and Recent Results. Situation:   Code Status: Full Code   Reason for Admission: S/P ORIF  Hospital Day: 11   Problem List:   Hospital Problems  Date Reviewed: 7/20/2019          Codes Class Noted POA    Increased urinary frequency (Chronic) ICD-10-CM: R35.0  ICD-9-CM: 788.41  7/16/2019 Yes        Vitamin D deficiency (Chronic) ICD-10-CM: E55.9  ICD-9-CM: 268.9  7/10/2019 Yes    Overview Signed 7/10/2019 11:23 AM by Shalonda Ames MD     Vitamin D 25-Hydroxy (7/10/2019) = 18.6              Sepsis due to Pseudomonas species [facility]) ICD-10-CM: A41.52  ICD-9-CM: 038.43, 995.91  7/5/2019 Yes    Overview Addendum 7/6/2019  8:16 PM by Adan Quinn MD     7/5/19      80 y.o. male recently admitted for femur fracturesent from skilled nursing facility for fever to 102.9. Patient was recently seen at [facility] for hip replacement status post fall. Patient endorses retrosternal chest pain that started upon waking this morning has been persistent since without propagating or palliating factors. Patient also endorses increased bilateral lower extremity swelling and difficulty breathing. WBC 29.2  P-87%,  U/A neg  CT  Heterogeneous collection posterior to the greater trochanter, most consistent with a postoperative hematoma. No rim enhancement to suggest abscess  CXR Small bilateral pleural effusions, left greater than right. Mild pulmonary edema.  No consolidation  7/6/19 ORTHO (Sammi Donis) Benign appearing left hip and femur now 11 days s/p ORIF periprosthetic hip fracture                         Impaired mobility and ADLs ICD-10-CM: Z74.09  ICD-9-CM: 799.89  7/5/2019 Yes        Acute pulmonary embolism ([facility] [address] ICD-10-CM: I26.99  ICD-9-CM: 415.19  7/1/2019 Yes    Overview Signed 7/9/2019  5:07 PM by Tiffany Rodrigues, Ada Leblanc MD     Pulmonary emboli at the periphery of the right main pulmonary artery extending into the middle and lower lobe branches             Anticoagulated on warfarin (Chronic) ICD-10-CM: Z79.01  ICD-9-CM: V58.61  7/1/2019 Yes        * (Principal) Status post open reduction with internal fixation of fracture ICD-10-CM: Z96.7, Z87.81  ICD-9-CM: V45.89, V15.51  6/25/2019 Yes    Overview Signed 7/9/2019  4:52 PM by Sylvia Fisher MD     S/P Open reduction internal fixation of displaced subtrochanteric periprosthetic fracture around internal prosthetic left hip joint (6/25/2019 - Dr. Lulu Wang)             Acute blood loss as cause of postoperative anemia ICD-10-CM: D62  ICD-9-CM: 285.1  6/25/2019 Yes        Periprosthetic fracture around internal prosthetic left hip joint ([facility] ICD-10-CM: M97. 02XA  ICD-9-CM: 996.44, V43.64  6/24/2019 Yes    Overview Signed 7/9/2019  5:04 PM by Sylvia Fisher MD     S/P Open reduction internal fixation of displaced subtrochanteric periprosthetic fracture around internal prosthetic left hip joint (6/25/2019 - Dr. Lulu Wang)             Displaced subtrochanteric fracture of left femur [facility]) ICD-10-CM: M02.89FI  ICD-9-CM: 820.22  6/24/2019 Yes    Overview Signed 7/9/2019  5:02 PM by Sylvia Fisher MD     S/P Open reduction internal fixation of displaced subtrochanteric periprosthetic fracture around internal prosthetic left hip joint (6/25/2019 - Dr. Lulu Wang)             History of total left hip arthroplasty ICD-10-CM: W02.239  ICD-9-CM: V43.64  2/18/2019 Yes    Overview Addendum 7/9/2019  5:01 PM by Sylvia Fisher MD     S/P Left total hip replacement (2/18/2019 - Dr. Lulu Wang)                   Background:   Past Medical History:   Past Medical History:   Diagnosis Date    Acute blood loss as cause of postoperative anemia 6/25/2019    Acute pulmonary embolism ([address] 7/1/2019    Pulmonary emboli at the periphery of the right main pulmonary artery extending into the middle and lower lobe branches    Anticoagulated on warfarin 7/1/2019    Benign prostatic hyperplasia with lower urinary tract symptoms     Demand ischemia of myocardium ([facility] [address] 7/6/2019 4/16/18 NST EF 59% , small, reversible apical defect 7/2/19 ECHO EF 68%, LVH 14/16, LAE, RVE, ANDREY 1.4cm2 with 12mm mean gradient    Diverticulosis     Dyslipidemia     Essential tremor     Gastroesophageal reflux disease     History of pericarditis     Obesity, Class I, BMI 30-34.9     Paroxysmal atrial fibrillation ([address]     CVAL Maddy Saleh FNP 2008  AF ablation 9/11/13 EPS with isolation of RSPV and LIPV 6/4/18 TEDDY  EF 55%, 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 male recently admitted for femur fracturesent from skilled nursing facility for fever to 102.9. Patient was recently seen at [facility] for hip replacement status post fall. Patient endorses retrosternal chest pain that started upon waking this morning has been persistent since without propagating or palliating factors.   Patient also endorses increased bilateral lower extremity s    Sick sinus syndrome ([facility] [address]     Vitamin D deficiency 7/10/2019    Vitamin D 25-Hydroxy (7/10/2019) = 18.6       Patient taking anticoagulants yes Coumadin   Patient has a defibrillator: no     Assessment:   Changes in Assessment throughout shift: no     Patient has central line: no Reasons if yes: na  Insertion date:na Last dressing date:na   Patient has Truong Cath: no Reasons if yes: n/a   Insertion date:n/a     Last Vitals:     Vitals:    07/19/19 1559 07/19/19 2138 07/20/19 0746 07/20/19 1621   BP: 112/61 141/72 138/69 119/68   Pulse: 69 71 68 70   Resp: 18 18 17 18   Temp: 98 °F (36.7 °C) 98.7 °F (37.1 °C) 98.4 °F (36.9 °C) 96.9 °F (36.1 °C)   SpO2: 96% 98% 97% 98%   Weight: Height:            PAIN    Pain Assessment    Pain Intensity 1: 0 (07/20/19 1546) Pain Intensity 1: 2 (12/29/14 1105)    Pain Location 1: Hip Pain Location 1: Abdomen    Pain Intervention(s) 1: Medication (see MAR) Pain Intervention(s) 1: Medication (see MAR)  Patient Stated Pain Goal: 0 Patient Stated Pain Goal: 0  o Intervention effective: yes    o Other actions taken for pain: no     Skin Assessment  Skin color Skin Color: Appropriate for ethnicity  Condition/Temperature Skin Condition/Temp: Warm  Integrity Skin Integrity: Incision (comment)  Turgor Turgor: Non-tenting  Weekly Pressure Ulcer Documentation  Pressure  Injury Documentation: No Pressure Injury Noted-Pressure Ulcer Prevention Initiated  Wound Prevention & Protection Methods  Orientation of wound Orientation of Wound Prevention: Posterior  Location of Prevention Location of Wound Prevention: Sacrum/Coccyx  Dressing Present Dressing Present : No  Dressing Status    Wound Offloading Wound Offloading (Prevention Methods): Bed, pressure redistribution/air     INTAKE/OUPUT  Date 07/19/19 0700 - 07/20/19 0659 07/20/19 0700 - 07/21/19 0659   Shift 2822-9224 2735-7369 24 Hour Total 9791-9978 6195-3613 24 Hour Total   INTAKE   P.O. 360  360        P. O. 360  360      Shift Total(mL/kg) 360(3.5)  360(3.5)      OUTPUT   Urine(mL/kg/hr)  975(0.8) 975(0.4)        Urine Voided  975 975        Urine Occurrence(s) 1 x 4 x 5 x 4 x  4 x   Stool           Stool Occurrence(s) 1 x 0 x 1 x 1 x  1 x   Shift Total(mL/kg)  975(9.4) 975(9.4)       975 -615      Weight (kg) 103.4 103.4 103.4 103.4 103.4 103.4       Recommendations:  1. Patient needs and requests: addressed    2. Diet: cardiac    3. Pending tests/procedures: none     4. Functional Level/Equipment: whellchair    5.  Estimated Discharge Date: TBD Posted on Whiteboard in Patients Room: no     HEALS Safety Check    A safety check occurred in the patient's room between off going nurse and oncoming nurse listed above. The safety check included the below items  Area Items   H  High Alert Medications - Verify all high alert medication drips (heparin, PCA, etc.)   E  Equipment - Suction is set up for ALL patients (with presley)  - Red plugs utilized for all equipment (IV pumps, etc.)  - WOWs wiped down at end of shift.  - Room stocked with oxygen, suction, and other unit-specific supplies   A  Alarms - Bed alarm is set for fall risk patients  - Ensure chair alarm is in place and activated if patient is up in a chair   L  Lines - Check IV for any infiltration  - Truong bag is empty if patient has a Truong   - Tubing and IV bags are labeled   S  Safety   - Room is clean, patient is clean, and equipment is clean. - Hallways are clear from equipment besides carts. - Fall bracelet on for fall risk patients  - Ensure room is clear and free of clutter  - Suction is set up for ALL patients (with presley)  - Hallways are clear from equipment besides carts.    - Isolation precautions followed, supplies available outside room, sign posted

## 2019-07-20 NOTE — PROGRESS NOTES
Problem: Mobility Impaired (Adult and Pediatric)  Goal: *Acute Goals and Plan of Care (Insert Text)  Description  Physical Therapy Goals  Short Term Goals = Long Term Goals  Initiated 7/10/2019 and to be accomplished within 2-3 weeks (2019-2019)  1. Patient will move from supine to sit and sit to supine , scoot up and down and roll side to side in bed with modified independence. 2.  Patient will transfer from bed to chair and chair to bed with close supervision/set-up using the least restrictive device. 3.  Patient will perform sit to stand with close supervision/set-up. 4.  Patient will ambulate with close supervision/set-up for 10 feet with RW maintaining NWB left LE. 5.  Patient will propel w/c over level surfaces greater than 150 feet and over entryways, thresholds, and ramps with modified independence. Outcome: Progressing Towards Goal   PHYSICAL THERAPY TREATMENT    Patient: Julio Mitchell. (80 y.o. male)  Date: 2019  Diagnosis: Status post open reduction with internal fixation of fracture [Z96.7, Z87.81] Status post open reduction with internal fixation of fracture  Precautions: Fall Risk Precautions, NWB Left LE, THP Left LE  Chart, physical therapy assessment, plan of care and goals were reviewed. Time In: 1100  Time Out: 1230  Patient Seen For: Balance activities;Gait training;Patient education;Transfer training; Wheelchair mobility  Pain:  Pt reports he had pain from a headache and some left LE pain which he received medication for earlier in the morning. Patient identified with name and : yes    SUBJECTIVE:     Pt reports feeling better from previous attempt at treatment session on 2019 and is agreeable to participation in skilled PT intervention without c/o dizziness throughout treatment session.     OBJECTIVE DATA SUMMARY:   Objective:     BED/MAT MOBILITY Daily Assessment    Supine to Sit : 4 (Minimal assistance)  Sit to Supine : 4 (Minimal assistance) Pt requires minimal assistance for safe left LE management to/from dependent position. TRANSFERS Daily Assessment    Other: stand step with RW  Transfer Assistance : 4 (Contact guard assistance)  Sit to Stand Assistance: Contact guard assistance   Pt requires CGA for balance and safety with functional transfers with mod verbal cues for weight shifting to maintain left LE NWB throughout transitions from sit to stand and stand to sit. GAIT Daily Assessment    Amount of Assistance: 4 (Contact guard assistance)  Distance (ft): 16 Feet (ft)(x 2 trials)  Assistive Device: Walker, rolling   Pt ambulates with CGA for safety and mod tactile and verbal cuing for scap add and depression with B LE weight bearing on RW. Pt maintains left LE NWB throughout but ambulates with forward head, downward gaze and rounded shoulder posture with decreased right step length. BALANCE Daily Assessment    Sitting - Static: Good (unsupported)  Sitting - Dynamic: Good (unsupported)  Standing - Static: Poor  Standing - Dynamic : Impaired       WHEELCHAIR MOBILITY Daily Assessment    Pt propels w/c on unit with modified independence. THERAPEUTIC EXERCISES Daily Assessment    Exercise Type #1: Supine lower extremity strengthening  Sets Performed: 3  Reps Performed: 10   Glut Sets x 5\" Holds  Quad Sets x 5\" Holds  4# Right, AAROM Left SAQ  Ankle Pumps  Right and Left Hip Abd/Add (to neutral)  Exercise Type #1: Seated lower extremity strengthening  Sets Performed: 3  Reps Performed: 10  Isometric Hip Add x 5\" Holds  AAROM Left LAQ, 4\" Right LAQ  Seated EOM Left Heel Slides/Knee Flexion Stretch x 7 minutes       ASSESSMENT:  Pt is progressing participating in gait training this session while consistently maintaining left LE NWB. Progression toward goals:  ?      Improving appropriately and progressing toward goals  ? Improving slowly and progressing toward goals  ?       Not making progress toward goals and plan of care will be adjusted     PLAN:  Patient continues to benefit from skilled intervention to address the above impairments. Continue treatment per established plan of care. Pt and pt's wife to particiapte in family education car transfer training on Monday 7/22/2019. Discharge Recommendations:  Home Health Physical Therapy  Further Equipment Recommendations for Discharge:  rolling walker (pt owns), wheelchair 18 inch and 32\" transfer board     Estimated LOS: 7/23/2019    Activity Tolerance:   Good  Please refer to the flowsheet for vital signs taken during this treatment. After treatment:   Patient left self propelling w/c from gym to dining room in no apparent distress.       Mariluz Croft PT, DPT  7/20/2019

## 2019-07-21 LAB
INR PPP: 2.4 (ref 0.8–1.2)
PROTHROMBIN TIME: 25.9 SEC (ref 11.5–15.2)

## 2019-07-21 PROCEDURE — 97530 THERAPEUTIC ACTIVITIES: CPT

## 2019-07-21 PROCEDURE — 65310000000 HC RM PRIVATE REHAB

## 2019-07-21 PROCEDURE — 36415 COLL VENOUS BLD VENIPUNCTURE: CPT

## 2019-07-21 PROCEDURE — 85610 PROTHROMBIN TIME: CPT

## 2019-07-21 PROCEDURE — 74011250637 HC RX REV CODE- 250/637: Performed by: INTERNAL MEDICINE

## 2019-07-21 RX ORDER — WARFARIN 4 MG/1
4 TABLET ORAL ONCE
Status: COMPLETED | OUTPATIENT
Start: 2019-07-21 | End: 2019-07-21

## 2019-07-21 RX ORDER — DOCUSATE SODIUM 100 MG/1
100 CAPSULE, LIQUID FILLED ORAL
Status: DISCONTINUED | OUTPATIENT
Start: 2019-07-21 | End: 2019-07-22

## 2019-07-21 RX ADMIN — HYDROCODONE BITARTRATE AND ACETAMINOPHEN 1 TABLET: 5; 325 TABLET ORAL at 06:27

## 2019-07-21 RX ADMIN — METHOCARBAMOL TABLETS 500 MG: 500 TABLET, COATED ORAL at 21:01

## 2019-07-21 RX ADMIN — DOCUSATE SODIUM 100 MG: 100 CAPSULE, LIQUID FILLED ORAL at 21:01

## 2019-07-21 RX ADMIN — METHOCARBAMOL TABLETS 500 MG: 500 TABLET, COATED ORAL at 13:14

## 2019-07-21 RX ADMIN — SIMVASTATIN 10 MG: 10 TABLET, FILM COATED ORAL at 21:00

## 2019-07-21 RX ADMIN — CALCIUM CITRATE 200 MG (950 MG) TABLET 200 MG: at 08:50

## 2019-07-21 RX ADMIN — ACETAMINOPHEN 650 MG: 325 TABLET ORAL at 00:25

## 2019-07-21 RX ADMIN — HYDROCODONE BITARTRATE AND ACETAMINOPHEN 1 TABLET: 5; 325 TABLET ORAL at 12:10

## 2019-07-21 RX ADMIN — HYDROCODONE BITARTRATE AND ACETAMINOPHEN 1 TABLET: 5; 325 TABLET ORAL at 21:01

## 2019-07-21 RX ADMIN — HYDROCODONE BITARTRATE AND ACETAMINOPHEN 1 TABLET: 5; 325 TABLET ORAL at 15:25

## 2019-07-21 RX ADMIN — FERROUS SULFATE TAB 325 MG (65 MG ELEMENTAL FE) 325 MG: 325 (65 FE) TAB at 08:54

## 2019-07-21 RX ADMIN — Medication 250 MG: at 08:50

## 2019-07-21 RX ADMIN — CALCIUM CITRATE 200 MG (950 MG) TABLET 200 MG: at 18:55

## 2019-07-21 RX ADMIN — Medication 5000 UNITS: at 08:50

## 2019-07-21 RX ADMIN — METHOCARBAMOL TABLETS 500 MG: 500 TABLET, COATED ORAL at 06:27

## 2019-07-21 RX ADMIN — WARFARIN SODIUM 4 MG: 4 TABLET ORAL at 18:54

## 2019-07-21 NOTE — PROGRESS NOTES
Problem: Falls - Risk of  Goal: *Absence of Falls  Description  Document Virginia Ontiveros Fall Risk and appropriate interventions in the flowsheet. Outcome: Progressing Towards Goal  Note:   Fall Risk Interventions:  Mobility Interventions: Patient to call before getting OOB    Mentation Interventions: Adequate sleep, hydration, pain control    Medication Interventions: Patient to call before getting OOB    Elimination Interventions: Call light in reach, Stay With Me (per policy)    History of Falls Interventions: Bed/chair exit alarm         Problem: Patient Education: Go to Patient Education Activity  Goal: Patient/Family Education  Outcome: Progressing Towards Goal     Problem: Pressure Injury - Risk of  Goal: *Prevention of pressure injury  Description  Document Kartik Scale and appropriate interventions in the flowsheet.   Outcome: Progressing Towards Goal  Note:   Pressure Injury Interventions:       Moisture Interventions: Minimize layers    Activity Interventions: Increase time out of bed, Pressure redistribution bed/mattress(bed type)    Mobility Interventions: HOB 30 degrees or less, Pressure redistribution bed/mattress (bed type)    Nutrition Interventions: Document food/fluid/supplement intake    Friction and Shear Interventions: Minimize layers                Problem: Patient Education: Go to Patient Education Activity  Goal: Patient/Family Education  Outcome: Progressing Towards Goal     Problem: Nutrition Deficit  Goal: *Optimize nutritional status  Outcome: Progressing Towards Goal     Problem: Pain  Goal: *Control of Pain  Outcome: Progressing Towards Goal  Goal: *PALLIATIVE CARE:  Alleviation of Pain  Outcome: Progressing Towards Goal     Problem: Patient Education: Go to Patient Education Activity  Goal: Patient/Family Education  Outcome: Progressing Towards Goal

## 2019-07-21 NOTE — PROGRESS NOTES
SHIFT CHANGE NOTE FOR [facility]    Bedside and Verbal shift change report given to Gaby Will RN (oncoming nurse) by Yomaira Almendarez RN (offgoing nurse). Report included the following information SBAR, Kardex, MAR and Recent Results. Situation:   Code Status: Full Code   Reason for Admission: S/P ORIF  Hospital Day: 12   Problem List:   Hospital Problems  Date Reviewed: 7/21/2019          Codes Class Noted POA    Increased urinary frequency (Chronic) ICD-10-CM: R35.0  ICD-9-CM: 788.41  7/16/2019 Yes        Vitamin D deficiency (Chronic) ICD-10-CM: E55.9  ICD-9-CM: 268.9  7/10/2019 Yes    Overview Signed 7/10/2019 11:23 AM by Cal Hashimoto, MD     Vitamin D 25-Hydroxy (7/10/2019) = 18.6              Sepsis due to Pseudomonas species [facility]) ICD-10-CM: A41.52  ICD-9-CM: 038.43, 995.91  7/5/2019 Yes    Overview Addendum 7/6/2019  8:16 PM by Harleen Newby MD     7/5/19      80 y.o. male recently admitted for femur fracturesent from skilled nursing facility for fever to 102.9. Patient was recently seen at [facility] for hip replacement status post fall. Patient endorses retrosternal chest pain that started upon waking this morning has been persistent since without propagating or palliating factors. Patient also endorses increased bilateral lower extremity swelling and difficulty breathing. WBC 29.2  P-87%,  U/A neg  CT  Heterogeneous collection posterior to the greater trochanter, most consistent with a postoperative hematoma. No rim enhancement to suggest abscess  CXR Small bilateral pleural effusions, left greater than right. Mild pulmonary edema.  No consolidation  7/6/19 ORTHO Prudence Ivy) Benign appearing left hip and femur now 11 days s/p ORIF periprosthetic hip fracture                         Impaired mobility and ADLs ICD-10-CM: Z74.09  ICD-9-CM: 799.89  7/5/2019 Yes        Acute pulmonary embolism ([facility] [address] ICD-10-CM: I26.99  ICD-9-CM: 415.19  7/1/2019 Yes    Overview Signed 7/9/2019  5:07 PM by Murtaza Leblanc MD KRIS     Pulmonary emboli at the periphery of the right main pulmonary artery extending into the middle and lower lobe branches             Anticoagulated on warfarin (Chronic) ICD-10-CM: Z79.01  ICD-9-CM: V58.61  7/1/2019 Yes        * (Principal) Status post open reduction with internal fixation of fracture ICD-10-CM: Z96.7, Z87.81  ICD-9-CM: V45.89, V15.51  6/25/2019 Yes    Overview Signed 7/9/2019  4:52 PM by Juanita Motley MD     S/P Open reduction internal fixation of displaced subtrochanteric periprosthetic fracture around internal prosthetic left hip joint (6/25/2019 - Dr. Mike Diallo)             Acute blood loss as cause of postoperative anemia ICD-10-CM: D62  ICD-9-CM: 285.1  6/25/2019 Yes        Periprosthetic fracture around internal prosthetic left hip joint ([facility] [address] ICD-10-CM: M97. 02XA  ICD-9-CM: 996.44, V43.64  6/24/2019 Yes    Overview Signed 7/9/2019  5:04 PM by Juanita Motley MD     S/P Open reduction internal fixation of displaced subtrochanteric periprosthetic fracture around internal prosthetic left hip joint (6/25/2019 - Dr. Mike Diallo)             Displaced subtrochanteric fracture of left femur [facility]) ICD-10-CM: D11.79LQ  ICD-9-CM: 820.22  6/24/2019 Yes    Overview Signed 7/9/2019  5:02 PM by Juanita Motley MD     S/P Open reduction internal fixation of displaced subtrochanteric periprosthetic fracture around internal prosthetic left hip joint (6/25/2019 - Dr. Mike Diallo)             History of total left hip arthroplasty ICD-10-CM: U80.795  ICD-9-CM: V43.64  2/18/2019 Yes    Overview Addendum 7/9/2019  5:01 PM by Juanita Motley MD     S/P Left total hip replacement (2/18/2019 - Dr. Mike Diallo)                   Background:   Past Medical History:   Past Medical History:   Diagnosis Date    Acute blood loss as cause of postoperative anemia 6/25/2019    Acute pulmonary embolism ([address] 7/1/2019    Pulmonary emboli at the periphery of the right main pulmonary artery extending into the middle and lower lobe branches    Anticoagulated on warfarin 7/1/2019    Benign prostatic hyperplasia with lower urinary tract symptoms     Demand ischemia of myocardium ([facility] [address] 7/6/2019 4/16/18 NST EF 59% , small, reversible apical defect 7/2/19 ECHO EF 68%, LVH 14/16, LAE, RVE, ANDREY 1.4cm2 with 12mm mean gradient    Diverticulosis     Dyslipidemia     Essential tremor     Gastroesophageal reflux disease     History of pericarditis     Obesity, Class I, BMI 30-34.9     Paroxysmal atrial fibrillation ([address]     CVAL Maddy Saleh FNP 2008  AF ablation 9/11/13 EPS with isolation of RSPV and LIPV 6/4/18 TEDDY  EF 55%, 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 male recently admitted for femur fracturesent from skilled nursing facility for fever to 102.9. Patient was recently seen at [facility] for hip replacement status post fall. Patient endorses retrosternal chest pain that started upon waking this morning has been persistent since without propagating or palliating factors.   Patient also endorses increased bilateral lower extremity s    Sick sinus syndrome ([address]     Vitamin D deficiency 7/10/2019    Vitamin D 25-Hydroxy (7/10/2019) = 18.6       Patient taking anticoagulants yes Coumadin   Patient has a defibrillator: no     Assessment:   Changes in Assessment throughout shift: no     Patient has central line: no Reasons if yes: na  Insertion date:na Last dressing date:na   Patient has Truong Cath: no Reasons if yes: n/a   Insertion date:n/a     Last Vitals:     Vitals:    07/20/19 1621 07/20/19 2155 07/21/19 0728 07/21/19 1552   BP: 119/68 135/72 119/76 134/67   Pulse: 70 70 63 72   Resp: 18 20 17 16   Temp: 96.9 °F (36.1 °C) 98 °F (36.7 °C) 98.1 °F (36.7 °C) 98.4 °F (36.9 °C)   SpO2: 98% 97% 97% 97%   Weight: Height:            PAIN    Pain Assessment    Pain Intensity 1: 3 (07/21/19 1630) Pain Intensity 1: 2 (12/29/14 1105)    Pain Location 1: Hip Pain Location 1: Abdomen    Pain Intervention(s) 1: Medication (see MAR) Pain Intervention(s) 1: Medication (see MAR)  Patient Stated Pain Goal: 0 Patient Stated Pain Goal: 0  o Intervention effective: yes    o Other actions taken for pain: no     Skin Assessment  Skin color Skin Color: Appropriate for ethnicity  Condition/Temperature Skin Condition/Temp: Warm  Integrity Skin Integrity: Incision (comment)  Turgor Turgor: Non-tenting  Weekly Pressure Ulcer Documentation  Pressure  Injury Documentation: No Pressure Injury Noted-Pressure Ulcer Prevention Initiated  Wound Prevention & Protection Methods  Orientation of wound Orientation of Wound Prevention: Posterior  Location of Prevention Location of Wound Prevention: Sacrum/Coccyx  Dressing Present Dressing Present : No  Dressing Status    Wound Offloading Wound Offloading (Prevention Methods): Bed, pressure redistribution/air     INTAKE/OUPUT  Date 07/20/19 1900 - 07/21/19 0659 07/21/19 0700 - 07/22/19 0659   Shift 9874-8864 24 Hour Total 1845-6887 4155-7264 24 Hour Total   INTAKE   P.O.  960 1560  1560     P. O.  960 1560  1560   Shift Total(mL/kg)  960(9.3) 1560(15.1)  1560(15.1)   OUTPUT   Urine(mL/kg/hr)          Urine Occurrence(s) 3 x 7 x 4 x  4 x   Emesis/NG output          Emesis Occurrence(s)   0 x  0 x   Stool          Stool Occurrence(s) 0 x 1 x 0 x  0 x   Shift Total(mL/kg)        NET  960 1560  1560   Weight (kg) 103.4 103.4 103.4 103.4 103.4       Recommendations:  1. Patient needs and requests: addressed    2. Diet: cardiac    3. Pending tests/procedures: none     4. Functional Level/Equipment: whellchair    5.  Estimated Discharge Date: TBD Posted on Whiteboard in Patients Room: no     HEALS Safety Check    A safety check occurred in the patient's room between off going nurse and oncoming nurse listed above.    The safety check included the below items  Area Items   H  High Alert Medications - Verify all high alert medication drips (heparin, PCA, etc.)   E  Equipment - Suction is set up for ALL patients (with presley)  - Red plugs utilized for all equipment (IV pumps, etc.)  - WOWs wiped down at end of shift.  - Room stocked with oxygen, suction, and other unit-specific supplies   A  Alarms - Bed alarm is set for fall risk patients  - Ensure chair alarm is in place and activated if patient is up in a chair   L  Lines - Check IV for any infiltration  - Truong bag is empty if patient has a Truong   - Tubing and IV bags are labeled   S  Safety   - Room is clean, patient is clean, and equipment is clean. - Hallways are clear from equipment besides carts. - Fall bracelet on for fall risk patients  - Ensure room is clear and free of clutter  - Suction is set up for ALL patients (with presley)  - Hallways are clear from equipment besides carts.    - Isolation precautions followed, supplies available outside room, sign posted

## 2019-07-21 NOTE — PROGRESS NOTES
[facility] PHYSICAL REHABILITATION  [address] [place] [address], [address]     INPATIENT REHABILITATION  DAILY PROGRESS NOTE     Date: 7/21/2019    Name: Zaki Holland. Age / Sex: 80 y.o. / male   CSN: [id number] MRN: [id number]   Admit Date: 7/9/2019 Length of Stay: 12 days     Primary Rehab Diagnosis: Impaired Mobility and ADLs secondary to:  1. S/P Open reduction internal fixation of displaced subtrochanteric periprosthetic fracture around internal prosthetic left hip joint (6/25/2019 - Dr. Rickie Miller)  2. History of displaced subtrochanteric periprosthetic fracture of left femur around internal prosthetic left hip joint; S/P Left total hip replacement (2/18/2019 - Dr. Rickie Miller)      Subjective:     No new issues or problems reported. Blood pressure controlled.        Objective:     Vital Signs:  Patient Vitals for the past 24 hrs:   BP Temp Pulse Resp SpO2   07/21/19 0728 119/76 98.1 °F (36.7 °C) 63 17 97 %   07/20/19 2155 135/72 98 °F (36.7 °C) 70 20 97 %   07/20/19 1621 119/68 96.9 °F (36.1 °C) 70 18 98 %        Current Medications:  Current Facility-Administered Medications   Medication Dose Route Frequency    diclofenac (FLECTOR) 1.3 % transdermal patch 1 Patch  1 Patch TransDERmal Q12H    cholecalciferol (VITAMIN D3) capsule 5,000 Units  5,000 Units Oral DAILY    calcium citrate tablet 200 mg [elemental]  200 mg Oral BID    warfarin - physician to dose   Other Q24H    ferrous sulfate tablet 325 mg  1 Tab Oral DAILY WITH BREAKFAST    ascorbic acid (vitamin C) (VITAMIN C) tablet 250 mg  250 mg Oral DAILY WITH BREAKFAST    methocarbamol (ROBAXIN) tablet 500 mg  500 mg Oral Q8H    acetaminophen (TYLENOL) tablet 650 mg  650 mg Oral Q4H PRN    bisacodyl (DULCOLAX) tablet 10 mg  10 mg Oral Q48H PRN    HYDROcodone-acetaminophen (NORCO) 5-325 mg per tablet 1 Tab  1 Tab Oral Q4H PRN    simvastatin (ZOCOR) tablet 10 mg  10 mg Oral QHS       Allergies:  No Known Allergies      Lab/Data Review:  Recent Results (from the past 24 hour(s))   PROTHROMBIN TIME + INR    Collection Time: 07/21/19  6:20 AM   Result Value Ref Range    Prothrombin time 25.9 (H) 11.5 - 15.2 sec    INR 2.4 (H) 0.8 - 1.2         Estimated Glomerular Filtration Rate:  On admission, estimated GFR based on a Creatinine of 0.89 mg/dl:              Using CKD-EPI = 78.5 mL/min/1.73m2              Using MDRD = 86.6 mL/min/1.73m2      Assessment:     Primary Rehabilitation Diagnosis  1. Impaired Mobility and ADLs  2. S/P Open reduction internal fixation of displaced subtrochanteric periprosthetic fracture around internal prosthetic left hip joint (6/25/2019 - Dr. Andre Balderas)  3. History of displaced subtrochanteric periprosthetic fracture of left femur around internal prosthetic left hip joint; S/P Left total hip replacement (2/18/2019 - Dr. 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    Mild aortic stenosis I35.0    Acute blood loss as cause of postoperative anemia D62    Acute pulmonary embolism  I26.99    Anticoagulated on warfarin Z79.01    Primary osteoarthritis involving multiple joints M15.0    Gastroesophageal reflux disease K21.9    Dyslipidemia E78.5    Diverticulosis K57.90    History of deep vein thrombosis of lower extremity Z86.718    Sick sinus syndrome  I49.5    Benign prostatic hyperplasia with lower urinary tract symptoms N40.1    Tremor R25.1    Obesity, Class I, BMI 30-34.9 E66.9    Vitamin D deficiency E55.9    Essential tremor G25.0    History of pericarditis Z86.79        Plan:     1. Justification for continued stay: Good progression towards established rehabilitation goals.     2. Medical Issues being followed closely:    [x]  Fall and safety precautions [x]  Wound Care     [x]  Bowel and Bladder Function     [x]  Fluid Electrolyte and Nutrition Balance     [x]  Pain Control      3. Issues that 24 hour rehabilitation nursing is following:    [x]  Fall and safety precautions     [x]  Wound Care     [x]  Bowel and Bladder Function     [x]  Fluid Electrolyte and Nutrition Balance     [x]  Pain Control      [x]  Assistance with and education on in-room safety with transfers to and from the bed, wheelchair, toilet and shower. 4. Acute rehabilitation plan of care:    [x]  Continue current care and rehab. [x]  Physical Therapy           [x]  Occupational Therapy           []  Speech Therapy     []  Hold Rehab until further notice     5. Medications:    [x]  MAR Reviewed     [x]  Continue Present Medications     6. DVT Prophylaxis:      []  Lovenox     []  Unfractionated Heparin     [x]  Coumadin     []  NOAC     []  WINDY Stockings     []  Sequential Compression Device     []  None     7. Orders:   > S/P Open reduction internal fixation of displaced subtrochanteric periprosthetic fracture around internal prosthetic left hip joint (6/25/2019 - Dr. Abhay Cardona);  History of displaced subtrochanteric periprosthetic fracture of left femur around internal prosthetic left hip joint; S/P Left total hip replacement (2/18/2019 - Dr. Abhay Cardona)              > Nonweightbearing on the left lower extremity              > Total hip precautions on left hip              > Staples were removed on 7/9/2019               > On 7/10/2019, started Calcium citrate 1 tab PO BID   > Continue Calcium citrate 1 tab PO BID     > Acute Postoperative Blood Loss Anemia              > Hgb/Hct (7/10/2019, on admission) = 8.8/28.0              > Anemia work-up showed serum iron 40, TIBC 237, iron % saturation 17, ferritin 568, reticulocyte count 5.0   > On 7/10/2019, started FeSO4 and Ascorbic acid   > Hgb/Hct (7/15/2019) = 9.3/29.5    > Hgb/Hct (7/18/2019) = 9.3/29.6    > Continue: > FeSO4 325 mg PO once daily with breakfast                > Ascorbic Acid 250 mg PO once daily with breakfast (to enhance the absorption of the FeSO4)      > Acute pulmonary embolism, anticoagulated on Warfarin              > Target INR = 2 to 3              > INR (7/21/2019) = 2.4              > Patient received Warfarin 4 mg PO last night               > Warfarin 4 mg PO tonight                > Sepsis due to Pseudomonas aeruginosa              > Blood culture (7/5/2019; 1 out of 2 sets) yielded growth of PSeudomonas aeruginosa (PANSENSITIVE)              > On 7/5/2019, patient was empirically started on Piperacillin-Tazobactam 4.5 grams IV q 6 hr and a single dose of Vancomycin IV was given              > On 7/9/2019, Piperacillin-Tazobactam IV was discontinued and patient was discharged from [facility] on oral Ciprofloxacin 500 mg PO q 12 hr x 4 days              > On 7/13/2019, patient had completed the recommended 4-day treatment course of Ciprofloxacin 500 mg PO q 12 hr      > Vitamin D Deficiency              > Vitamin D 25-Hydroxy (7/10/2019) = 18.6              > On 7/10/2019, patient was given Cholecalciferol 50,000 units PO x 1 dose               > On 7/11/2019, started Cholecalciferol 5,000 units PO once daily   > Continue Cholecalciferol 5,000 units PO once daily     > Increased urinary frequency   > Patient stated he has had increased urinary frequency even prior to admission to the acute care hospital; patient inquired of options of treatment for increased frequency / possible prostate issues; patient stated he tried Tamsulosin in the past with no reported adverse reactions   > On 7/17/2019, started a trial of Tamsulosin 0.4 mg PO once daily   > Discontinue Tamsulosin 0.4 mg PO once daily (re: per request of patient - possible cause of postural hypotension noted this AM with therapy)    > Difficulty sleeping   > On 7/17/2019, added Diphenhydramine 25 mg PO q HS PRN for sleep   > On 7/19/2019, discontinued Diphenhydramine 25 mg PO q HS PRN for sleep (re: per request of patient)    > Analgesia   > On 7/15/2019, added Diclofenac 1.3% transdermal patch, 1 patch on skin of left thigh q 12 hr   > Continue:               > Acetaminophen 650 mg PO q 4 hr PRN for pain level less than 5/10    > Diclofenac 1.3% transdermal patch, 1 patch on skin of left thigh q 12 hr               > Methocarbamol 500 mg PO q 8 hr                > Norco 5/325 1 tab PO q 4 hr PRN for pain level greater than 4/10      > Diet:              > Specifications: Low saturated fat              > Solids (consistency): Regular               > Liquids (consistency):  Thin       Signed:    David Ballard MD    July 21, 2019

## 2019-07-21 NOTE — PROGRESS NOTES
SHIFT CHANGE NOTE FOR [facility]    Bedside and Verbal shift change report given to Hydrophi (oncoming nurse) by Kiley Guan LPN (offgoing nurse). Report included the following information SBAR, Kardex, MAR and Recent Results. Situation:   Code Status: Full Code   Reason for Admission: S/P ORIF  Hospital Day: 12   Problem List:   Hospital Problems  Date Reviewed: 7/20/2019          Codes Class Noted POA    Increased urinary frequency (Chronic) ICD-10-CM: R35.0  ICD-9-CM: 788.41  7/16/2019 Yes        Vitamin D deficiency (Chronic) ICD-10-CM: E55.9  ICD-9-CM: 268.9  7/10/2019 Yes    Overview Signed 7/10/2019 11:23 AM by Jyoti Concepcion MD     Vitamin D 25-Hydroxy (7/10/2019) = 18.6              Sepsis due to Pseudomonas species [facility]) ICD-10-CM: A41.52  ICD-9-CM: 038.43, 995.91  7/5/2019 Yes    Overview Addendum 7/6/2019  8:16 PM by Cong Duncan MD     7/5/19      80 y.o. male recently admitted for femur fracturesent from skilled nursing facility for fever to 102.9. Patient was recently seen at [facility] for hip replacement status post fall. Patient endorses retrosternal chest pain that started upon waking this morning has been persistent since without propagating or palliating factors. Patient also endorses increased bilateral lower extremity swelling and difficulty breathing. WBC 29.2  P-87%,  U/A neg  CT  Heterogeneous collection posterior to the greater trochanter, most consistent with a postoperative hematoma. No rim enhancement to suggest abscess  CXR Small bilateral pleural effusions, left greater than right. Mild pulmonary edema.  No consolidation  7/6/19 ORTHO Brandyn Emperor) Benign appearing left hip and femur now 11 days s/p ORIF periprosthetic hip fracture                         Impaired mobility and ADLs ICD-10-CM: Z74.09  ICD-9-CM: 799.89  7/5/2019 Yes        Acute pulmonary embolism ([address] ICD-10-CM: I26.99  ICD-9-CM: 415.19  7/1/2019 Yes    Overview Signed 7/9/2019  5:07 PM by Jyoti Concepcion MD     Pulmonary emboli at the periphery of the right main pulmonary artery extending into the middle and lower lobe branches             Anticoagulated on warfarin (Chronic) ICD-10-CM: Z79.01  ICD-9-CM: V58.61  7/1/2019 Yes        * (Principal) Status post open reduction with internal fixation of fracture ICD-10-CM: Z96.7, Z87.81  ICD-9-CM: V45.89, V15.51  6/25/2019 Yes    Overview Signed 7/9/2019  4:52 PM by Grayson Dorsey MD     S/P Open reduction internal fixation of displaced subtrochanteric periprosthetic fracture around internal prosthetic left hip joint (6/25/2019 - Dr. Chyrel Mortimer)             Acute blood loss as cause of postoperative anemia ICD-10-CM: D62  ICD-9-CM: 285.1  6/25/2019 Yes        Periprosthetic fracture around internal prosthetic left hip joint ([facility] [address] ICD-10-CM: M97. 02XA  ICD-9-CM: 996.44, V43.64  6/24/2019 Yes    Overview Signed 7/9/2019  5:04 PM by Grayson Dorsey MD     S/P Open reduction internal fixation of displaced subtrochanteric periprosthetic fracture around internal prosthetic left hip joint (6/25/2019 - Dr. Chyrel Mortimer)             Displaced subtrochanteric fracture of left femur [facility]) ICD-10-CM: H70.79JC  ICD-9-CM: 820.22  6/24/2019 Yes    Overview Signed 7/9/2019  5:02 PM by Grayson Dorsey MD     S/P Open reduction internal fixation of displaced subtrochanteric periprosthetic fracture around internal prosthetic left hip joint (6/25/2019 - Dr. Chyrel Mortimer)             History of total left hip arthroplasty ICD-10-CM: F69.926  ICD-9-CM: V43.64  2/18/2019 Yes    Overview Addendum 7/9/2019  5:01 PM by Grayson Dorsey MD     S/P Left total hip replacement (2/18/2019 - Dr. Chyrel Mortimer)                   Background:   Past Medical History:   Past Medical History:   Diagnosis Date    Acute blood loss as cause of postoperative anemia 6/25/2019    Acute pulmonary embolism ([facility] [address] 7/1/2019    Pulmonary emboli at the periphery of the right main pulmonary artery extending into the middle and lower lobe branches    Anticoagulated on warfarin 7/1/2019    Benign prostatic hyperplasia with lower urinary tract symptoms     Demand ischemia of myocardium ([facility] [address] 7/6/2019 4/16/18 NST EF 59% , small, reversible apical defect 7/2/19 ECHO EF 68%, LVH 14/16, LAE, RVE, ANDREY 1.4cm2 with 12mm mean gradient    Diverticulosis     Dyslipidemia     Essential tremor     Gastroesophageal reflux disease     History of pericarditis     Obesity, Class I, BMI 30-34.9     Paroxysmal atrial fibrillation ([address]     CVAL Maddy Saleh FNP 2008  AF ablation 9/11/13 EPS with isolation of RSPV and LIPV 6/4/18 TEDDY  EF 55%, 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 male recently admitted for femur fracturesent from skilled nursing facility for fever to 102.9. Patient was recently seen at [facility] for hip replacement status post fall. Patient endorses retrosternal chest pain that started upon waking this morning has been persistent since without propagating or palliating factors.   Patient also endorses increased bilateral lower extremity s    Sick sinus syndrome ([address]     Vitamin D deficiency 7/10/2019    Vitamin D 25-Hydroxy (7/10/2019) = 18.6       Patient taking anticoagulants yes Coumadin   Patient has a defibrillator: no     Assessment:   Changes in Assessment throughout shift: no     Patient has central line: no Reasons if yes: na  Insertion date:na Last dressing date:na   Patient has Truong Cath: no Reasons if yes: n/a   Insertion date:n/a     Last Vitals:     Vitals:    07/19/19 2138 07/20/19 0746 07/20/19 1621 07/20/19 2155   BP: 141/72 138/69 119/68 135/72   Pulse: 71 68 70 70   Resp: 18 17 18 20   Temp: 98.7 °F (37.1 °C) 98.4 °F (36.9 °C) 96.9 °F (36.1 °C) 98 °F (36.7 °C)   SpO2: 98% 97% 98% 97%   Weight:       Height:  PAIN    Pain Assessment    Pain Intensity 1: 5 (07/21/19 3744) Pain Intensity 1: 2 (12/29/14 1105)    Pain Location 1: Hip Pain Location 1: Abdomen    Pain Intervention(s) 1: Medication (see MAR) Pain Intervention(s) 1: Medication (see MAR)  Patient Stated Pain Goal: 0 Patient Stated Pain Goal: 0  o Intervention effective: yes    o Other actions taken for pain: no     Skin Assessment  Skin color Skin Color: Appropriate for ethnicity  Condition/Temperature Skin Condition/Temp: Warm  Integrity Skin Integrity: Incision (comment)  Turgor Turgor: Non-tenting  Weekly Pressure Ulcer Documentation  Pressure  Injury Documentation: No Pressure Injury Noted-Pressure Ulcer Prevention Initiated  Wound Prevention & Protection Methods  Orientation of wound Orientation of Wound Prevention: Posterior  Location of Prevention Location of Wound Prevention: Sacrum/Coccyx  Dressing Present Dressing Present : No  Dressing Status    Wound Offloading Wound Offloading (Prevention Methods): Bed, pressure redistribution/air     INTAKE/OUPUT  Date 07/20/19 0700 - 07/21/19 0659 07/21/19 0700 - 07/22/19 0659   Shift 9252-9244 6467-0795 24 Hour Total 8349-0342 4895-5092 24 Hour Total   INTAKE   P.O. 960  960        P. O. 960  960      Shift Total(mL/kg) 960(9.3)  960(9.3)      OUTPUT   Urine(mL/kg/hr)           Urine Occurrence(s) 4 x 2 x 6 x      Stool           Stool Occurrence(s) 1 x 0 x 1 x      Shift Total(mL/kg)           960      Weight (kg) 103.4 103.4 103.4 103.4 103.4 103.4       Recommendations:  1. Patient needs and requests: addressed    2. Diet: cardiac    3. Pending tests/procedures: none     4. Functional Level/Equipment: whellchair    5. Estimated Discharge Date: TBD Posted on Whiteboard in Patients Room: no     HEALS Safety Check    A safety check occurred in the patient's room between off going nurse and oncoming nurse listed above.     The safety check included the below items  Area Items   H  High Alert Medications - Verify all high alert medication drips (heparin, PCA, etc.)   E  Equipment - Suction is set up for ALL patients (with presley)  - Red plugs utilized for all equipment (IV pumps, etc.)  - WOWs wiped down at end of shift.  - Room stocked with oxygen, suction, and other unit-specific supplies   A  Alarms - Bed alarm is set for fall risk patients  - Ensure chair alarm is in place and activated if patient is up in a chair   L  Lines - Check IV for any infiltration  - Truong bag is empty if patient has a Truong   - Tubing and IV bags are labeled   S  Safety   - Room is clean, patient is clean, and equipment is clean. - Hallways are clear from equipment besides carts. - Fall bracelet on for fall risk patients  - Ensure room is clear and free of clutter  - Suction is set up for ALL patients (with presley)  - Hallways are clear from equipment besides carts.    - Isolation precautions followed, supplies available outside room, sign posted

## 2019-07-21 NOTE — PROGRESS NOTES
Problem: Mobility Impaired (Adult and Pediatric)  Goal: *Acute Goals and Plan of Care (Insert Text)  Description  Physical Therapy Goals  Short Term Goals = Long Term Goals  Initiated 7/10/2019 and to be accomplished within 2-3 weeks (2019-2019)  1. Patient will move from supine to sit and sit to supine , scoot up and down and roll side to side in bed with modified independence. 2.  Patient will transfer from bed to chair and chair to bed with close supervision/set-up using the least restrictive device. 3.  Patient will perform sit to stand with close supervision/set-up. 4.  Patient will ambulate with close supervision/set-up for 10 feet with RW maintaining NWB left LE. 5.  Patient will propel w/c over level surfaces greater than 150 feet and over entryways, thresholds, and ramps with modified independence. Outcome: Progressing Towards Goal   PHYSICAL THERAPY TREATMENT    Patient: Cesia Arnold (80 y.o. male)  Date: 2019  Diagnosis: Status post open reduction with internal fixation of fracture [Z96.7, Z87.81] Status post open reduction with internal fixation of fracture  Precautions:  fall risk, NWB and THP on left LE  Chart, physical therapy assessment, plan of care and goals were reviewed. Time In: 1205  Time Out: 1235  Patient Seen For: Transfer training; Wheelchair mobility; Therapeutic exercise(bed mobility)  Pain:  Pt pain was reported as  5/10 pre-treatment. Pt pain was reported as 5/10 post-treatment. Intervention: nurse admin pain meds at start of session. Patient identified with name and : yes     SUBJECTIVE:     Pt reports \"I just got finished doing my leg exercises on my own except the windshield wipers, I need help with those. \" But pt agreeable to PT.     OBJECTIVE DATA SUMMARY:   Objective:     BED/MAT MOBILITY Daily Assessment    Supine to Sit : 4 (Minimal assistance)  Sit to Supine : 4 (Minimal assistance)  Pt presents supine in bed and performed supine to sit on right side of bed with Edyta to left LE to clear EOB. Pt performed bed mobility on left side of mat table, requiring Edyta with left LE to lift and clear EOM. Pt performed supine to sit with only SBA and increased time to clear EOM with left LE.        TRANSFERS Daily Assessment    Transfer Type: Other  Other: stand step txfr with RW  Transfer Assistance : 4 (Contact guard assistance)  Sit to Stand Assistance: Contact guard assistance   Pt performed sit to stand with CGA for safety and good compliance of NWB left LE. Pt performed stand step txfr bed to w/c and w/c<->mat table with RW with CGA. Pt demo'd occasional pivoting on right foot to decrease hopping. WHEELCHAIR MOBILITY Daily Assessment     Pt propelled to and from gym with BUE Jh. THERAPEUTIC EXERCISES Daily Assessment     Supine BLE hip abd 3x10 with 4# on right LE           ASSESSMENT:  Pt tolerated treatment well. Progression toward goals:  x      Improving appropriately and progressing toward goals  ? Improving slowly and progressing toward goals  ? Not making progress toward goals and plan of care will be adjusted     PLAN:  Patient continues to benefit from skilled intervention to address the above impairments. Continue treatment per established plan of care. Discharge Recommendations:  Home Health  Further Equipment Recommendations for Discharge:  rolling walker(pt has), wheelchair 18 inch and slide board     Estimated LOS: 7/23/19    Activity Tolerance:   good  Please refer to the flowsheet for vital signs taken during this treatment. After treatment:   Patient left propelling w/c back to room on own power.        Murray Libman, PTA  7/21/2019

## 2019-07-22 LAB
ANION GAP SERPL CALC-SCNC: 5 MMOL/L (ref 3–18)
BUN SERPL-MCNC: 16 MG/DL (ref 7–18)
BUN/CREAT SERPL: 17 (ref 12–20)
CALCIUM SERPL-MCNC: 8.4 MG/DL (ref 8.5–10.1)
CHLORIDE SERPL-SCNC: 104 MMOL/L (ref 100–111)
CO2 SERPL-SCNC: 30 MMOL/L (ref 21–32)
CREAT SERPL-MCNC: 0.94 MG/DL (ref 0.6–1.3)
GLUCOSE SERPL-MCNC: 94 MG/DL (ref 74–99)
HCT VFR BLD AUTO: 31.4 % (ref 36–48)
HGB BLD-MCNC: 9.6 G/DL (ref 13–16)
INR PPP: 2.2 (ref 0.8–1.2)
POTASSIUM SERPL-SCNC: 4.5 MMOL/L (ref 3.5–5.5)
PROTHROMBIN TIME: 24.5 SEC (ref 11.5–15.2)
SODIUM SERPL-SCNC: 139 MMOL/L (ref 136–145)

## 2019-07-22 PROCEDURE — 74011250637 HC RX REV CODE- 250/637: Performed by: INTERNAL MEDICINE

## 2019-07-22 PROCEDURE — 97110 THERAPEUTIC EXERCISES: CPT

## 2019-07-22 PROCEDURE — 36415 COLL VENOUS BLD VENIPUNCTURE: CPT

## 2019-07-22 PROCEDURE — 85610 PROTHROMBIN TIME: CPT

## 2019-07-22 PROCEDURE — 80048 BASIC METABOLIC PNL TOTAL CA: CPT

## 2019-07-22 PROCEDURE — 85018 HEMOGLOBIN: CPT

## 2019-07-22 PROCEDURE — 65310000000 HC RM PRIVATE REHAB

## 2019-07-22 PROCEDURE — 97535 SELF CARE MNGMENT TRAINING: CPT

## 2019-07-22 PROCEDURE — 97530 THERAPEUTIC ACTIVITIES: CPT

## 2019-07-22 RX ORDER — CHOLECALCIFEROL (VITAMIN D3) 125 MCG
5000 CAPSULE ORAL DAILY
Qty: 30 CAP | Refills: 0 | Status: SHIPPED | OUTPATIENT
Start: 2019-07-23

## 2019-07-22 RX ORDER — METHOCARBAMOL 500 MG/1
500 TABLET, FILM COATED ORAL EVERY 8 HOURS
Qty: 90 TAB | Refills: 0 | Status: SHIPPED | OUTPATIENT
Start: 2019-07-22

## 2019-07-22 RX ORDER — ASCORBIC ACID 250 MG
250 TABLET ORAL
Qty: 30 TAB | Refills: 0 | Status: SHIPPED | OUTPATIENT
Start: 2019-07-23

## 2019-07-22 RX ORDER — WARFARIN SODIUM 5 MG/1
5 TABLET ORAL
Qty: 10 TAB | Refills: 0 | Status: SHIPPED | OUTPATIENT
Start: 2019-07-22

## 2019-07-22 RX ORDER — DICLOFENAC EPOLAMINE 0.01 G/1
1 PATCH TOPICAL EVERY 12 HOURS
Qty: 30 PATCH | Refills: 0 | Status: SHIPPED | OUTPATIENT
Start: 2019-07-22 | End: 2019-08-06

## 2019-07-22 RX ORDER — LANOLIN ALCOHOL/MO/W.PET/CERES
325 CREAM (GRAM) TOPICAL
Qty: 30 TAB | Refills: 0 | Status: SHIPPED | OUTPATIENT
Start: 2019-07-23

## 2019-07-22 RX ORDER — ACETAMINOPHEN 325 MG/1
650 TABLET ORAL
Qty: 60 TAB | Refills: 0 | Status: SHIPPED | OUTPATIENT
Start: 2019-07-22

## 2019-07-22 RX ORDER — WARFARIN 3 MG/1
6 TABLET ORAL ONCE
Status: COMPLETED | OUTPATIENT
Start: 2019-07-22 | End: 2019-07-22

## 2019-07-22 RX ORDER — DOCUSATE SODIUM 100 MG/1
100 CAPSULE, LIQUID FILLED ORAL 2 TIMES DAILY
Status: DISCONTINUED | OUTPATIENT
Start: 2019-07-23 | End: 2019-07-23 | Stop reason: HOSPADM

## 2019-07-22 RX ORDER — HYDROCODONE BITARTRATE AND ACETAMINOPHEN 5; 325 MG/1; MG/1
1 TABLET ORAL
Qty: 28 TAB | Refills: 0 | Status: SHIPPED | OUTPATIENT
Start: 2019-07-22 | End: 2019-07-29

## 2019-07-22 RX ORDER — FACIAL-BODY WIPES
10 EACH TOPICAL ONCE
Status: COMPLETED | OUTPATIENT
Start: 2019-07-22 | End: 2019-07-22

## 2019-07-22 RX ORDER — DOCUSATE SODIUM 100 MG/1
100 CAPSULE, LIQUID FILLED ORAL 2 TIMES DAILY
Qty: 60 CAP | Refills: 0 | Status: SHIPPED | OUTPATIENT
Start: 2019-07-23

## 2019-07-22 RX ORDER — IBUPROFEN 200 MG
200 CAPSULE ORAL 2 TIMES DAILY
Qty: 60 TAB | Refills: 0 | Status: SHIPPED | OUTPATIENT
Start: 2019-07-23

## 2019-07-22 RX ORDER — WARFARIN 4 MG/1
4 TABLET ORAL
Qty: 10 TAB | Refills: 0 | Status: SHIPPED | OUTPATIENT
Start: 2019-07-23

## 2019-07-22 RX ADMIN — METHOCARBAMOL TABLETS 500 MG: 500 TABLET, COATED ORAL at 14:03

## 2019-07-22 RX ADMIN — METHOCARBAMOL TABLETS 500 MG: 500 TABLET, COATED ORAL at 21:33

## 2019-07-22 RX ADMIN — HYDROCODONE BITARTRATE AND ACETAMINOPHEN 1 TABLET: 5; 325 TABLET ORAL at 13:09

## 2019-07-22 RX ADMIN — Medication 10 MG: at 15:40

## 2019-07-22 RX ADMIN — BISACODYL 10 MG: 5 TABLET, COATED ORAL at 17:51

## 2019-07-22 RX ADMIN — FERROUS SULFATE TAB 325 MG (65 MG ELEMENTAL FE) 325 MG: 325 (65 FE) TAB at 08:31

## 2019-07-22 RX ADMIN — SIMVASTATIN 10 MG: 10 TABLET, FILM COATED ORAL at 21:33

## 2019-07-22 RX ADMIN — DOCUSATE SODIUM 100 MG: 100 CAPSULE, LIQUID FILLED ORAL at 09:14

## 2019-07-22 RX ADMIN — CALCIUM CITRATE 200 MG (950 MG) TABLET 200 MG: at 17:49

## 2019-07-22 RX ADMIN — WARFARIN SODIUM 6 MG: 3 TABLET ORAL at 17:49

## 2019-07-22 RX ADMIN — Medication 5000 UNITS: at 08:32

## 2019-07-22 RX ADMIN — HYDROCODONE BITARTRATE AND ACETAMINOPHEN 1 TABLET: 5; 325 TABLET ORAL at 08:32

## 2019-07-22 RX ADMIN — CALCIUM CITRATE 200 MG (950 MG) TABLET 200 MG: at 08:31

## 2019-07-22 RX ADMIN — HYDROCODONE BITARTRATE AND ACETAMINOPHEN 1 TABLET: 5; 325 TABLET ORAL at 04:17

## 2019-07-22 RX ADMIN — METHOCARBAMOL TABLETS 500 MG: 500 TABLET, COATED ORAL at 06:25

## 2019-07-22 RX ADMIN — Medication 250 MG: at 08:31

## 2019-07-22 NOTE — FACE TO FACE
[address]  [address] [facility], [address]    [address] [facility]    Name: Fausto Mcelroy. Age / Sex: 80 y.o. / male   CSN: [id number] MRN: [id number]   Admit Date: 7/9/2019 Discharge Date: 7/23/2019     Primary Care Provider: Mike García MD      Primary Rehabilitation Diagnosis  1. Impaired Mobility and ADLs  2. S/P Open reduction internal fixation of displaced subtrochanteric periprosthetic fracture around internal prosthetic left hip joint (6/25/2019 - Dr. Mike Diallo)  3. History of displaced subtrochanteric periprosthetic fracture of left femur around internal prosthetic left hip joint; S/P Left total hip replacement (2/18/2019 - Dr. 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    Mild aortic stenosis I35.0    Acute blood loss as cause of postoperative anemia D62    Acute pulmonary embolism  I26.99    Anticoagulated on warfarin Z79.01    Primary osteoarthritis involving multiple joints M15.0    Gastroesophageal reflux disease K21.9    Dyslipidemia E78.5    Diverticulosis K57.90    History of deep vein thrombosis of lower extremity Z86.718    Sick sinus syndrome  I49.5    Benign prostatic hyperplasia with lower urinary tract symptoms N40.1    Tremor R25.1    Obesity, Class I, BMI 30-34.9 E66.9    Vitamin D deficiency E55.9    Essential tremor G25.0    History of pericarditis Z86.79        History of the Present Illness:  The patient is an 43-year-old White male with multiple medical comorbidities who was admitted to [facility] on 7/5/2019 due to fever.      On 2/18/2019, patient underwent a left total hip replacement done by Dr. Mike Diallo.     On 6/24/2019, while working in [facility], the patient had a fall and landed on his left hip. He denied any loss of consciousness. He was unable to stand up and ambulate due to severe left hip pain. The patient was brought to the [facility] Emergency Department for further evaluation. The patient was seen and examined by Emergency Medicine (Dr. Johnathon Xavier).     Excerpt (Additional History) from the ED Provider Note by Dr. Johnathon Xavier:  \"Alessandro Mercado Jr. is a 80 y. o. male with Previous left hip replacement by Dr. Milton Miguel in [address] [place] [address] presents with a mechanical fall or working in his yard, turned and twisted and lost his footing and fell onto his left side.  Had immediate left hip pain and inability to bear weight. Taylor Lincoln has some mild pain in his left knee.  No other injuries or complaints.  No loss of consciousness, head trauma, neck pain, nausea vomiting diarrhea, abdominal pain. \"     X-ray of the left femur (6/24/2019) showed an offset angulated fracture through the subtrochanteric left femur involving the femoral stem of the total left hip arthroplasty.     X-ray of bilateral hips (6/24/2019) showed a distracted and angulated acute fracture of the proximal femoral diaphysis extending to the intramedullary stem of the femoral component of the left hip replacement; negative right hip.     X-ray of the left knee (6/24/2019) showed no fracture nor dislocation.     Hgb/Hct (6/24/2019) = 12.2/35. 3      The patient was transferred to THE [facility] and was admitted under the service of Orthopedics (Dr. Lisa Hurtado).    Excerpt (History of the Present Illness) from the H&P by Dr. Lisa Hurtado:  \"A very pleasant 77-year-old  gentleman who was walking on a hard surface, reached over to  an object, lost his balance immediately and fell directly onto the left hip. Transported to [facility]. ER evaluation identified a fracture.  Emergency room doctor contacted the [facility] for arrangement of transfer. Index primary left total hip performed by Dr. Bruner on February, 2019. History of left total knee done elsewhere, right total knee done at this facility as well according to the patient. He was evaluated in his hospital bed reporting fairly significant left thigh pain. Prior to the fall, doing extremely well. He takes a baby aspirin. Actually off Plavix after having an ablation and a watchman device in 2018. Pacemaker present. Has been n.p.o. since midnight. Allergies include Meperidine, adhesive glutens, milk, ACE inhibitors. No history of PE or DVT. \"     On 6/25/2019, the patient underwent an Open reduction internal fixation of displaced subtrochanteric periprosthetic fracture around internal prosthetic left hip joint done by Dr. Scot Bearden. The patient tolerated the procedure well with no intraoperative complications. The patient developed acute postoperative blood loss anemia and the patient was transfused a total of 5 units pRBC.     On 7/1/2019, CT angiogram of the abdomen and pelvis showed pulmonary emboli at the periphery of the right main pulmonary artery extending into the middle and lower lobe branches. Patient was started on anticoagulation with Warfarin.     As per Orthopedics, the patient is to be toe touch weight bearing on the left lower extremity.     On 7/5/2019, the patient was discharged / transferred to a skilled nursing facility ([address] [place] [address]).    -     On 7/5/2019, after ~4 hours at the skilled nursing facility ([address] [place] [address]), patient was noted to have a fever. The patient was brought to the [facility] Emergency Department for further evaluation. The patient was seen and examined by Emergency Medicine (Dr. Adriana Ho).     Excerpt (Additional History) from the ED Provider Note by Dr. Cristi Randhawa:  \"89 y. o. male recently admitted for femur fracturesent from skilled nursing facility for fever to 102.9.  Patient was recently seen at [facility] for hip replacement status post fall.  Patient endorses retrosternal chest pain that started upon waking this morning has been persistent since without propagating or palliating factors.  Patient also endorses increased bilateral lower extremity swelling and difficulty breathing.  She has no sequela of occult sickness denies abdominal pain nausea vomiting diarrhea or symptoms consistent with upper respiratory infection.  He did have a Truong placed during his last hospitalization, removed 7/4 per records. [facility] course was complicated by a PE, he is currently on anticoagulation. \"     X-ray of the chest x-ray (7/5/2019) showed mild pulmonary edema with small bilateral pleural effusions.     CT scan of the left lower extremity (7/5/2019) showed left total hip arthroplasty with a periprosthetic fracture status post plate and screw fixation and cerclage wire placement; heterogeneous collection posterior to the greater trochanter, most consistent with a postoperative hematoma; no rim enhancement to suggest abscess, though early superinfection of the hematoma cannot be excluded.     WBC count (7/5/2019) = 18.6     Hgb/Hct (7/5/2019) = 9.3/29.2     Patient was empirically started on Piperacillin-Tazobactam IV 4.5 grams IV q 6 hr and Vancomycin IV (one dose only).    The patient was admitted under the service of the [address] [place] [address] (Dr. Goran Vinson).     Excerpt (History of the Present Illness) from the H&P by Dr. Goran Vinson:  \"Alessandro Jin Calloway a 80 y. o. male with hx of HLD, A Fib, SSS s/p pacemaker, Anemia, and L hip ORIF for periprosthetic femur fracture who presents to the ED on transfer [address] for evaluation of fever reported as 102.9.  He also complains of having symptoms of chest pain which began upon awakening in the a.m. [facility] is status post ORIF left hip for periprosthetic femur fracture on 6/25/2019 by Dr. Bruner of orthopedic surgery. [facility] hospital stay was complicated by a pulmonary embolism, and he has been placed on Coumadin therapy for this. Tati Park was discharged from [facility] earlier in the day on 7/5/2019, and his wife states that he developed a fever shortly after arrival to the skilled nursing facility. Tati Park denies any nausea, vomiting, abdominal pain, or diarrhea.  He has not had any associated shortness of breath.  He does have some lower extremity swelling which has been chronic.  He received 5 units PRBCs during his hospital stay for postop anemia felt to be due to acute blood loss. \"     WBC count (7/6/2019) = 29.2     Hgb/Hct (7/6/2019) = 8.4/26. 6     Cardiology consult (Dr. Greyson Napier) was called for evaluation and comanagement of elevated troponin level. The elevated troponin level was attributed to demand ischemia due to sepsis.     Orthopedics consult GILBERT Hopkins) was called for evaluation and comanagement.     Excerpt (Assessment and Plan) from the Consult Note by GILBERT Hopkins:  [employer]:              Sepsis              Benign appearing left hip and femur now 11 days s/p ORIF periprosthetic hip fracture with Dr. Bindu Cordova [facility])     Plan:                 Dee Baeza provided that hip does not appear to be involved.  If surgery ever becomes necessary, patient would request to transfer to [facility] for Dr. Bruner to treat. Joy Reilly would agree with that but it does not appear that it will be necessary based on today's exam.  NWB LLE.  Ortho to sign-off                 Patient d/w Dr. Shelton and he agrees with the impression and plan of care. \"      Warfarin was continued during the patient's hospital stay.     WBC count (7/7/2019) = 18.9     Hgb/Hct (7/7/2019) = 8.0/25. 2     WBC count (7/8/2019) = 9.8     Hgb/Hct (7/8/2019) = 7.9/25. 2     Blood culture (7/5/2019; 1 out of 2 sets) yielded growth of Pseudomonas aeruginosa (PANSENSITIVE)     Nuclear medicine cardiac single-photon emission computerized tomography (SPECT) stress test (7/8/2019) showed no evidence of reversible myocardial ischemia; this is a low risk study.     On 7/8/2019, patient received Warfarin 3 mg PO.     INR (7/9/2019) = 2.9     On 7/9/2019, Piperacillin-Tazobactam IV was discontinued and patient was discharged from [facility] on oral Ciprofloxacin 500 mg PO q 12 hr x 4 days.     The patient had remained hemodynamically stable but due to the above events, the patient was noted to be generally weak and with impaired mobility and ADLs. Patient was felt to be a good candidate for acute inpatient rehabilitation. Upon evaluation by Physical Therapy and Occupational Therapy, the patient was recommended for acute inpatient rehabilitation. The patient was discharged and was subsequently admitted to the [facility] for intensive rehabilitation to help recover strength, function and mobility.       Past Medical History:  Past Medical History:   Diagnosis Date    Acute blood loss as cause of postoperative anemia 6/25/2019    Acute pulmonary embolism ([address] 7/1/2019    Pulmonary emboli at the periphery of the right main pulmonary artery extending into the middle and lower lobe branches    Anticoagulated on warfarin 7/1/2019    Benign prostatic hyperplasia with lower urinary tract symptoms     Demand ischemia of myocardium ([address] 7/6/2019 4/16/18 NST EF 59% , small, reversible apical defect 7/2/19 ECHO EF 68%, LVH 14/16, LAE, RVE, ANDREY 1.4cm2 with 12mm mean gradient    Diverticulosis     Dyslipidemia     Essential tremor     Gastroesophageal reflux disease     History of pericarditis     Obesity, Class I, BMI 30-34.9     Paroxysmal atrial fibrillation ([address]     CVAL Maddy Saleh FNP 2008  AF ablation 9/11/13 EPS with isolation of RSPV and LIPV 6/4/18 TEDDY  EF 55%, 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 male recently admitted for femur fracturesent from skilled nursing facility for fever to 102.9. Patient was recently seen at [facility] for hip replacement status post fall. Patient endorses retrosternal chest pain that started upon waking this morning has been persistent since without propagating or palliating factors. Patient also endorses increased bilateral lower extremity s    Sick sinus syndrome ([facility]     Vitamin D deficiency 7/10/2019    Vitamin D 25-Hydroxy (7/10/2019) = 18.6        Past Surgical History:  Past Surgical History:   Procedure Laterality Date    HX AFIB ABLATION      2x    HX CATARACT REMOVAL Bilateral     HX CHOLECYSTECTOMY      HX CRANIOTOMY  2015    Subdural hematoma    HX CRANIOTOMY  2016    Subdural hematoma    HX HIP FRACTURE TX Left 06/25/2019    S/P Open reduction internal fixation of displaced subtrochanteric periprosthetic fracture around internal prosthetic left hip joint (6/25/2019 - Dr. Lulu Wang)    HX HIP REPLACEMENT Left 02/18/2019    S/P Left total hip replacement (2/18/2019 - Dr. Lulu Wang)    HX KNEE REPLACEMENT Right 2002    HX KNEE REPLACEMENT Left 2000    HX ORTHOPAEDIC      S/P Surgery of L4L5    HX OTHER SURGICAL      S/P Placement of Watchman device for atrial fibrillation    HX PACEMAKER  2010    HX SHOULDER ARTHROSCOPY Bilateral     HX VEIN STRIPPING Left     1980's    SD ANALYZ NEUROSTIM BRAIN, EACH ADD 27 MIN      brain surgery 2015       Medications on Discharge:    Current Discharge Medication List      START taking these medications    Details   methocarbamol (ROBAXIN) 500 mg tablet Take 1 Tab by mouth every eight (8) hours.  Indications: muscle spasm  Qty: 90 Tab, Refills: 0    Associated Diagnoses: Closed displaced subtrochanteric fracture of left femur with routine healing, subsequent encounter; Status post open reduction with internal fixation of fracture; Acute pulmonary embolism without acute cor pulmonale, unspecified pulmonary embolism type (HCC)      HYDROcodone-acetaminophen (NORCO) 5-325 mg per tablet Take 1 Tab by mouth every four (4) hours as needed (for pain level greater than 4/10) for up to 7 days. Max Daily Amount: 6 Tabs. Indications: Pain  Qty: 28 Tab, Refills: 0    Associated Diagnoses: Periprosthetic fracture around internal prosthetic left hip joint, subsequent encounter; Closed displaced subtrochanteric fracture of left femur with routine healing, subsequent encounter; Status post open reduction with internal fixation of fracture      ferrous sulfate 325 mg (65 mg iron) tablet Take 1 Tab by mouth daily (with breakfast). Qty: 30 Tab, Refills: 0    Associated Diagnoses: Acute blood loss as cause of postoperative anemia      docusate sodium (COLACE) 100 mg capsule Take 1 Cap by mouth two (2) times a day. Indications: constipation  Qty: 60 Cap, Refills: 0      diclofenac (FLECTOR) 1.3 % pt12 1 Patch by TransDERmal route every twelve (12) hours for 15 days. Qty: 30 Patch, Refills: 0    Associated Diagnoses: Periprosthetic fracture around internal prosthetic left hip joint, subsequent encounter; Closed displaced subtrochanteric fracture of left femur with routine healing, subsequent encounter; Status post open reduction with internal fixation of fracture      cholecalciferol (VITAMIN D3) 5,000 unit capsule Take 1 Cap by mouth daily. Indications: vitamin D deficiency  Qty: 30 Cap, Refills: 0    Associated Diagnoses: Vitamin D deficiency      calcium citrate 200 mg (950 mg) tablet Take 1 Tab by mouth two (2) times a day.   Qty: 60 Tab, Refills: 0    Associated Diagnoses: Closed displaced subtrochanteric fracture of left femur with routine healing, subsequent encounter; Status post open reduction with internal fixation of fracture      ascorbic acid, vitamin C, (VITAMIN C) 250 mg tablet Take 1 Tab by mouth daily (with breakfast). Qty: 30 Tab, Refills: 0    Associated Diagnoses: Acute blood loss as cause of postoperative anemia      acetaminophen (TYLENOL) 325 mg tablet Take 2 Tabs by mouth every four (4) hours as needed (for fever or pain level less than 5/10). Indications: fever, Pain  Qty: 60 Tab, Refills: 0    Associated Diagnoses: Periprosthetic fracture around internal prosthetic left hip joint, subsequent encounter; Closed displaced subtrochanteric fracture of left femur with routine healing, subsequent encounter; Status post open reduction with internal fixation of fracture      !! warfarin (COUMADIN) 4 mg tablet Take 1 Tab by mouth every Tuesday, Thursday, Saturday & Sunday. at bedtime. [Dose to be adjusted by Dr. Leah Davis with target INR 2 to 3]  Qty: 10 Tab, Refills: 0    Associated Diagnoses: Acute pulmonary embolism without acute cor pulmonale, unspecified pulmonary embolism type ([facility] Anticoagulated on warfarin      !! warfarin (COUMADIN) 5 mg tablet Take 1 Tab by mouth every Monday, Wednesday, Friday. at bedtime. [Dose to be adjusted by Dr. Leah Davis with target INR 2 to 3]  Qty: 10 Tab, Refills: 0    Associated Diagnoses: Acute pulmonary embolism without acute cor pulmonale, unspecified pulmonary embolism type ([address] Anticoagulated on warfarin       !! - Potential duplicate medications found. Please discuss with provider. CONTINUE these medications which have NOT CHANGED    Details   simvastatin (ZOCOR) 10 mg tablet Take 10 mg by mouth nightly. STOP taking these medications       aspirin delayed-release 81 mg tablet Comments:   Reason for Stopping:               Condition on Discharge: Stable.     Ambulation Gait  Amount of Assistance: 4 (Contact guard assistance)  Distance (ft): 16 Feet (ft)(x 2 trials)  Assistive Device: Walker, rolling     Wheelchair Mobility Wheelchair Mobility/Management  Able to Propel (ft): 250 feet  Functional Level: 6  Curbs/Ramps Assist Required (FIM Score): 5 (Supervision)  Wheelchair Setup Assist Required : 3 (Moderate assistance)  Wheelchair Management: Manages left brake, Manages right brake, Manages right footrest         Disposition: Patient clinically improved and was discharged to home with home health physical therapy, occupational therapy and skilled nursing. The patient is temporarily homebound secondary to functional deficits after undergoing an Open reduction internal fixation of displaced subtrochanteric periprosthetic fracture around internal prosthetic left hip joint (6/25/2019 - Dr. Rashid Carlton); History of displaced subtrochanteric periprosthetic fracture of left femur around internal prosthetic left hip joint; S/P Left total hip replacement (2/18/2019 - Dr. Rashid Carlton). He can ambulate using a rolling walker (see above). The patient would benefit from continued skilled physical therapy in order to improve independent functional mobility within the home with use of least restrictive device. The patient would also benefit from continued skilled occupational therapy in order to improve self care and functional mobility within the home with use of least restrictive device. Short-term skilled nursing is needed for medication reconciliation, disease education and PT/INR monitoring. PT/INR monitoring post-discharge care of PCP (Dr. Dannie Dewitt). Next PT/INR to be done on 7/24/2019 with results sent to the office of Dr. Dannie Dewitt. Due to the abovementioned data, I certify that the patient needs intermittent Skilled Nursing, Physical Therapy and Occupational Therapy. I will NOT be following this patient in the Community and Dr. Dannie Dewitt will be responsible for signing the [address] of Care.     In compliance with the Affordable Care Act, I certify that this patient was managed by me during this hospitalization and that I had a Face-to-Face Encounter that meets the physician Face-to-Face Encounter requirements.       Signed:    Manoj Ruiz MD    July 22, 2019

## 2019-07-22 NOTE — PROGRESS NOTES
SHIFT CHANGE NOTE FOR [facility]    Bedside and Verbal shift change report Nannette Ga RN (oncoming nurse) by Fany Dozier, RN (offgoing nurse). Report included the following information SBAR, Kardex, MAR and Recent Results. Situation:   Code Status: Full Code   Reason for Admission: S/P ORIF  Hospital Day: 13   Problem List:   Hospital Problems  Date Reviewed: 7/21/2019          Codes Class Noted POA    Increased urinary frequency (Chronic) ICD-10-CM: R35.0  ICD-9-CM: 788.41  7/16/2019 Yes        Vitamin D deficiency (Chronic) ICD-10-CM: E55.9  ICD-9-CM: 268.9  7/10/2019 Yes    Overview Signed 7/10/2019 11:23 AM by Angela Strauss MD     Vitamin D 25-Hydroxy (7/10/2019) = 18.6              Sepsis due to Pseudomonas species [facility]) ICD-10-CM: A41.52  ICD-9-CM: 038.43, 995.91  7/5/2019 Yes    Overview Addendum 7/6/2019  8:16 PM by Brooks Urena MD     7/5/19      80 y.o. male recently admitted for femur fracturesent from skilled nursing facility for fever to 102.9. Patient was recently seen at [facility] for hip replacement status post fall. Patient endorses retrosternal chest pain that started upon waking this morning has been persistent since without propagating or palliating factors. Patient also endorses increased bilateral lower extremity swelling and difficulty breathing. WBC 29.2  P-87%,  U/A neg  CT  Heterogeneous collection posterior to the greater trochanter, most consistent with a postoperative hematoma. No rim enhancement to suggest abscess  CXR Small bilateral pleural effusions, left greater than right. Mild pulmonary edema.  No consolidation  7/6/19 ORTHO (Luis Felipe Bolanos) Benign appearing left hip and femur now 11 days s/p ORIF periprosthetic hip fracture                         Impaired mobility and ADLs ICD-10-CM: Z74.09  ICD-9-CM: 799.89  7/5/2019 Yes        Acute pulmonary embolism ([address] ICD-10-CM: I26.99  ICD-9-CM: 415.19  7/1/2019 Yes    Overview Signed 7/9/2019  5:07 PM by Angela Strauss MD Pulmonary emboli at the periphery of the right main pulmonary artery extending into the middle and lower lobe branches             Anticoagulated on warfarin (Chronic) ICD-10-CM: Z79.01  ICD-9-CM: V58.61  7/1/2019 Yes        * (Principal) Status post open reduction with internal fixation of fracture ICD-10-CM: Z96.7, Z87.81  ICD-9-CM: V45.89, V15.51  6/25/2019 Yes    Overview Signed 7/9/2019  4:52 PM by Sherry North MD     S/P Open reduction internal fixation of displaced subtrochanteric periprosthetic fracture around internal prosthetic left hip joint (6/25/2019 - Dr. Cristal Shelby)             Acute blood loss as cause of postoperative anemia ICD-10-CM: D62  ICD-9-CM: 285.1  6/25/2019 Yes        Periprosthetic fracture around internal prosthetic left hip joint ([facility] [address] ICD-10-CM: M97. 02XA  ICD-9-CM: 996.44, V43.64  6/24/2019 Yes    Overview Signed 7/9/2019  5:04 PM by Sherry North MD     S/P Open reduction internal fixation of displaced subtrochanteric periprosthetic fracture around internal prosthetic left hip joint (6/25/2019 - Dr. Cristal Shelby)             Displaced subtrochanteric fracture of left femur [facility]) ICD-10-CM: H88.76BW  ICD-9-CM: 820.22  6/24/2019 Yes    Overview Signed 7/9/2019  5:02 PM by Sherry North MD     S/P Open reduction internal fixation of displaced subtrochanteric periprosthetic fracture around internal prosthetic left hip joint (6/25/2019 - Dr. Cristal Shelby)             History of total left hip arthroplasty ICD-10-CM: D34.525  ICD-9-CM: V43.64  2/18/2019 Yes    Overview Addendum 7/9/2019  5:01 PM by Sherry North MD     S/P Left total hip replacement (2/18/2019 - Dr. Cristal Shelby)                   Background:   Past Medical History:   Past Medical History:   Diagnosis Date    Acute blood loss as cause of postoperative anemia 6/25/2019    Acute pulmonary embolism ([address] 7/1/2019    Pulmonary emboli at the periphery of the right main pulmonary artery extending into the middle and lower lobe branches    Anticoagulated on warfarin 7/1/2019    Benign prostatic hyperplasia with lower urinary tract symptoms     Demand ischemia of myocardium ([address] 7/6/2019 4/16/18 NST EF 59% , small, reversible apical defect 7/2/19 ECHO EF 68%, LVH 14/16, LAE, RVE, ANDREY 1.4cm2 with 12mm mean gradient    Diverticulosis     Dyslipidemia     Essential tremor     Gastroesophageal reflux disease     History of pericarditis     Obesity, Class I, BMI 30-34.9     Paroxysmal atrial fibrillation ([address]     CVAL Maddy Saleh FNP 2008  AF ablation 9/11/13 EPS with isolation of RSPV and LIPV 6/4/18 TEDDY  EF 55%, 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 male recently admitted for femur fracturesent from skilled nursing facility for fever to 102.9. Patient was recently seen at [facility] for hip replacement status post fall. Patient endorses retrosternal chest pain that started upon waking this morning has been persistent since without propagating or palliating factors.   Patient also endorses increased bilateral lower extremity s    Sick sinus syndrome ([address]     Vitamin D deficiency 7/10/2019    Vitamin D 25-Hydroxy (7/10/2019) = 18.6       Patient taking anticoagulants yes Coumadin   Patient has a defibrillator: no     Assessment:   Changes in Assessment throughout shift: no     Patient has central line: no Reasons if yes: na  Insertion date:na Last dressing date:na   Patient has Truong Cath: no Reasons if yes: n/a   Insertion date:n/a     Last Vitals:     Vitals:    07/20/19 2155 07/21/19 0728 07/21/19 1552 07/21/19 2100   BP: 135/72 119/76 134/67 128/62   Pulse: 70 63 72 70   Resp: 20 17 16 17   Temp: 98 °F (36.7 °C) 98.1 °F (36.7 °C) 98.4 °F (36.9 °C) 98 °F (36.7 °C)   SpO2: 97% 97% 97% 99%   Weight:       Height:  PAIN    Pain Assessment    Pain Intensity 1: 2 (07/22/19 0505) Pain Intensity 1: 2 (12/29/14 1105)    Pain Location 1: Hip, Incisional Pain Location 1: Abdomen    Pain Intervention(s) 1: Medication (see MAR) Pain Intervention(s) 1: Medication (see MAR)  Patient Stated Pain Goal: 0 Patient Stated Pain Goal: 0  o Intervention effective: yes    o Other actions taken for pain: no     Skin Assessment  Skin color Skin Color: Appropriate for ethnicity  Condition/Temperature Skin Condition/Temp: Dry, Warm  Integrity Skin Integrity: Incision (comment)  Turgor Turgor: Non-tenting  Weekly Pressure Ulcer Documentation  Pressure  Injury Documentation: No Pressure Injury Noted-Pressure Ulcer Prevention Initiated  Wound Prevention & Protection Methods  Orientation of wound Orientation of Wound Prevention: Posterior  Location of Prevention Location of Wound Prevention: Buttocks, Sacrum/Coccyx  Dressing Present Dressing Present : No  Dressing Status    Wound Offloading Wound Offloading (Prevention Methods): Bed, pressure redistribution/air, Repositioning     INTAKE/OUPUT  Date 07/21/19 0700 - 07/22/19 0659 07/22/19 0700 - 07/23/19 0659   Shift 2604-0396 4847-0815 24 Hour Total 1307-6162 2446-9368 24 Hour Total   INTAKE   P.O. 4223 514 0710        P. O. 2852 288 3607      Shift Total(mL/kg) 1560(15.1) 360(3.5) 1920(18.6)      OUTPUT   Urine(mL/kg/hr)  1760(1.4) 1760(0.7)        Urine Voided  1760 1760        Urine Occurrence(s) 4 x  4 x      Emesis/NG output  0 0        Emesis  0 0        Emesis Occurrence(s) 0 x  0 x      Stool  0 0        Stool Occurrence(s) 0 x  0 x        Stool  0 0      Shift Total(mL/kg)  7582(03) 1885(38)      NET 1560 -1400 160      Weight (kg) 103.4 103.4 103.4 103.4 103.4 103.4       Recommendations:  1. Patient needs and requests: addressed    2. Diet: cardiac    3. Pending tests/procedures: none     4. Functional Level/Equipment: whellchair    5.  Estimated Discharge Date: TBD Posted on Whiteboard in Patients Room: no     [facility]    A safety check occurred in the patient's room between off going nurse and oncoming nurse listed above. The safety check included the below items  Area Items   H  High Alert Medications - Verify all high alert medication drips (heparin, PCA, etc.)   E  Equipment - Suction is set up for ALL patients (with presley)  - Red plugs utilized for all equipment (IV pumps, etc.)  - WOWs wiped down at end of shift.  - Room stocked with oxygen, suction, and other unit-specific supplies   A  Alarms - Bed alarm is set for fall risk patients  - Ensure chair alarm is in place and activated if patient is up in a chair   L  Lines - Check IV for any infiltration  - Truong bag is empty if patient has a Truong   - Tubing and IV bags are labeled   S  Safety   - Room is clean, patient is clean, and equipment is clean. - Hallways are clear from equipment besides carts. - Fall bracelet on for fall risk patients  - Ensure room is clear and free of clutter  - Suction is set up for ALL patients (with presley)  - Hallways are clear from equipment besides carts.    - Isolation precautions followed, supplies available outside room, sign posted

## 2019-07-22 NOTE — PROGRESS NOTES
SHIFT CHANGE NOTE FOR [facility]    Bedside and Verbal shift change report  Deborah Amor RN (oncoming nurse) by Jody Mendieta RN (offgoing nurse). Report included the following information SBAR, Kardex, MAR and Recent Results. Situation:   Code Status: Full Code   Reason for Admission: S/P ORIF  Hospital Day: 13   Problem List:   Hospital Problems  Date Reviewed: 7/22/2019          Codes Class Noted POA    Increased urinary frequency (Chronic) ICD-10-CM: R35.0  ICD-9-CM: 788.41  7/16/2019 Yes        Vitamin D deficiency (Chronic) ICD-10-CM: E55.9  ICD-9-CM: 268.9  7/10/2019 Yes    Overview Signed 7/10/2019 11:23 AM by Charissa Emmanuel MD     Vitamin D 25-Hydroxy (7/10/2019) = 18.6              Sepsis due to Pseudomonas species [facility]) ICD-10-CM: A41.52  ICD-9-CM: 038.43, 995.91  7/5/2019 Yes    Overview Addendum 7/6/2019  8:16 PM by Nas Mendez MD     7/5/19      80 y.o. male recently admitted for femur fracturesent from skilled nursing [facility] for fever to 102.9. Patient was recently seen at [facility] for hip replacement status post fall. Patient endorses retrosternal chest pain that started upon waking this morning has been persistent since without propagating or palliating factors. Patient also endorses increased bilateral lower extremity swelling and difficulty breathing. WBC 29.2  P-87%,  U/A neg  CT  Heterogeneous collection posterior to the greater trochanter, most consistent with a postoperative hematoma. No rim enhancement to suggest abscess  CXR Small bilateral pleural effusions, left greater than right. Mild pulmonary edema.  No consolidation  7/6/19 ORTHO Kiley Montano) Benign appearing left hip and femur now 11 days s/p ORIF periprosthetic hip fracture                         Impaired mobility and ADLs ICD-10-CM: Z74.09  ICD-9-CM: 799.89  7/5/2019 Yes        Acute pulmonary embolism ([facility] [address] ICD-10-CM: I26.99  ICD-9-CM: 415.19  7/1/2019 Yes    Overview Signed 7/9/2019  5:07 PM by Charissa Emmanuel MD     Pulmonary emboli at the periphery of the right main pulmonary artery extending into the middle and lower lobe branches             Anticoagulated on warfarin (Chronic) ICD-10-CM: Z79.01  ICD-9-CM: V58.61  7/1/2019 Yes        * (Principal) Status post open reduction with internal fixation of fracture ICD-10-CM: Z96.7, Z87.81  ICD-9-CM: V45.89, V15.51  6/25/2019 Yes    Overview Signed 7/9/2019  4:52 PM by Marybeth Cruz MD     S/P Open reduction internal fixation of displaced subtrochanteric periprosthetic fracture around internal prosthetic left hip joint (6/25/2019 - Dr. Rafael Smith)             Acute blood loss as cause of postoperative anemia ICD-10-CM: D62  ICD-9-CM: 285.1  6/25/2019 Yes        Periprosthetic fracture around internal prosthetic left [facility] joint ([facility] ICD-10-CM: M97. 02XA  ICD-9-CM: 996.44, V43.64  6/24/2019 Yes    Overview Signed 7/9/2019  5:04 PM by Marybeth Cruz MD     S/P Open reduction internal fixation of displaced subtrochanteric periprosthetic fracture around internal prosthetic left hip joint (6/25/2019 - Dr. Rafael Smith)             Displaced subtrochanteric fracture of left femur [facility]) ICD-10-CM: Y69.85LH  ICD-9-CM: 820.22  6/24/2019 Yes    Overview Signed 7/9/2019  5:02 PM by Marybeth Cruz MD     S/P Open reduction internal fixation of displaced subtrochanteric periprosthetic fracture around internal prosthetic left hip joint (6/25/2019 - Dr. Rafael Smith)             History of total left hip arthroplasty ICD-10-CM: Q17.601  ICD-9-CM: V43.64  2/18/2019 Yes    Overview Addendum 7/9/2019  5:01 PM by Marybeth Cruz MD     S/P Left total hip replacement (2/18/2019 - Dr. Rafael Smith)                   Background:   Past Medical History:   Past Medical History:   Diagnosis Date    Acute blood loss as cause of postoperative anemia 6/25/2019    Acute pulmonary embolism ([facility] [address] 7/1/2019    Pulmonary emboli at the periphery of the right main pulmonary artery extending into the middle and lower lobe branches    Anticoagulated on warfarin 7/1/2019    Benign prostatic hyperplasia with lower urinary tract symptoms     Demand ischemia of myocardium ([address] 7/6/2019 4/16/18 NST EF 59% , small, reversible apical defect 7/2/19 ECHO EF 68%, LVH 14/16, LAE, RVE, ANDREY 1.4cm2 with 12mm mean gradient    Diverticulosis     Dyslipidemia     Essential tremor     Gastroesophageal reflux disease     History of pericarditis     Obesity, Class I, BMI 30-34.9     Paroxysmal atrial fibrillation ([address]     CVAL Maddy Saleh FNP 2008  AF ablation 9/11/13 EPS with isolation of RSPV and LIPV 6/4/18 TEDDY  EF 55%, 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 male recently admitted for femur fracturesent from skilled nursing facility for fever to 102.9. Patient was recently seen at [facility] for hip replacement status post fall. Patient endorses retrosternal chest pain that started upon waking this morning has been persistent since without propagating or palliating factors.   Patient also endorses increased bilateral lower extremity s    Sick sinus syndrome ([address]     Vitamin D deficiency 7/10/2019    Vitamin D 25-Hydroxy (7/10/2019) = 18.6       Patient taking anticoagulants yes Coumadin   Patient has a defibrillator: no     Assessment:   Changes in Assessment throughout shift: no     Patient has central line: no Reasons if yes: na  Insertion date:na Last dressing date:na   Patient has Truong Cath: no Reasons if yes: n/a   Insertion date:n/a     Last Vitals:     Vitals:    07/21/19 2100 07/22/19 0750 07/22/19 0833 07/22/19 1304   BP: 128/62 138/67 126/82 131/81   Pulse: 70 (!) 58 77 70   Resp: 17 16  18   Temp: 98 °F (36.7 °C) 98.3 °F (36.8 °C)     SpO2: 99% 96% 98% 98%   Weight:       Height:            PAIN    Pain Assessment    Pain Intensity 1: 5 (07/22/19 1542) Pain Intensity 1: 2 (12/29/14 1105)    Pain Location 1: Hip Pain Location 1: Abdomen    Pain Intervention(s) 1: Medication (see MAR) Pain Intervention(s) 1: Medication (see MAR)  Patient Stated Pain Goal: 0 Patient Stated Pain Goal: 0  o Intervention effective: yes    o Other actions taken for pain: no     Skin Assessment  Skin color Skin Color: Appropriate for ethnicity  Condition/Temperature Skin Condition/Temp: Warm, Dry  Integrity Skin Integrity: Incision (comment)  Turgor Turgor: Non-tenting  Weekly Pressure Ulcer Documentation  Pressure  Injury Documentation: No Pressure Injury Noted-Pressure Ulcer Prevention Initiated  Wound Prevention & Protection Methods  Orientation of wound Orientation of Wound Prevention: Posterior  Location of Prevention Location of Wound Prevention: Buttocks, Sacrum/Coccyx  Dressing Present Dressing Present : No  Dressing Status    Wound Offloading Wound Offloading (Prevention Methods): Adaptive equipment, Bed, pressure reduction mattress, Chair cushion, Elevate heels, Pillows, Repositioning, Turning, Wheelchair     INTAKE/OUPUT  Date 07/21/19 1900 - 07/22/19 0659 07/22/19 0700 - 07/23/19 0659   Shift 1950-4753 24 Hour Total 9214-8374 3746-9830 24 Hour Total   INTAKE   P.O. 360 1920        P. O. 360 1920      Shift Total(mL/kg) 360(3.5) 1920(18.6)      OUTPUT   Urine(mL/kg/hr) 2110 2110        Urine Voided 2110 2110        Urine Occurrence(s)  4 x 1 x  1 x   Emesis/NG output 0 0        Emesis 0 0        Emesis Occurrence(s)  0 x      Stool 0 0        Stool Occurrence(s)  0 x 0 x  0 x     Stool 0 0      Shift Total(mL/kg) 3490(14.8) 2110(20.4)      NET -1750 -190      Weight (kg) 103.4 103.4 103.4 103.4 103.4       Recommendations:  1. Patient needs and requests: addressed    2. Diet: cardiac    3. Pending tests/procedures: none     4. Functional Level/Equipment: whellchair    5.  Estimated Discharge Date: TBD Posted on Whiteboard in Patients Room: [facility] Safety Check    A safety check occurred in the patient's room between off going nurse and oncoming nurse listed above. The safety check included the below items  Area Items   H  High Alert Medications - Verify all high alert medication drips (heparin, PCA, etc.)   E  Equipment - Suction is set up for ALL patients (with presley)  - Red plugs utilized for all equipment (IV pumps, etc.)  - WOWs wiped down at end of shift.  - Room stocked with oxygen, suction, and other unit-specific supplies   A  Alarms - Bed alarm is set for fall risk patients  - Ensure chair alarm is in place and activated if patient is up in a chair   L  Lines - Check IV for any infiltration  - Truong bag is empty if patient has a Truong   - Tubing and IV bags are labeled   S  Safety   - Room is clean, patient is clean, and equipment is clean. - Hallways are clear from equipment besides carts. - Fall bracelet on for fall risk patients  - Ensure room is clear and free of clutter  - Suction is set up for ALL patients (with presley)  - Hallways are clear from equipment besides carts.    - Isolation precautions followed, supplies available outside room, sign posted

## 2019-07-22 NOTE — PROGRESS NOTES
Problem: Mobility Impaired (Adult and Pediatric)  Goal: *Acute Goals and Plan of Care (Insert Text)  Description  Physical Therapy Goals  Short Term Goals = Long Term Goals  Initiated 7/10/2019 and to be accomplished within 2-3 weeks (2019-2019)  1. Patient will move from supine to sit and sit to supine , scoot up and down and roll side to side in bed with modified independence. 2.  Patient will transfer from bed to chair and chair to bed with close supervision/set-up using the least restrictive device. 3.  Patient will perform sit to stand with close supervision/set-up. 4.  Patient will ambulate with close supervision/set-up for 10 feet with RW maintaining NWB left LE. 5.  Patient will propel w/c over level surfaces greater than 150 feet and over entryways, thresholds, and ramps with modified independence. Outcome: Progressing Towards Goal     PHYSICAL THERAPY DISCHARGE NOTE    Patient: Tommy Birmingham (80 y.o. male)  Date: 2019  Diagnosis: Status post open reduction with internal fixation of fracture [Z96.7, Z87.81] Status post open reduction with internal fixation of fracture  Precautions: Fall Risk Precautions   Chart, physical therapy assessment, plan of care and goals were reviewed. Time In: 0930  Time Out: 1000  Patient Seen For: Balance activities; Patient education;Transfer training;Family training  Time In: 1330  Time Out: 1430  Patient Seen For: Balance activities; Patient education;Transfer training;Family education; Therapeutic exercise  Pain:  Pt pain was reported as 6/10 pre-treatment. Pt pain was reported as 6/10 post-treatment. Intervention: Pt notes he received pain medication prior to treatment session. Patient identified with name and : yes    SUBJECTIVE:     Patient stated: \"I'll need some help with that leg,\" re: left LE for safe car transfer.     OBJECTIVE DATA SUMMARY:   AROM: Limited left hip within THP, limited left knee    FIM SCORES Initial Assessment Discharge Assessment   Bed/Chair/Wheelchair Transfers 3 4 (CGA)   Wheelchair Mobility 4 6   Walking [place] 0(Unable to safely maintain NWB with stand step transfers) 1   Steps/Stairs 0(Unable to safely maintain NWB with stand step transfers) 1 (Not Safe to Assess)   PRIMARY MODE OF LOCOMOTION: w/c mobility  Please see [facility] Interdisciplinary Eval: Coordination/Balance Section for details regarding FIM score description. BED/CHAIR/WHEELCHAIR TRANSFERS Initial Assessment Discharge Assessment   Rolling Right 4 (Minimal assistance) NT   Rolling Left 4 (Minimal assistance) NT   Supine to Sit 3 (Moderate assistance) 5 (Supervision)   Sit to Stand Moderate assistance Contact guard assistance for safety and balance with maintaining left LE NWB   Sit to Supine 3 (Moderate assistance) 4 (Minimal assistance)   Transfer Assist Score 3 4   Transfer Type Other Stand Step with RW or Lateral transfer with transfer board   Comments   Pt requires CGA for safety with stand step transfers with RW.  Pt's wife participated in assisting pt with lateral transfer with transfer board during PM treatment session with min assist for set up and pt's wife providing close supervision for safety as pt performed lateral transfer across level surfaces from mat table to w/c. Car Transfer Not tested Minimum assistance for safe left LE management into and out of vehicle with PT assisting pt with one transfer and pt's wife participating in return demonstration to assist pt with a second transfer. Car Type N/A SUV       WHEELCHAIR MOBILITY/MANAGEMENT Initial Assessment Discharge Assessment   Able to Propel 150 feet 250 feet (x 2 trials) with modified independence over level surfaces.    Functional Level 4 6   Curbs/ramps assistance required 0 (Not tested) Supervision for ramp negotiation   Wheelchair set up assistance required 3 (Moderate assistance) Mod assist for left B leg rests   Wheelchair management Manages left brake, Manages right brake Manages B brakes       WALKING INDEPENDENCE Initial Assessment Discharge Assessment   Assistive device Walker, rolling Walker, rolling   Ambulation assistance - level surface Not Safe to assess on evaluation Pt requires CGA for safety and balance. Distance 0 Feet (ft) 16 Feet (ft)(x 2 trials)   Functional Level 0(Unable to safely maintain NWB with stand step transfers) 1   Comments   Pt ambulates with decreased right foot clearance and rounded shoulder posture with B UE weightbearing on RW with pt consistently maintaining left LE NWB throughout. Pt requires max verbal cuing for posture. Ambulation assistance - unlevel surface NT Not safe to assess       STEPS/STAIRS Initial Assessment Discharge Assessment   Steps/Stairs ambulated 0 0   Rail Use None None   Functional Level 0(Unable to safely maintain NWB with stand step transfers) 1   Comments NT Not safe to assess   Curbs/Ramps NT NT     LTGs: Pt has not yet achieved LTGs for supervision due to NWB left LE and decreased functional strength. However, pt has progressed well during his stay to achieve short term goals and his wife has demonstrated ability to safely assist pt with mobility. Therapeutic Exercises:   3 Sets of 10 Repetitions:  Supine LE Strength Training:  AAROM Left, AROM RIght Hip Abd/Add (to neutral)  Bilateral Ankle Pumps  4# Right, AROM Left SAQ  Seated LE Strength Training:  Alt Hip Flex to 90 degrees, 4# Right, Left AAROM  Left LE Heel Slides/Knee Flexion stretch    ASSESSMENT:  Pt has not progressed to achieve LTGs for performing functional mobility at a supervision level due to continued functional weakness. However, pt has progressed well and his wife demonstrates ability to safely assist pt with mobility. PLAN:  Pt would benefit from continued skilled physical therapy in order to improve independent functional mobility at a home health level.  Interventions may include range of motion (AROM, PROM B LE/trunk), motor function (B LE/trunk strengthening/coordination), activity tolerance (vitals, oxygen saturation levels), bed mobility training, balance activities, gait training (progressive ambulation program), and functional transfer training. Discharge Recommendations:  Home Health Physical Therapy with 24 hour assistance  Further Equipment Recommendations for Discharge:  rolling walker and wheelchair 18 inch       Activity Tolerance:   Good  Please refer to the flowsheet for vital signs taken during this treatment. After treatment:   ? Patient left in no apparent distress sitting up in chair  ? Patient left in no apparent distress in bed  ? Call bell left within reach  ? Nursing notified  ? Caregiver present  ?  Bed alarm activated      Dahlia Sifuentes PT, DPT  7/22/2019

## 2019-07-22 NOTE — PROGRESS NOTES
[facility] PHYSICAL REHABILITATION  [address] [place] [address], [address]     INPATIENT REHABILITATION  DAILY PROGRESS NOTE     Date: 7/22/2019    Name: Mauricio Romo. Age / Sex: 80 y.o. / male   CSN: [id number] MRN: [id number]   Admit Date: 7/9/2019 Length of Stay: 13 days     Primary Rehab Diagnosis: Impaired Mobility and ADLs secondary to:  1. S/P Open reduction internal fixation of displaced subtrochanteric periprosthetic fracture around internal prosthetic left hip joint (6/25/2019 - Dr. Rafael Smith)  2. History of displaced subtrochanteric periprosthetic fracture of left femur around internal prosthetic left hip joint; S/P Left total hip replacement (2/18/2019 - Dr. Rafael Smith)      Subjective:     Patient seen and examined. Blood pressure controlled. Patient's Complaint:   No significant medical complaints     Pain Control: ongoing significant pain in which is stable and controlled by current meds      Objective:     Vital Signs:  Patient Vitals for the past 24 hrs:   BP Temp Pulse Resp SpO2   07/22/19 1304 131/81  70 18 98 %   07/22/19 0833 126/82  77  98 %   07/22/19 0750 138/67 98.3 °F (36.8 °C) (!) 58 16 96 %   07/21/19 2100 128/62 98 °F (36.7 °C) 70 17 99 %   07/21/19 1552 134/67 98.4 °F (36.9 °C) 72 16 97 %        Physical Examination:  GENERAL SURVEY: Patient is awake, alert, oriented x 3, sitting comfortably on the chair, not in acute respiratory distress.   HEENT: pale palpebral conjunctivae, anicteric sclerae, no nasoaural discharge, moist oral mucosa  NECK: supple, no jugular venous distention, no palpable lymph nodes  CHEST/LUNGS: symmetrical chest expansion, good air entry, clear breath sounds  HEART: adynamic precordium, good S1 S2, no S3, regular rhythm, no murmurs  ABDOMEN: obese, bowel sounds appreciated, soft, non-tender  EXTREMITIES: pale nailbeds, no edema, full and equal pulses, no calf tenderness   NEUROLOGICAL EXAM: The patient is awake, alert and oriented x3, able to answer questions fairly appropriately, able to follow 1 and 2 step commands. Able to tell time from the wall clock. Cranial nerves II-XII are grossly intact. No gross sensory deficit. Motor strength is 4+/5 on BUE, 4 to 4+/5 on the RLE, 2-/5 on the LLE (except 3- on the left ankle).      Incision(s): healing well, clean, dry, and intact      Current Medications:  Current Facility-Administered Medications   Medication Dose Route Frequency    docusate sodium (COLACE) capsule 100 mg  100 mg Oral BID PRN    diclofenac (FLECTOR) 1.3 % transdermal patch 1 Patch  1 Patch TransDERmal Q12H    cholecalciferol (VITAMIN D3) capsule 5,000 Units  5,000 Units Oral DAILY    calcium citrate tablet 200 mg [elemental]  200 mg Oral BID    warfarin - physician to dose   Other Q24H    ferrous sulfate tablet 325 mg  1 Tab Oral DAILY WITH BREAKFAST    ascorbic acid (vitamin C) (VITAMIN C) tablet 250 mg  250 mg Oral DAILY WITH BREAKFAST    methocarbamol (ROBAXIN) tablet 500 mg  500 mg Oral Q8H    acetaminophen (TYLENOL) tablet 650 mg  650 mg Oral Q4H PRN    bisacodyl (DULCOLAX) tablet 10 mg  10 mg Oral Q48H PRN    HYDROcodone-acetaminophen (NORCO) 5-325 mg per tablet 1 Tab  1 Tab Oral Q4H PRN    simvastatin (ZOCOR) tablet 10 mg  10 mg Oral QHS       Allergies:  No Known Allergies      Lab/Data Review:  Recent Results (from the past 24 hour(s))   METABOLIC PANEL, BASIC    Collection Time: 07/22/19  6:22 AM   Result Value Ref Range    Sodium 139 136 - 145 mmol/L    Potassium 4.5 3.5 - 5.5 mmol/L    Chloride 104 100 - 111 mmol/L    CO2 30 21 - 32 mmol/L    Anion gap 5 3.0 - 18 mmol/L    Glucose 94 74 - 99 mg/dL    BUN 16 7.0 - 18 MG/DL    Creatinine 0.94 0.6 - 1.3 MG/DL    BUN/Creatinine ratio 17 12 - 20      GFR est AA >60 >60 ml/min/1.73m2    GFR est non-AA >60 >60 ml/min/1.73m2    Calcium 8.4 (L) 8.5 - 10.1 MG/DL   HGB & HCT    Collection Time: 07/22/19  6:22 AM   Result Value Ref Range    HGB 9.6 (L) 13.0 - 16.0 g/dL    HCT 31.4 (L) 36.0 - 48.0 %   PROTHROMBIN TIME + INR    Collection Time: 07/22/19  6:22 AM   Result Value Ref Range    Prothrombin time 24.5 (H) 11.5 - 15.2 sec    INR 2.2 (H) 0.8 - 1.2         Estimated Glomerular Filtration Rate:  On admission, estimated GFR based on a Creatinine of 0.89 mg/dl:              Using CKD-EPI = 78.5 mL/min/1.73m2              Using MDRD = 86.6 mL/min/1.73m2      Assessment:     Primary Rehabilitation Diagnosis  1. Impaired Mobility and ADLs  2. S/P Open reduction internal fixation of displaced subtrochanteric periprosthetic fracture around internal prosthetic left hip joint (6/25/2019 - Dr. Manuel Huntley)  3. History of displaced subtrochanteric periprosthetic fracture of left femur around internal prosthetic left hip joint; S/P Left total hip replacement (2/18/2019 - Dr. 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    Mild aortic stenosis I35.0    Acute blood loss as cause of postoperative anemia D62    Acute pulmonary embolism  I26.99    Anticoagulated on warfarin Z79.01    Primary osteoarthritis involving multiple joints M15.0    Gastroesophageal reflux disease K21.9    Dyslipidemia E78.5    Diverticulosis K57.90    History of deep vein thrombosis of lower extremity Z86.718    Sick sinus syndrome  I49.5    Benign prostatic hyperplasia with lower urinary tract symptoms N40.1    Tremor R25.1    Obesity, Class I, BMI 30-34.9 E66.9    Vitamin D deficiency E55.9    Essential tremor G25.0    History of pericarditis Z86.79        Plan:     1. Justification for continued stay: Good progression towards established rehabilitation goals.     2. Medical Issues being followed closely:    [x]  Fall and safety precautions     [x]  Wound Care     [x]  Bowel and Bladder Function     [x]  Fluid Electrolyte and Nutrition Balance     [x]  Pain Control      3. Issues that 24 hour rehabilitation nursing is following:    [x]  Fall and safety precautions     [x]  Wound Care     [x]  Bowel and Bladder Function     [x]  Fluid Electrolyte and Nutrition Balance     [x]  Pain Control      [x]  Assistance with and education on in-room safety with transfers to and from the bed, wheelchair, toilet and shower. 4. Acute rehabilitation plan of care:    [x]  Continue current care and rehab. [x]  Physical Therapy           [x]  Occupational Therapy           []  Speech Therapy     []  Hold Rehab until further notice     5. Medications:    [x]  MAR Reviewed     [x]  Continue Present Medications     6. DVT Prophylaxis:      []  Lovenox     []  Unfractionated Heparin     [x]  Coumadin     []  NOAC     []  WINDY Stockings     []  Sequential Compression Device     []  None     7. Orders:   > S/P Open reduction internal fixation of displaced subtrochanteric periprosthetic fracture around internal prosthetic left hip joint (6/25/2019 - Dr. Lori Robbins);  History of displaced subtrochanteric periprosthetic fracture of left femur around internal prosthetic left hip joint; S/P Left total hip replacement (2/18/2019 - Dr. Lori Robbins)              > Nonweightbearing on the left lower extremity              > Total hip precautions on left hip              > Staples were removed on 7/9/2019               > On 7/10/2019, started Calcium citrate 1 tab PO BID   > Continue Calcium citrate 1 tab PO BID     > Acute Postoperative Blood Loss Anemia              > Hgb/Hct (7/10/2019, on admission) = 8.8/28.0              > Anemia work-up showed serum iron 40, TIBC 237, iron % saturation 17, ferritin 568, reticulocyte count 5.0   > On 7/10/2019, started FeSO4 and Ascorbic acid   > Hgb/Hct (7/15/2019) = 9.3/29.5    > Hgb/Hct (7/18/2019) = 9.3/29.6    > Hgb/Hct (7/22/2019) = 9.6/31.4    > Continue: > FeSO4 325 mg PO once daily with breakfast                > Ascorbic Acid 250 mg PO once daily with breakfast (to enhance the absorption of the FeSO4)      > Acute pulmonary embolism, anticoagulated on Warfarin              > Target INR = 2 to 3              > INR (7/22/2019) = 2.2              > Patient received Warfarin 4 mg PO last night               > Warfarin 6 mg PO tonight                > Sepsis due to Pseudomonas aeruginosa              > Blood culture (7/5/2019; 1 out of 2 sets) yielded growth of PSeudomonas aeruginosa (PANSENSITIVE)              > On 7/5/2019, patient was empirically started on Piperacillin-Tazobactam 4.5 grams IV q 6 hr and a single dose of Vancomycin IV was given              > On 7/9/2019, Piperacillin-Tazobactam IV was discontinued and patient was discharged from [facility] on oral Ciprofloxacin 500 mg PO q 12 hr x 4 days              > On 7/13/2019, patient had completed the recommended 4-day treatment course of Ciprofloxacin 500 mg PO q 12 hr      > Vitamin D Deficiency              > Vitamin D 25-Hydroxy (7/10/2019) = 18.6              > On 7/10/2019, patient was given Cholecalciferol 50,000 units PO x 1 dose               > On 7/11/2019, started Cholecalciferol 5,000 units PO once daily   > Continue Cholecalciferol 5,000 units PO once daily     > Increased urinary frequency   > Patient stated he has had increased urinary frequency even prior to admission to the acute care hospital; patient inquired of options of treatment for increased frequency / possible prostate issues; patient stated he tried Tamsulosin in the past with no reported adverse reactions   > On 7/17/2019, started a trial of Tamsulosin 0.4 mg PO once daily   > On 7/19/2019, discontinued Tamsulosin 0.4 mg PO once daily (re: per request of patient - possible cause of postural hypotension noted this AM with therapy)    > Difficulty sleeping   > On 7/17/2019, added Diphenhydramine 25 mg PO q HS PRN for sleep   > On 7/19/2019, discontinued Diphenhydramine 25 mg PO q HS PRN for sleep (re: per request of patient)    > Analgesia   > On 7/15/2019, added Diclofenac 1.3% transdermal patch, 1 patch on skin of left thigh q 12 hr   > Continue:               > Acetaminophen 650 mg PO q 4 hr PRN for pain level less than 5/10    > Diclofenac 1.3% transdermal patch, 1 patch on skin of left thigh q 12 hr               > Methocarbamol 500 mg PO q 8 hr                > Norco 5/325 1 tab PO q 4 hr PRN for pain level greater than 4/10      > Diet:              > Specifications: Low saturated fat              > Solids (consistency): Regular               > Liquids (consistency): Thin       8. Patient's progress in rehabilitation and medical issues discussed with the patient. All questions answered to the best of my ability. Care plan discussed with patient and nurse. 9. Discharge Planning:  Discharge date  Tuesday (7/23/2019)   Discharge location  [x] Home     [] [address]    [] Other:    Follow-up services  [] Outpatient      [x] Home Health       [x] Physical Therapy              [x] Occupational Therapy       [] Speech Therapy                [] Medical Social Worker    [] Aide   [x] Skilled Nursing           [x] Medication reconciliation        [x] Disease education        [x] PT/INR monitoring        [] Post-CABG care/monitoring        [] Colostomy/Ileostomy care        [] Routine PICC line care        [] IV antibiotic administration            Antibiotic:             Stop date:        [] Tube feeding        [] Indwelling jasso catheter care        [] Wound care            Instructions:___________________________   Follow-up appointments  1. PCP Candance Barker)   2.  Orthopedics (Dr. Farhan West)         Signed:    Amrik Jimenez MD    July 22, 2019

## 2019-07-23 VITALS
HEIGHT: 74 IN | SYSTOLIC BLOOD PRESSURE: 139 MMHG | OXYGEN SATURATION: 96 % | WEIGHT: 228 LBS | HEART RATE: 65 BPM | DIASTOLIC BLOOD PRESSURE: 65 MMHG | TEMPERATURE: 97.4 F | BODY MASS INDEX: 29.26 KG/M2 | RESPIRATION RATE: 16 BRPM

## 2019-07-23 LAB
INR PPP: 2.1 (ref 0.8–1.2)
PROTHROMBIN TIME: 23.8 SEC (ref 11.5–15.2)

## 2019-07-23 PROCEDURE — 36415 COLL VENOUS BLD VENIPUNCTURE: CPT

## 2019-07-23 PROCEDURE — 85610 PROTHROMBIN TIME: CPT

## 2019-07-23 PROCEDURE — 74011250637 HC RX REV CODE- 250/637: Performed by: INTERNAL MEDICINE

## 2019-07-23 RX ADMIN — Medication 5000 UNITS: at 08:12

## 2019-07-23 RX ADMIN — CALCIUM CITRATE 200 MG (950 MG) TABLET 200 MG: at 08:12

## 2019-07-23 RX ADMIN — DOCUSATE SODIUM 100 MG: 100 CAPSULE, LIQUID FILLED ORAL at 08:11

## 2019-07-23 RX ADMIN — Medication 250 MG: at 08:12

## 2019-07-23 RX ADMIN — METHOCARBAMOL TABLETS 500 MG: 500 TABLET, COATED ORAL at 06:15

## 2019-07-23 RX ADMIN — DOCUSATE SODIUM 100 MG: 100 CAPSULE, LIQUID FILLED ORAL at 17:00

## 2019-07-23 RX ADMIN — HYDROCODONE BITARTRATE AND ACETAMINOPHEN 1 TABLET: 5; 325 TABLET ORAL at 08:13

## 2019-07-23 RX ADMIN — CALCIUM CITRATE 200 MG (950 MG) TABLET 200 MG: at 17:00

## 2019-07-23 RX ADMIN — ACETAMINOPHEN 650 MG: 325 TABLET ORAL at 09:44

## 2019-07-23 RX ADMIN — HYDROCODONE BITARTRATE AND ACETAMINOPHEN 1 TABLET: 5; 325 TABLET ORAL at 14:42

## 2019-07-23 RX ADMIN — FERROUS SULFATE TAB 325 MG (65 MG ELEMENTAL FE) 325 MG: 325 (65 FE) TAB at 08:12

## 2019-07-23 RX ADMIN — METHOCARBAMOL TABLETS 500 MG: 500 TABLET, COATED ORAL at 14:42

## 2019-07-23 NOTE — DISCHARGE INSTRUCTIONS
Patient Education        Helping Your Child Take Blood Thinners Safely: Care Instructions  Your Care Instructions    Blood thinners are medicines that help prevent blood clots and keep them from growing bigger. They don't actually thin the blood. They slow down the time it takes for a blood clot to form. Blood thinners also keep existing blood clots from getting bigger. Your doctor may call these medicines anticoagulants. Your child may take this medicine as a pill. Or the blood thinner may be given as a shot. Blood thinners can make a child more likely to bleed. But they can also save lives. With care, you can help prevent bleeding and keep your child safe while letting him or her play and be active. Follow-up care is a key part of your child's treatment and safety. Be sure to make and go to all appointments, and call your doctor if your child is having problems. It's also a good idea to know your child's test results and keep a list of the medicines your child takes. How can you care for your child at home? · Be safe with medicines. Have your child take medicines exactly as prescribed. Call your doctor if you think your child is having a problem with his or her medicine. · If your child misses a dose of medicine, don't give an extra dose to make up for it. Your doctor can tell you what to do so you don't give too much or too little. · Talk to the doctor before you start or stop giving your child any medicines, vitamins, or natural remedies. Medicines may affect how blood thinners work. · Ask your pharmacist how to store the blood thinner. · Have your child wear medical alert jewelry. This lets others know that your child takes a blood thinner. You can buy it at most drugstores. · Tell your child's doctors, dentist, and pharmacist that your child is taking a blood thinner. Also tell the people who care for your child, such as relatives, babysitters, and the school nurse.  Let them know what to do if your child has a cut or bruise and when to call for help. · If your child takes heparin, learn how to give a shot. Ask your doctor for instructions. · If your child takes warfarin:  ? Keep the amount of vitamin K in your child's diet about the same from day to day. Vitamin K affects how warfarin works and how the blood clots. It is in many foods, such as leafy greens, green vegetables, and vegetable oils. ? Take your child to the doctor for scheduled blood tests. These tests check how long it takes the blood to form a clot. The doctor may adjust your child's dose of warfarin based on the results. When should you call for help? Call 911 anytime you think your child may need emergency care. For example, call if:    · Your child passes out (loses consciousness).     · Your child has signs of severe bleeding, such as:  ? A severe headache that is different from past headaches. ? Vomiting blood or what looks like coffee grounds. ? Passing maroon or very bloody stools.    Call your doctor now or seek immediate medical care if:    · Your child has unexpected bleeding, including:  ? Blood in stools or black stools that look like tar.  ? Blood in his or her urine. ? Bruises or blood spots under the skin.     · Your child feels dizzy or lightheaded.    Watch closely for changes in your child's health, and be sure to contact your doctor if:    · Your child does not get better as expected. Where can you learn more? Go to http://ricardo-filiberto.info/. Enter C771 in the search box to learn more about \"Helping Your Child Take Blood Thinners Safely: Care Instructions. \"  Current as of: July 22, 2018  Content Version: 12.1  © 1874-3726 [employer]. Care instructions adapted under license by [employer]) (which disclaims liability or warranty for this information).  If you have questions about a medical condition or this instruction, always ask your healthcare professional. Anny Mart[facility]nez [facility] disclaims any warranty or liability for your use of this information. Patient Education        Deciding Between Electrical Cardioversion and Rate Control Medicines for Atrial Fibrillation  How can you decide between electrical cardioversion and rate control medicines for atrial fibrillation? What is atrial fibrillation? Atrial fibrillation (say \"JENIFFER-tree-sunny srs-jial-NCO-shun\") is a kind of uneven heartbeat. It can make you feel lightheaded and dizzy. You may feel weak. It also can make you more likely to have a stroke. Electrical cardioversion can return your heart to a normal rhythm. First you'll get medicines to make you sleepy and control pain. Then your doctor will use patches to send an electric current to your heart. This resets the rhythm of your heart. Not everyone with atrial fibrillation needs this treatment. For some people, taking medicines may be better. Most people can live with an uneven heartbeat. It just has to be kept under control so the heart does not beat too fast.  Use this information to help you and your doctor decide which treatment to choose for atrial fibrillation. What are sanchez points about this decision? · Electrical cardioversion can return your heart to a normal rhythm. But the problem can come back. The longer you have had atrial fibrillation, the more likely it is to come back after this treatment. · Cardioversion may not work as well when an uneven heartbeat is caused by another heart disease, such as heart failure. · If your symptoms bother you a lot, you may want to try cardioversion. But even if it works, you may still need to take blood thinners to prevent a stroke. · If you don't have symptoms, or if they don't bother you much, you can try medicines to slow your heart rate. And you can take blood thinners to prevent a stroke. · Cardioversion does have risks, such as stroke. Discuss the risks with your doctor. Make sure you understand them.   · You may have more than one heart problem. Cardioversion doesn't work as well if you have more than one heart problem. Why might you choose electrical cardioversion? · It restores the normal heart rhythm for most people. · The idea of having an electric shock does not bother you. · Your symptoms bother you a lot. · You have had atrial fibrillation just one time. · You do not have other heart problems. · You may not have to take as many medicines. Or you may not need to take them as long. Why might you choose rate-control medicines? · These medicines keep many people from having symptoms. · You prefer to take medicines rather than have an electric shock. · Your symptoms don't bother you much. · If these medicines don't work, you can still try electrical cardioversion. Your decision  Thinking about the facts and your feelings can help you make a decision that is right for you. Be sure you understand the benefits and risks of your options. And think about what else you need to do before you make the decision. Where can you learn more? Go to http://ricardo-filiberto.info/. Enter Q581 in the search box to learn more about \"Deciding Between Electrical Cardioversion and Rate Control Medicines for Atrial Fibrillation. \"  Current as of: July 22, 2018  Content Version: 12.1  © 7311-0812 [employer], [employer]. Care instructions adapted under license by [employer] (which disclaims liability or warranty for this information). If you have questions about a medical condition or this instruction, always ask your healthcare professional. [place] [address] any warranty or liability for your use of this information. [employer]. Activation    Thank you for requesting access to [employer] Please follow the instructions below to securely access and download your online medical record.  [employer] allows you to send messages to your doctor, view your test results, renew your prescriptions, schedule appointments, and more. How Do I Sign Up? 1. In your internet browser, go to [email or web address]  2. Click on the First Time User? Click Here link in the Sign In box. You will be redirect to the New Member Sign Up page. 3. Enter your Sportlobster Access Code exactly as it appears below. You will not need to use this code after youve completed the sign-up process. If you do not sign up before the expiration date, you must request a new code. Sportlobster Access Code: [email or web address]  Expires: 2019  5:52 PM (This is the date your Sportlobster access code will )    4. Enter the last four digits of your Social Security Number (xxxx) and Date of Birth (mm/dd/yyyy) as indicated and click Submit. You will be taken to the next sign-up page. 5. Create a Sportlobster ID. This will be your Sportlobster login ID and cannot be changed, so think of one that is secure and easy to remember. 6. Create a Sportlobster password. You can change your password at any time. 7. Enter your Password Reset Question and Answer. This can be used at a later time if you forget your password. 8. Enter your e-mail address. You will receive e-mail notification when new information is available in [address] 9. Click Sign Up. You can now view and download portions of your medical record. 10. Click the Download Summary menu link to download a portable copy of your medical information. Additional Information    If you have questions, please visit the Frequently Asked Questions section of the Sportlobster website at [email or web address] Remember, Sportlobster is NOT to be used for urgent needs. For medical emergencies, dial 911. Patient armband removed and shredded           ------------------------------------------------------------------------------------------------------------    DISCHARGE INSTRUCTIONS    1.  Make sure that when you request refills at the pharmacy that the refill requests are sent to your PCP (NOT to the prescriber at the [facility]) to avoid any delays in getting your medication refills. The physician at the [facility] will not able to order medications or refills after discharge -- Please understand that though we would like to help, it is simply not safe for our physician to order you a medication that we cannot monitor. 2. If any of the prescribed medications require a PRIOR AUTHORIZATION, contact your Primary Care Physician or specialist to EITHER complete prior authorizations and paperwork on your behalf OR prescribe an alternative medication. -- Please understand that though we would like to help, it is simply not safe for our physician to order you a medication that we cannot monitor. 3. If any of your prescriptions medications PRIOR TO ADMISSION TO [place] [address] needs a refill, kindly contact your Primary Care Physician for refills.     -------------------------------------------------------------------------------------------------------------------

## 2019-07-23 NOTE — PROGRESS NOTES
SHIFT CHANGE NOTE FOR [facility]    Bedside and Verbal shift change report  Catherine RN (oncoming nurse) by Long Baez, RN (offgoing nurse). Report included the following information SBAR, Kardex, MAR and Recent Results. Situation:   Code Status: Full Code   Reason for Admission: S/P ORIF  Hospital Day: 14   Problem List:   Hospital Problems  Date Reviewed: 7/22/2019          Codes Class Noted POA    Increased urinary frequency (Chronic) ICD-10-CM: R35.0  ICD-9-CM: 788.41  7/16/2019 Yes        Vitamin D deficiency (Chronic) ICD-10-CM: E55.9  ICD-9-CM: 268.9  7/10/2019 Yes    Overview Signed 7/10/2019 11:23 AM by Kaity Francisco MD     Vitamin D 25-Hydroxy (7/10/2019) = 18.6              Sepsis due to Pseudomonas species [facility]) ICD-10-CM: A41.52  ICD-9-CM: 038.43, 995.91  7/5/2019 Yes    Overview Addendum 7/6/2019  8:16 PM by Sukumar Kilgore MD     7/5/19      80 y.o. male recently admitted for femur fracturesent from skilled nursing [facility] for fever to 102.9. Patient was recently seen at [facility] for hip replacement status post fall. Patient endorses retrosternal chest pain that started upon waking this morning has been persistent since without propagating or palliating factors. Patient also endorses increased bilateral lower extremity swelling and difficulty breathing. WBC 29.2  P-87%,  U/A neg  CT  Heterogeneous collection posterior to the greater trochanter, most consistent with a postoperative hematoma. No rim enhancement to suggest abscess  CXR Small bilateral pleural effusions, left greater than right. Mild pulmonary edema.  No consolidation  7/6/19 ORTHO (Romi Prows) Benign appearing left hip and femur now 11 days s/p ORIF periprosthetic hip fracture                         Impaired mobility and ADLs ICD-10-CM: Z74.09  ICD-9-CM: 799.89  7/5/2019 Yes        Acute pulmonary embolism ([facility] ICD-10-CM: I26.99  ICD-9-CM: 415.19  7/1/2019 Yes    Overview Signed 7/9/2019  5:07 PM by Kaity Francisco MD Pulmonary emboli at the periphery of the right main pulmonary artery extending into the middle and lower lobe branches             Anticoagulated on warfarin (Chronic) ICD-10-CM: Z79.01  ICD-9-CM: V58.61  7/1/2019 Yes        * (Principal) Status post open reduction with internal fixation of fracture ICD-10-CM: Z96.7, Z87.81  ICD-9-CM: V45.89, V15.51  6/25/2019 Yes    Overview Signed 7/9/2019  4:52 PM by Cristy Osorio MD     S/P Open reduction internal fixation of displaced subtrochanteric periprosthetic fracture around internal prosthetic left hip joint (6/25/2019 - Dr. Abhay Cardona)             Acute blood loss as cause of postoperative anemia ICD-10-CM: D62  ICD-9-CM: 285.1  6/25/2019 Yes        Periprosthetic fracture around internal prosthetic [facility] joint [facility] ICD-10-CM: M97. 02XA  ICD-9-CM: 996.44, V43.64  6/24/2019 Yes    Overview Signed 7/9/2019  5:04 PM by Cristy Osorio MD     S/P Open reduction internal fixation of displaced subtrochanteric periprosthetic fracture around internal prosthetic left hip joint (6/25/2019 - Dr. Abhay Cardona)             Displaced subtrochanteric fracture of left femur [facility]) ICD-10-CM: N88.24ZL  ICD-9-CM: 820.22  6/24/2019 Yes    Overview Signed 7/9/2019  5:02 PM by Cristy Osorio MD     S/P Open reduction internal fixation of displaced subtrochanteric periprosthetic fracture around internal prosthetic left hip joint (6/25/2019 - Dr. Abhay Cardona)             History of total left hip arthroplasty ICD-10-CM: U14.303  ICD-9-CM: V43.64  2/18/2019 Yes    Overview Addendum 7/9/2019  5:01 PM by Cristy Osorio MD     S/P Left total hip replacement (2/18/2019 - Dr. Abhay Cardona)                   Background:   Past Medical History:   Past Medical History:   Diagnosis Date    Acute blood loss as cause of postoperative anemia 6/25/2019    Acute pulmonary embolism ([facility] 7/1/2019    Pulmonary emboli at the periphery of the right main pulmonary artery extending into the middle and lower lobe branches    Anticoagulated on warfarin 7/1/2019    Benign prostatic hyperplasia with lower urinary tract symptoms     Demand ischemia of myocardium ([address] 7/6/2019 4/16/18 NST EF 59% , small, reversible apical defect 7/2/19 ECHO EF 68%, LVH 14/16, LAE, RVE, ANDREY 1.4cm2 with 12mm mean gradient    Diverticulosis     Dyslipidemia     Essential tremor     Gastroesophageal reflux disease     History of pericarditis     Obesity, Class I, BMI 30-34.9     Paroxysmal atrial fibrillation ([address]     CVAL Maddy Saleh FNP 2008  AF ablation 9/11/13 EPS with isolation of RSPV and LIPV 6/4/18 TEDDY  EF 55%, 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 male recently admitted for femur fracturesent from skilled nursing facility for fever to 102.9. Patient was recently seen at [facility] for hip replacement status post fall. Patient endorses retrosternal chest pain that started upon waking this morning has been persistent since without propagating or palliating factors.   Patient also endorses increased bilateral lower extremity s    Sick sinus syndrome ([address]     Vitamin D deficiency 7/10/2019    Vitamin D 25-Hydroxy (7/10/2019) = 18.6       Patient taking anticoagulants yes Coumadin   Patient has a defibrillator: no     Assessment:   Changes in Assessment throughout shift: no     Patient has central line: no Reasons if yes: na  Insertion date:na Last dressing date:na   Patient has Truong Cath: no Reasons if yes: n/a   Insertion date:n/a     Last Vitals:     Vitals:    07/22/19 0750 07/22/19 0833 07/22/19 1304 07/22/19 2200   BP: 138/67 126/82 131/81 134/64   Pulse: (!) 58 77 70 67   Resp: 16  18 18   Temp: 98.3 °F (36.8 °C)   97.9 °F (36.6 °C)   SpO2: 96% 98% 98% 97%   Weight:       Height:            PAIN    Pain Assessment    Pain Intensity 1: 0 (07/23/19 0400) Pain Intensity 1: 2 (12/29/14 1105)    Pain Location 1: Hip Pain Location 1: Abdomen    Pain Intervention(s) 1: Medication (see MAR) Pain Intervention(s) 1: Medication (see MAR)  Patient Stated Pain Goal: 0 Patient Stated Pain Goal: 0  o Intervention effective: yes    o Other actions taken for pain: no     Skin Assessment  Skin color Skin Color: Appropriate for ethnicity  Condition/Temperature Skin Condition/Temp: Dry, Warm  Integrity Skin Integrity: Incision (comment)  Turgor Turgor: Non-tenting  Weekly Pressure Ulcer Documentation  Pressure  Injury Documentation: No Pressure Injury Noted-Pressure Ulcer Prevention Initiated  Wound Prevention & Protection Methods  Orientation of wound Orientation of Wound Prevention: Posterior  Location of Prevention Location of Wound Prevention: Buttocks, Sacrum/Coccyx  Dressing Present Dressing Present : No  Dressing Status    Wound Offloading Wound Offloading (Prevention Methods): Adaptive equipment, Bed, pressure reduction mattress, Chair cushion, Elevate heels, Pillows, Repositioning, Turning, Wheelchair     INTAKE/OUPUT  Date 07/22/19 0700 - 07/23/19 0659 07/23/19 0700 - 07/24/19 0659   Shift 2163-3506 0884-3849 24 Hour Total 8726-8610 2037-2976 24 Hour Total   INTAKE   Shift Total(mL/kg)         OUTPUT   Urine(mL/kg/hr)           Urine Occurrence(s) 1 x  1 x      Stool           Stool Occurrence(s) 0 x  0 x      Shift Total(mL/kg)         NET         Weight (kg) 103.4 103.4 103.4 103.4 103.4 103.4       Recommendations:  1. Patient needs and requests: addressed    2. Diet: cardiac    3. Pending tests/procedures: none     4. Functional Level/Equipment: whellchair    5. Estimated Discharge Date: TBD Posted on Whiteboard in Patients Room: no     [facility]    A safety check occurred in the patient's room between off going nurse and oncoming nurse listed above.     The safety check included the below items  Area Items H  High Alert Medications - Verify all high alert medication drips (heparin, PCA, etc.)   E  Equipment - Suction is set up for ALL patients (with presley)  - Red plugs utilized for all equipment (IV pumps, etc.)  - WOWs wiped down at end of shift.  - Room stocked with oxygen, suction, and other unit-specific supplies   A  Alarms - Bed alarm is set for fall risk patients  - Ensure chair alarm is in place and activated if patient is up in a chair   L  Lines - Check IV for any infiltration  - Truong bag is empty if patient has a Truong   - Tubing and IV bags are labeled   S  Safety   - Room is clean, patient is clean, and equipment is clean. - Hallways are clear from equipment besides carts. - Fall bracelet on for fall risk patients  - Ensure room is clear and free of clutter  - Suction is set up for ALL patients (with presley)  - Hallways are clear from equipment besides carts.    - Isolation precautions followed, supplies available outside room, sign posted

## 2019-07-23 NOTE — DISCHARGE SUMMARY
[facility] PHYSICAL REHABILITATION  [address] [place] [address], [phone] [facility] SUMMARY    Name: Riaz Wheeler MRN: [id number]   Age / Sex: 80 y.o. / male CSN: [id number]   YOB: 1934 Length of Stay: 14 days   Admit Date: 7/9/2019 Discharge Date: 7/23/2019        PRIMARY CARE PHYSICIAN: Tor Pate MD      DISCHARGE DIAGNOSES:    Primary Rehabilitation Diagnosis  1.  Impaired Mobility and ADLs  2. S/P Open reduction internal fixation of displaced subtrochanteric periprosthetic fracture around internal prosthetic left hip joint (6/25/2019 - Dr. Andre Balderas)  3. History of displaced subtrochanteric periprosthetic fracture of left femur around internal prosthetic left hip joint; S/P Left total hip replacement (2/18/2019 - Dr. 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    Mild aortic stenosis I35.0    Acute blood loss as cause of postoperative anemia D62    Acute pulmonary embolism  I26.99    Anticoagulated on warfarin Z79.01    Primary osteoarthritis involving multiple joints M15.0    Gastroesophageal reflux disease K21.9    Dyslipidemia E78.5    Diverticulosis K57.90    History of deep vein thrombosis of lower extremity Z86.718    Sick sinus syndrome  I49.5    Benign prostatic hyperplasia with lower urinary tract symptoms N40.1    Tremor R25.1    Obesity, Class I, BMI 30-34.9 E66.9    Vitamin D deficiency E55.9    Essential tremor G25.0    History of pericarditis Z86.79        CONSULTS CALLED: None      PROCEDURES DONE: None      BRIEF HISTORY: The patient is an 80year-old White male with multiple medical comorbidities who was admitted to [facility] on 7/5/2019 due to fever.      On 2/18/2019, patient underwent a left total hip replacement done by Dr. Manuel Huntley.     On 6/24/2019, while working in hi yard, the patient had a fall and landed on his left hip. He denied any loss of consciousness. He was unable to stand up and ambulate due to severe left hip pain. The patient was brought to the [facility] Emergency Department for further evaluation. The patient was seen and examined by Emergency Medicine (Maximiliano Jasso).     Excerpt (Additional History) from the ED Provider Note by Maximiliano Jasso:  \"Alessandro Peterson Jr. is a 80 y. o. male with Previous left hip replacement by Dr. Jalil Martinez in [address] [place] [address] presents with a mechanical fall or working in his yard, turned and twisted and lost his footing and fell onto his left side.  Had immediate left hip pain and inability to bear weight. Jeannette Jain has some mild pain in his left knee.  No other injuries or complaints.  No loss of consciousness, head trauma, neck pain, nausea vomiting diarrhea, abdominal pain. \"     X-ray of the left femur (6/24/2019) showed an offset angulated fracture through the subtrochanteric left femur involving the femoral stem of the total left hip arthroplasty.     X-ray of bilateral hips (6/24/2019) showed a distracted and angulated acute fracture of the proximal femoral diaphysis extending to the intramedullary stem of the femoral component of the left hip replacement; negative right hip.     X-ray of the left knee (6/24/2019) showed no fracture nor dislocation.     Hgb/Hct (6/24/2019) = 12.2/35.3      The patient was transferred to THE [facility] and was admitted under the service of Orthopedics (Dr. Anthony Meek).    Excerpt (History of the Present Illness) from the H&P by Sherrie Cruz:  \"A very pleasant 80-year-old  gentleman who was walking on a hard surface, reached over to  an object, lost his balance immediately and fell directly onto the left hip.  Transported to [facility]. ER evaluation identified a fracture. Emergency room doctor contacted the [facility] for arrangement of transfer. Index primary left total hip performed by Dr. Jalil Martinez on February, 2019. History of left total knee done elsewhere, right total knee done at this facility as well according to the patient. He was evaluated in his hospital bed reporting fairly significant left thigh pain. Prior to the fall, doing extremely well. He takes a baby aspirin. Actually off Plavix after having an ablation and a watchman device in 2018. Pacemaker present. Has been n.p.o. since midnight. Allergies include Meperidine, adhesive glutens, milk, ACE inhibitors. No history of PE or DVT. \"     On 6/25/2019, the patient underwent an Open reduction internal fixation of displaced subtrochanteric periprosthetic fracture around internal prosthetic left hip joint done by Dr. Clarke Angry patient tolerated the procedure well with no intraoperative complications. The patient developed acute postoperative blood loss anemia and the patient was transfused a total of 5 units pRBC.     On 7/1/2019, CT angiogram of the abdomen and pelvis showed pulmonary emboli at the periphery of the right main pulmonary artery extending into the middle and lower lobe branches. Patient was started on anticoagulation with Warfarin.     As per Orthopedics, the patient is to be toe touch weight bearing on the left lower extremity.     On 7/5/2019, the patient was discharged / transferred to a [address]).    -     On 7/5/2019, after ~4 hours at the [address]), patient was noted to have a fever. The patient was brought to [facility] Emergency Department for further evaluation.  The patient was seen and examined by Emergency Medicine (Rell Davison).     Excerpt (Additional History) from the ED Provider Note by iLnda Randhawa:  \"67 y. o. male recently admitted for femur fracturesent from skilled nursing facility for fever to 102.9.  Patient was recently seen at [facility] for hip replacement status post fall.  Patient endorses retrosternal chest pain that started upon waking this morning has been persistent since without propagating or palliating factors.  Patient also endorses increased bilateral lower extremity swelling and difficulty breathing.  She has no sequela of occult sickness denies abdominal pain nausea vomiting diarrhea or symptoms consistent with upper respiratory infection.  He did have a Truong placed during his last hospitalization, removed 7/4 per records. [facility] course was complicated by a PE, he is currently on anticoagulation. \"     X-ray of the chest x-ray (7/5/2019) showed mild pulmonary edema with small bilateral pleural effusions.     CT scan of the left lower extremity (7/5/2019) showed left total hip arthroplasty with a periprosthetic fracture status post plate and screw fixation and cerclage wire placement; heterogeneous collection posterior to the greater trochanter, most consistent with a postoperative hematoma; no rim enhancement to suggest abscess, though early superinfection of the hematoma cannot be excluded.     WBC count (7/5/2019) = 18.6     Hgb/Hct (7/5/2019) = 9.3/29.2     Patient was empirically started on Piperacillin-Tazobactam IV 4.5 grams IV q 6 hr and Vancomycin IV (one dose only).    The patient was admitted under the service of [facility] (Dr. Joseph Hairston).     Excerpt (History of the Present Illness) from the H&P by Dr. Joseph Hairston:  \"Alessandro Meadows Jr. is a 80 y. o. male with hx of HLD, A Fib, SSS s/p pacemaker, Anemia, and L hip ORIF for periprosthetic femur fracture who presents to the ED on transfer [address] for evaluation of fever reported as 102.9.  He also complains of having symptoms of chest pain which began upon awakening in the a.m. [facility] is status post ORIF left hip for periprosthetic femur fracture on 6/25/2019 by Dr. Bruner of orthopedic surgery. [facility] hospital stay was complicated by a pulmonary embolism, and he has been placed on Coumadin therapy for this. [facility] was discharged from [facility] earlier in the day on 7/5/2019, and his wife states that he developed a fever shortly after arrival to the skilled nursing facility. [facility] denies any nausea, vomiting, abdominal pain, or diarrhea.  He has not had any associated shortness of breath.  He does have some lower extremity swelling which has been chronic.  He received 5 units PRBCs during his hospital stay for postop anemia felt to be due to acute blood loss. \"     WBC count (7/6/2019) = 29.2     Hgb/Hct (7/6/2019) = 8.4/26. 6     Cardiology consult (Dr. Ada Harmon) was called for evaluation and comanagement of elevated troponin level. The elevated troponin level was attributed to demand ischemia due to sepsis.     Orthopedics consult GILBERT Aceves) was called for evaluation and comanagement.     Excerpt (Assessment and Plan) from the Consult GILBERT Solis:  Apolinar Dempsey:              PORIOZ              Benign appearing left hip and femur now 11 days s/p ORIF periprosthetic hip fracture with Dr. Deniz Roy [facility])     Plan:                 Discussion:  Reassurance provided that hip does not appear to be involved.  If surgery ever becomes necessary, patient would request to transfer to [facility] for Dr. Bruner to treat. Rosa Diamond would agree with that but it does not appear that it will be necessary based on today's exam.  N[facility] LLE.  Ortho to sign-off                 Patient d/w Dr. Shelton and he agrees with the impression and plan of care. \"      Warfarin was continued during the patient's hospital stay.     WBC count (7/7/2019) = 18.9     Hgb/Hct (7/7/2019) = 8.0/25. 2     WBC count (7/8/2019) = 9.8     Hgb/Hct (7/8/2019) = 7.9/25. 2     Blood culture (7/5/2019; 1 out of 2 sets) yielded growth of Pseudomonas aeruginosa (PANSENSITIVE)     Nuclear medicine cardiac single-photon emission computerized tomography (SPECT) stress test (7/8/2019) showed no evidence of reversible myocardial ischemia; this is a low risk study.     On 7/8/2019, patient received Warfarin 3 mg PO.     INR (7/9/2019) = 2.9     On 7/9/2019, Piperacillin-Tazobactam IV was discontinued and patient was discharged from [facility] on oral Ciprofloxacin 500 mg PO q 12 hr x 4 days.     The patient had remained hemodynamically stable but due to the above events, the patient was noted to be generally weak and with impaired mobility and ADLs. Patient was felt to be a good candidate for acute inpatient rehabilitation. Upon evaluation by Physical Therapy and Occupational Therapy, the patient was recommended for acute inpatient rehabilitation. The patient was discharged and was subsequently admitted to the [facility] for intensive rehabilitation to help recover strength, function and mobility.       [address]: Upon admission to the [facility], the patient underwent physical therapy, occupational therapy and speech therapy. The patient was able to actively participate in the rehabilitation activities and progressed well. On discharge, the patient was able to perform the following activities:    1.  Occupational Therapy    ON ADMISSION ON DISCHARGE   Eating  Functional Level: 5   Eating  Functional Level: 7     Grooming  Functional Level: 5   Grooming  Functional Level: 6     Bathing  Functional Level: 3(supine/ HOB raised)   Bathing  Functional Level: 5     Upper Body Dressing  Functional Level: 5   Upper Body Dressing  Functional Level: 6     Lower Body Dressing  Functional Level: 3   Lower Body Dressing  Functional Level: 5(SBA) Toileting  Functional Level: 1   Toileting  Functional Level: 5     Toilet Transfers  Toilet Transfer Score: 3(using FWW)   Toilet Transfers  Toilet Transfer Score: 4     Tub /Shower Transfers  Tub/Shower Transfer Score: 0   Tub/Shower Transfers  Tub/Shower Transfer Score: 4       2. Physical Therapy    ON ADMISSION ON DISCHARGE   Wheelchair Mobility/Management  Able to Propel (ft): 150 feet  Functional Level: 4  Curbs/Ramps Assist Required (FIM Score): 0 (Not tested)  Wheelchair Setup Assist Required : 3 (Moderate assistance)  Wheelchair Management: Manages left brake, Manages right brake Wheelchair Mobility/Management  Able to Propel (ft): 250 feet  Functional Level: 6  Curbs/Ramps Assist Required (FIM Score): 5 (Supervision)  Wheelchair Setup Assist Required : 3 (Moderate assistance)  Wheelchair Management: Manages left brake, Manages right brake, Manages right footrest     Gait  Amount of Assistance: 1 (Dependent/total assistance)  Distance (ft): 0 Feet (ft)  Assistive Device: Walker, rolling Gait  Amount of Assistance: 4 (Contact guard assistance)  Distance (ft): 16 Feet (ft)(x 2 trials)  Assistive Device: Walker, rolling     Balance-Sitting/Standing  Sitting - Static: Good (unsupported)  Sitting - Dynamic: Good (unsupported)  Standing - Static: Poor  Standing - Dynamic : Impaired Balance-Sitting/Standing  Sitting - Static: Good (unsupported)  Sitting - Dynamic: Good (unsupported)  Standing - Static: Poor  Standing - Dynamic : Impaired     Bed/Mat Mobility  Rolling Right : 4 (Minimal assistance)  Rolling Left : 4 (Minimal assistance)  Supine to Sit : 3 (Moderate assistance)  Sit to Supine : 3 (Moderate assistance) Bed/Mat Mobility  Rolling Right : 4 (Minimal assistance)  Rolling Left : 4 (Minimal assistance)  Supine to Sit : 5 (Supervision)  Sit to Supine : 4 (Minimal assistance)     Transfers  Transfer Type:  Other  Other: stand step with RW  Transfer Assistance : 3 (Moderate assistance )  Sit to Stand Assistance: Moderate assistance  Car Transfers: Not tested  Car Type: N/A Transfers  Transfer Type: Other  Other: stand step with RW  Transfer Assistance : 4 (Contact guard assistance)  Sit to Stand Assistance: Contact guard assistance  Car Transfers: Minimum assistance  Car Type: SUV     Steps or Stairs  Steps/Stairs Ambulated (#): 0  Level of Assist : 0 (Not tested)  Rail Use: None Steps or Stairs  Steps/Stairs Ambulated (#): 0  Level of Assist : 0 (Not tested)  Rail Use: None       3. Speech and Language Pathology    ON ADMISSION ON DISCHARGE   Comprehension (Native Language)  Primary Mode of Comprehension: Auditory  Score: 5 Comprehension (Native Language)  Primary Mode of Comprehension: Auditory  Score: 7     Expression (Native Language)  Primary Mode of Expression: Verbal  Score: 5   Expression (Native Language)  Primary Mode of Expression: Verbal  Score: 7     Social Interaction/Pragmatics  Score: 5 Social Interaction/Pragmatics  Score: 6     Problem Solving  Score: 5   Problem Solving  Score: 6     Memory  Score: 5 Memory  Score: 6       Legend:   7 - Independent   6 - Modified Independent   5 - Standby Assistance / Supervision / Set-up   4 - Minimum Assistance / Contact Guard Assistance   3 - Moderate Assistance   2 - Maximum Assistance   1 - Total Assistance / Dependent       ACUTE MEDICAL ISSUES ADDRESSED IN INPATIENT REHABILITATION FACILITY:     > S/P Open reduction internal fixation of displaced subtrochanteric periprosthetic fracture around internal prosthetic left hip joint (6/25/2019 - Dr. John Aldrich);  History of displaced subtrochanteric periprosthetic fracture of left femur around internal prosthetic left hip joint; S/P Left total hip replacement (2/18/2019 - Dr. John Aldrich)              > Nonweightbearing on the left lower extremity              > Total hip precautions on left hip              > Staples were removed on 7/9/2019               > On 7/10/2019, started Calcium citrate 1 tab PO BID   > Continue Calcium citrate 1 tab PO BID     > Acute Postoperative Blood Loss Anemia              > Hgb/Hct (7/10/2019, on admission) = 8.8/28.0              > Anemia work-up showed serum iron 40, TIBC 237, iron % saturation 17, ferritin 568, reticulocyte count 5.0   > On 7/10/2019, started FeSO4 and Ascorbic acid   > Hgb/Hct (7/15/2019) = 9.3/29.5    > Hgb/Hct (7/18/2019) = 9.3/29.6    > Hgb/Hct (7/22/2019) = 9.6/31.4    > Continue:               > FeSO4 325 mg PO once daily with breakfast                > Ascorbic Acid 250 mg PO once daily with breakfast (to enhance the absorption of the FeSO4)      > Acute pulmonary embolism, anticoagulated on Warfarin              > Target INR = 2 to 3              > INR (7/23/2019) = 2.1              > Patient received Warfarin 6 mg PO on the night prior to discharge              > Patient will be discharged on:    > Warfarin 4 mg PO q PM of Sun-Tue-Thu-Sat    > Warfarin 5 mg PO q PM of Mon-Wed-Fri   > PT/INR monitoring postdischarge care of PCP (Dr. Rafael Garcia)   > Next PT/INR postdischarge to be done on 7/24/2019 with results sent to the office of Dr. Rafael Garcia                > Sepsis due to Pseudomonas aeruginosa              > Blood culture (7/5/2019; 1 out of 2 sets) yielded growth of PSeudomonas aeruginosa (PANSENSITIVE)              > On 7/5/2019, patient was empirically started on Piperacillin-Tazobactam 4.5 grams IV q 6 hr and a single dose of Vancomycin IV was given              > On 7/9/2019, Piperacillin-Tazobactam IV was discontinued and patient was discharged from [facility] on oral Ciprofloxacin 500 mg PO q 12 hr x 4 days              > On 7/13/2019, patient had completed the recommended 4-day treatment course of Ciprofloxacin 500 mg PO q 12 hr      > Vitamin D Deficiency              > Vitamin D 25-Hydroxy (7/10/2019) = 18.6              > On 7/10/2019, patient was given Cholecalciferol 50,000 units PO x 1 dose > On 7/11/2019, started Cholecalciferol 5,000 units PO once daily   > Continue Cholecalciferol 5,000 units PO once daily     > Increased urinary frequency   > Patient stated he has had increased urinary frequency even prior to admission to the acute care hospital; patient inquired of options of treatment for increased frequency / possible prostate issues; patient stated he tried Tamsulosin in the past with no reported adverse reactions   > On 7/17/2019, started a trial of Tamsulosin 0.4 mg PO once daily   > On 7/19/2019, discontinued Tamsulosin 0.4 mg PO once daily (re: per request of patient - possible cause of postural hypotension noted this AM with therapy)    > Difficulty sleeping   > On 7/17/2019, added Diphenhydramine 25 mg PO q HS PRN for sleep   > On 7/19/2019, discontinued Diphenhydramine 25 mg PO q HS PRN for sleep (re: per request of patient)    > Analgesia   > On 7/15/2019, added Diclofenac 1.3% transdermal patch, 1 patch on skin of left thigh q 12 hr   > Continue:               > Acetaminophen 650 mg PO q 4 hr PRN for pain level less than 5/10    > Diclofenac 1.3% transdermal patch, 1 patch on skin of left thigh q 12 hr               > Methocarbamol 500 mg PO q 8 hr                > Norco 5/325 1 tab PO q 4 hr PRN for pain level greater than 4/10        MEDICATIONS ON DISCHARGE:    Current Discharge Medication List      START taking these medications    Details   methocarbamol (ROBAXIN) 500 mg tablet Take 1 Tab by mouth every eight (8) hours. Indications: muscle spasm  Qty: 90 Tab, Refills: 0    Associated Diagnoses: Closed displaced subtrochanteric fracture of left femur with routine healing, subsequent encounter; Status post open reduction with internal fixation of fracture;  Acute pulmonary embolism without acute cor pulmonale, unspecified pulmonary embolism type (HCC)      HYDROcodone-acetaminophen (NORCO) 5-325 mg per tablet Take 1 Tab by mouth every four (4) hours as needed (for pain level greater than 4/10) for up to 7 days. Max Daily Amount: 6 Tabs. Indications: Pain  Qty: 28 Tab, Refills: 0    Associated Diagnoses: Periprosthetic fracture around internal prosthetic left hip joint, subsequent encounter; Closed displaced subtrochanteric fracture of left femur with routine healing, subsequent encounter; Status post open reduction with internal fixation of fracture      ferrous sulfate 325 mg (65 mg iron) tablet Take 1 Tab by mouth daily (with breakfast). Qty: 30 Tab, Refills: 0    Associated Diagnoses: Acute blood loss as cause of postoperative anemia      docusate sodium (COLACE) 100 mg capsule Take 1 Cap by mouth two (2) times a day. Indications: constipation  Qty: 60 Cap, Refills: 0      diclofenac (FLECTOR) 1.3 % pt12 1 Patch by TransDERmal route every twelve (12) hours for 15 days. Qty: 30 Patch, Refills: 0    Associated Diagnoses: Periprosthetic fracture around internal prosthetic left hip joint, subsequent encounter; Closed displaced subtrochanteric fracture of left femur with routine healing, subsequent encounter; Status post open reduction with internal fixation of fracture      cholecalciferol (VITAMIN D3) 5,000 unit capsule Take 1 Cap by mouth daily. Indications: vitamin D deficiency  Qty: 30 Cap, Refills: 0    Associated Diagnoses: Vitamin D deficiency      calcium citrate 200 mg (950 mg) tablet Take 1 Tab by mouth two (2) times a day. Qty: 60 Tab, Refills: 0    Associated Diagnoses: Closed displaced subtrochanteric fracture of left femur with routine healing, subsequent encounter; Status post open reduction with internal fixation of fracture      ascorbic acid, vitamin C, (VITAMIN C) 250 mg tablet Take 1 Tab by mouth daily (with breakfast). Qty: 30 Tab, Refills: 0    Associated Diagnoses: Acute blood loss as cause of postoperative anemia      acetaminophen (TYLENOL) 325 mg tablet Take 2 Tabs by mouth every four (4) hours as needed (for fever or pain level less than 5/10). Indications: fever, Pain  Qty: 60 Tab, Refills: 0    Associated Diagnoses: Periprosthetic fracture around internal prosthetic left hip joint, subsequent encounter; Closed displaced subtrochanteric fracture of left femur with routine healing, subsequent encounter; Status post open reduction with internal fixation of fracture      !! warfarin (COUMADIN) 4 mg tablet Take 1 Tab by mouth every Tuesday, Thursday, Saturday & Sunday. at bedtime. [Dose to be adjusted by Dr. Spike Bocanegra with target INR 2 to 3]  Qty: 10 Tab, Refills: 0    Associated Diagnoses: Acute pulmonary embolism without acute cor pulmonale, unspecified pulmonary embolism type ([address] Anticoagulated on warfarin      !! warfarin (COUMADIN) 5 mg tablet Take 1 Tab by mouth every Monday, Wednesday, Friday. at bedtime. [Dose to be adjusted by Dr. Spike Bocanegra with target INR 2 to 3]  Qty: 10 Tab, Refills: 0    Associated Diagnoses: Acute pulmonary embolism without acute cor pulmonale, unspecified pulmonary embolism type ([facility] [address] Anticoagulated on warfarin       !! - Potential duplicate medications found. Please discuss with provider. CONTINUE these medications which have NOT CHANGED    Details   simvastatin (ZOCOR) 10 mg tablet Take 10 mg by mouth nightly. STOP taking these medications       aspirin delayed-release 81 mg tablet Comments:   Reason for Stopping:               DISCHARGE VITAL SIGNS:  Visit Vitals  /65 (BP 1 Location: Right arm, BP Patient Position: Supine; At rest)   Pulse 65   Temp 97.4 °F (36.3 °C)   Resp 16   Ht 6' 2\" (1.88 m)   Wt 103.4 kg (228 lb)   SpO2 96%   BMI 29.27 kg/m²       DISCHARGE PHYSICAL EXAMINATION:  GENERAL SURVEY: Patient is awake, alert, oriented x 3, sitting comfortably on the chair, not in acute respiratory distress.   HEENT: pale palpebral conjunctivae, anicteric sclerae, no nasoaural discharge, moist oral mucosa  NECK: supple, no jugular venous distention, no palpable lymph nodes  CHEST/LUNGS: symmetrical chest expansion, good air entry, clear breath sounds  HEART: adynamic precordium, good S1 S2, no S3, regular rhythm, no murmurs  ABDOMEN: obese, bowel sounds appreciated, soft, non-tender  EXTREMITIES: pale nailbeds, no edema, full and equal pulses, no calf tenderness   NEUROLOGICAL EXAM: The patient is awake, alert and oriented x3, able to answer questions fairly appropriately, able to follow 1 and 2 step commands.  Able to tell time from the wall clock.  Cranial nerves II-XII are grossly intact.  No gross sensory deficit.  Motor strength is 4+/5 on BUE, 4 to 4+/5 on the RLE, 2-/5 on the LLE (except 3- on the left ankle).    Incision(s): healing well, clean, dry, and intact      CONDITION ON DISCHARGE: Stable. DISPOSITION: Patient clinically improved and was discharged to home with home health physical therapy, occupational therapy and skilled nursing. The patient is temporarily homebound secondary to functional deficits after undergoing an Open reduction internal fixation of displaced subtrochanteric periprosthetic fracture around internal prosthetic left hip joint (6/25/2019 - Dr. 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 Farhan West). He can ambulate using a rolling walker (see above). The patient would benefit from continued skilled physical therapy in order to improve independent functional mobility within the home with use of least restrictive device. The patient would also benefit from continued skilled occupational therapy in order to improve self care and functional mobility within the home with use of least restrictive device. Short-term skilled nursing is needed for medication reconciliation, disease education and PT/INR monitoring. PT/INR monitoring post-discharge care of PCP (Dr. Taylor Clayton).  Next PT/INR to be done on 7/24/2019 with results sent to the office of Dr. Adela Funk. FOLLOW-UP RECOMMENDATIONS:   Follow-up Information     Follow up With Specialties Details Why Contact Info    [facility]   Will provide a wheelchair, cusion and transfer board. [address] [facility]   [address] [facility], [address] [place] [address]   The treatment recommends that you use a bedside commode (also called a 3 in 1). This item is not covered by insurance and will be a private purchase. Hakan Elliott MD Orthopedic Surgery On 7/24/2019 Patient has an appointment scheduled with Orthopedic Dr. Bindu Cordova on July 24, 2019 @ 12:50pm. [address]  [address] [facility]. [place]       Eve Costa MD  On 8/2/2019 Patient has an appointment scheduled with PCP group on August 2, 2019 @ 3:00pm.  Patient will be seen by Dr. Fuad Cabrera. [address] [facility]            OTHER INSTRUCTIONS:  1. Diet.               > Specifications: Low saturated fat              > Solids (consistency): Regular               > Liquids (consistency): Thin   2. Activity. As tolerated. 3. Safety / fall precautions. 4. Weightbearing status. Nonweightbearing on the left lower extremity  5. Total hip precautions on left hip. TIME SPENT ON DISCHARGE ACTIVITIES: More than 30 minutes.       Signed:  Tiera Suazo MD    7/23/2019

## 2019-07-23 NOTE — PROGRESS NOTES
Sw has spoken with pt who states understanding of dc plans today. Pt states that he will be staying at the [facility] in [place]. Pt has been set up with [facility] per his FOC. Pt will have a wheelchair, cushion and transfer board from [facility] delivered to the room prior to dc. Pt and wife state understanding of recommendation to purchase a bedside commode. Pt has chosen to fill his medications at [facility].     No further needs are noted at this time.

## 2019-09-23 ENCOUNTER — HOSPITAL ENCOUNTER (OUTPATIENT)
Dept: PHYSICAL THERAPY | Age: 84
Discharge: HOME | End: 2019-09-23
Payer: MEDICARE

## 2019-09-23 PROCEDURE — 97162 PT EVAL MOD COMPLEX 30 MIN: CPT

## 2019-09-23 PROCEDURE — 97530 THERAPEUTIC ACTIVITIES: CPT

## 2019-09-23 NOTE — PROGRESS NOTES
PT DAILY TREATMENT NOTE 10-18    Patient Name: Julio Mitchell. Date:2019  : 1934  [x]  Patient  Verified  Payor: Margaret Jean Baptiste / Plan: [place] MEDICARE PART A & B / Product Type: Medicare /    In time:2:55  Out time:3:35  Total Treatment Time (min): 40  Visit #: 1 of 10      Medicare/BCBS Only   Total Timed Codes (min):  23 1:1 Treatment Time:  40       Treatment Area: Left hip pain [M25.552]     SUBJECTIVE  Pain Level (0-10 scale): 7/10  Any medication changes, allergies to medications, adverse drug reactions, diagnosis change, or new procedure performed?: [x] No    [] Yes (see summary sheet for update)  Subjective functional status/changes:   [x] See paper initial evaluation form in patient chart. OBJECTIVE    17 min [x]Eval                  []Re-Eval     23 min Therapeutic Activity:  []  See flow sheet : diagnosis; prognosis; HEP modification; hip precautions review; benefits of aquatics; safety with gait using RW, Rollator at home   Rationale: increase strength and improve coordination  to improve the patients ability to navigate safely in home; prevent compensatory patterns that could lead to further injury    With   [] TE   [x] TA   [] neuro   [] other: Patient Education: [x] Review HEP    [] Progressed/Changed HEP based on:   [] positioning   [] body mechanics   [] transfers   [] heat/ice application    [] other:      Other Objective/Functional Measures: FOTO next visit     Pain Level (0-10 scale) post treatment: 7/10    ASSESSMENT/Changes in Function:     Patient will continue to benefit from skilled PT services to address functional mobility deficits, address ROM deficits, address strength deficits, analyze and address soft tissue restrictions, analyze and cue movement patterns, analyze and modify body mechanics/ergonomics, assess and modify postural abnormalities, address imbalance/dizziness and instruct in home and community integration to attain remaining goals.      [x]  See Plan of Care  []  See progress note/recertification  []  See Discharge Summary         PLAN  []  Upgrade activities as tolerated     [x]  Continue plan of care  []  Update interventions per flow sheet       []  Discharge due to:_  []  Other:_      Citlali Brown, PT 9/23/2019  4:02 PM

## 2019-09-23 NOTE — PROGRESS NOTES
[facility], [address] [place] [address]  [phone] fax  Plan of Care/ Statement of Necessity for Physical Therapy Services    Patient name: Killian Heredia Start of Care: 2019   Referral source: Mohamud Oleary MD : 1934    Medical Diagnosis: Left hip pain [M25.552]  Payor: VA MEDICARE / Plan: VA MEDICARE PART A & B / Product Type: Medicare /  Onset Date:19    Treatment Diagnosis: Left Hip Pain   Prior Hospitalization: see medical history Provider#: [id number]   Medications: Verified on Patient summary List    Comorbidities: Arthritis; Heart disease; Pacemaker; left THR 19; Left femoral fx repair 19; hx pulmonary embolism 2019   Prior Level of Function: Retired; lives with spouse; was ambulating without A.D; functionally independent      The Plan of Care and following information is based on the information from the initial evaluation. Assessment/ key information: Pt is a 80 y.o. male who presents with c/o left hip pain, weakness, and standing imbalance, leading to falls within the last three months, the last of which was 6 days ago. Pt has hx of left THR that was healing but Pt had a fall on 19, resulting in left femoral fx that was repaired surgically. Pt was in hospital for surgical recovery and after suffering pulmonary embolism and sepsis on 19. Pt has since had two other falls that resulted inferior shifting of prosthesis into femur and changing leg length, right ankle EV of 45 deg. Pt is now cleared to be WBAT and uses RW and Rollator in home for short distances but wheelchair for community negotiation. Pt has difficulty with all transfers and bed mobility, requires use of shower chair to bathe, and requires assistance with dressing, donning socks and shoes.   Upon exam, Pt exhibited limited left hip flex AROM of 45 deg compared to 70 deg right; limited left knee AROM of 0-100 deg compared to 0-129 deg right; palpable tenderness, tightness in left quadriceps, TFL, ITB, HS; inability to perform left SLR without increased compensations; and Trendelenberg pattern and left hip ER with gait using RW with CGA. Pt would benefit from skilled PT in aquatic therapy to address above deficits to improve Pt's independent function and safe gait in home without falls. Evaluation Complexity History HIGH Complexity :3+ comorbidities / personal factors will impact the outcome/ POC ; Examination MEDIUM Complexity : 3 Standardized tests and measures addressing body structure, function, activity limitation and / or participation in recreation  ;Presentation MEDIUM Complexity : Evolving with changing characteristics  ; Clinical Decision Making MEDIUM Complexity : FOTO score of 26-74  Overall Complexity Rating: MEDIUM  Problem List: pain affecting function, decrease ROM, decrease strength, edema affecting function, impaired gait/ balance, decrease ADL/ functional abilitiies, decrease activity tolerance, decrease flexibility/ joint mobility and decrease transfer abilities   Treatment Plan may include any combination of the following: Therapeutic exercise, Therapeutic activities, Neuromuscular re-education, Physical agent/modality, Gait/balance training, Manual therapy, Aquatic therapy, Patient education, Functional mobility training, Home safety training and Stair training  Patient / Family readiness to learn indicated by: asking questions, trying to perform skills and interest  Persons(s) to be included in education: patient (P) and family support person (FSP);list spouse  Barriers to Learning/Limitations: None  Patient Goal (s): Better range of motion.   Patient Self Reported Health Status: good  Rehabilitation Potential: good    Short Term Goals: To be accomplished in 1 weeks:  Goal: Pt to initiate aquatics program without increased pain to aid in achievement of all LTGs.   Status at last note/certification: N/A    Long Term Goals: To be accomplished in 5 weeks:  Goal: Pt to improve left hip flex AROM to 70 deg without increased pain to increase independence with transfers into/out of car. Status at last note/certification: 45 deg left hip flex  Goal: Pt to increase left hip strength to 3/5 to show improved ROM and work towards independent bed mobility. Status at last note/certification: 2/5 grossly  Goal: Pt to report < 5/10 pain at worst to be able to increase independence with bathing and dressing. Status at last note/certification: 95/67 pain at worst  Goal: Pt to increase FOTO score to 51 pts to show improved function and quality of life. Status at last note/certification: FOTO 35 pts    Frequency / Duration: Patient to be seen 2 times per week for 5 weeks. Patient/ Caregiver education and instruction: Diagnosis, prognosis, exercises   [x]  Plan of care has been reviewed with PTA    Certification Period: 9/23/19 - 10/22/19  Jeny Brown, PT 9/23/2019 4:15 PM  _____________________________________________________________________  I certify that the above Therapy Services are being furnished while the patient is under my care. I agree with the treatment plan and certify that this therapy is necessary.     Physician's Signature:____________Date:_________TIME:________    ** Signature, Date and Time must be completed for valid certification **    Please sign and return to [facility] [address]  [phone] fax

## 2019-10-01 ENCOUNTER — HOSPITAL ENCOUNTER (OUTPATIENT)
Dept: PHYSICAL THERAPY | Age: 84
Discharge: HOME | End: 2019-10-01
Payer: MEDICARE

## 2019-10-01 PROCEDURE — 97113 AQUATIC THERAPY/EXERCISES: CPT

## 2019-10-03 ENCOUNTER — HOSPITAL ENCOUNTER (OUTPATIENT)
Dept: PHYSICAL THERAPY | Age: 84
Discharge: HOME | End: 2019-10-03
Payer: MEDICARE

## 2019-10-03 PROCEDURE — 97113 AQUATIC THERAPY/EXERCISES: CPT

## 2019-10-08 ENCOUNTER — HOSPITAL ENCOUNTER (OUTPATIENT)
Dept: PHYSICAL THERAPY | Age: 84
Discharge: HOME | End: 2019-10-08
Payer: MEDICARE

## 2019-10-08 PROCEDURE — 97113 AQUATIC THERAPY/EXERCISES: CPT

## 2019-10-10 ENCOUNTER — HOSPITAL ENCOUNTER (OUTPATIENT)
Dept: PHYSICAL THERAPY | Age: 84
Discharge: HOME | End: 2019-10-10
Payer: MEDICARE

## 2019-10-10 PROCEDURE — 97113 AQUATIC THERAPY/EXERCISES: CPT

## 2019-10-15 ENCOUNTER — HOSPITAL ENCOUNTER (OUTPATIENT)
Dept: PHYSICAL THERAPY | Age: 84
Discharge: HOME | End: 2019-10-15
Payer: MEDICARE

## 2019-10-15 PROCEDURE — 97113 AQUATIC THERAPY/EXERCISES: CPT

## 2019-10-17 ENCOUNTER — HOSPITAL ENCOUNTER (OUTPATIENT)
Dept: PHYSICAL THERAPY | Age: 84
Discharge: HOME | End: 2019-10-17
Payer: MEDICARE

## 2019-10-17 PROCEDURE — 97113 AQUATIC THERAPY/EXERCISES: CPT

## 2019-10-22 ENCOUNTER — HOSPITAL ENCOUNTER (OUTPATIENT)
Dept: PHYSICAL THERAPY | Age: 84
Discharge: HOME | End: 2019-10-22
Payer: MEDICARE

## 2019-10-22 PROCEDURE — 97113 AQUATIC THERAPY/EXERCISES: CPT

## 2019-10-22 NOTE — PROGRESS NOTES
[facility], [address] [place] [address]  [phone] fax    Discharge Summary      Patient name: Jammie Hidalgo. Start of Care: 19   Referral source: Amarilis Barone MD : 1934   Medical/Treatment Diagnosis: Left hip pain [M25.552]  Payor: VA MEDICARE / Plan: VA MEDICARE PART A & B / Product Type: Medicare /  Onset Date:19     Prior Hospitalization: see medical history Provider#: [id number]   Medications: Verified on Patient Summary List          Comorbidities: Arthritis; Heart disease; Pacemaker; left THR 19; Left femoral fx repair 19; hx pulmonary embolism 2019   Prior Level of Function: Retired; lives with spouse; was ambulating without A.D; functionally independent      Visits from Start of Care: 8       Missed Visits: 0  Reporting Period : 19 to 10/22/19    Subjective: Pt reports that aquatic therapy has improved his walking tolerance. When he started therapy he was confined to a wheelchair for the majority of his functional mobility, he is now able to walk with his RW for several minutes at a time. He has noted improved strength in his hip, and an improved ability to lift the leg although he continues to have to assist with car and tub transfers. He has not had any falls since beginning therapy. He has also been able to significantly dec the amount of prescription pain medication he was taking. Prior to hterpay he took 2 rx pain pills per day, and now he takes tylenol once per day. He is doing well, and is planning to start his self guided post rehab program here at the pool     Short Term Goals: To be accomplished in 1 weeks:  Goal: Pt to initiate aquatics program without increased pain to aid in achievement of all LTGs. Status at last note/certification: N/A  Status at discharge: met     Long Term Goals:  To be accomplished in 5 weeks:  Goal: Pt to improve left hip flex AROM to 70 deg without increased pain to increase independence with transfers into/out of car. Status at last note/certification: 45 deg left hip flex  Status at discharge: met    Goal: Pt to increase left hip strength to 3/5 to show improved ROM and work towards independent bed mobility. Status at last note/certification: 2/5 grossly  Status at discharge: not met , Pt demonstrates 2+/5 strength, which will likely progress with adherence to HEP    Goal: Pt to report < 5/10 pain at worst to be able to increase independence with bathing and dressing. Status at last note/certification: 50/44 pain at worst  Status at discharge: not met, However, Pt has been able to dec prescription pain medication indicating progress. Goal: Pt to increase FOTO score to 51 pts to show improved function and quality of life. Status at last note/certification: FOTO 35 pts  Status at discharge: not met, progressing FOTO 38 pts        Assessment/ Summary of Care: Pt demonstrates improved strength and ROM which have in turn likely contributed to Pt overall functional gains as evidenced by improved FOTO score.      RECOMMENDATIONS:  [x]Discontinue therapy: [x]Patient has reached or is progressing toward set goals      []Patient is non-compliant or has abdicated      []Due to lack of appreciable progress towards set goals    Nino Nam 10/22/2019 9:38 AM

## 2019-10-22 NOTE — PROGRESS NOTES
Physical Therapy Discharge Instructions      [facility]EVEL[facility], [address] [place] [address]  [phone] fax    Patient: Barbara Smith. : 1934      Continue Home Exercise Program 2 times a week for 6 weeks.       Follow up with MD:     [] Upon completion of therapy     [x] As needed      Recommendations:     [x]   Return to activity with home program    []   Return to activity with the following modifications:       [x]Post Rehab Program    []Join Independent aquatic program     []Return to/join local gym        Additional Comments:           Ronald Rodrigues, BRIAN 10/22/2019 10:36 AM

## 2019-10-24 ENCOUNTER — APPOINTMENT (OUTPATIENT)
Dept: PHYSICAL THERAPY | Age: 84
End: 2019-10-24
Payer: MEDICARE

## 2019-10-29 ENCOUNTER — APPOINTMENT (OUTPATIENT)
Dept: PHYSICAL THERAPY | Age: 84
End: 2019-10-29
Payer: MEDICARE

## 2019-10-31 ENCOUNTER — APPOINTMENT (OUTPATIENT)
Dept: PHYSICAL THERAPY | Age: 84
End: 2019-10-31
Payer: MEDICARE

## 2022-03-11 NOTE — PROGRESS NOTES
Problem: Self Care Deficits Care Plan (Adult)  Goal: *Therapy Goal (Edit Goal, Insert Text)  Description  Occupational Therapy Goals   Short Term Goals= Long Term Goals  Initiated 7/10/19 and to be accomplished within 2 week(s) (Updated 2019)  1. Pt will perform self-feeding with I. ()  2. Pt will perform grooming with S. (Upgrade to [facility])  3. Pt will perform UB bathing with S. (Upgrade to [facility])  4. Pt will perform LB bathing with S. ()  5. Pt will perform tub/shower transfer with S.   6. Pt will perform UB dressing with S. (Upgrade to [facility])  7. Pt will perform LB dressing with S.  8. Pt will perform toileting task with [facility]. (Upgrade to [facility])  9. Pt will perform toilet transfer with S.     Short Term Goals   Initiated 7/10/19 and to be accomplished within 7 day(s)  (Updated 2019)  1. Pt will perform self-feeding with S. ()  2. Pt will perform grooming with S. ()  3. Pt will perform UB bathing with S. ()  4. Pt will perform LB bathing with [place]. ()  5. Pt will perform tub/shower transfer with [facility]. ()  6. Pt will perform UB dressing with S. ()  7. Pt will perform LB dressing with [place]. ()  8. Pt will perform toileting task with [place]. ()  9. Pt will perform toilet transfer with [facility] ()        Outcome: Resolved/Not Met  OCCUPATIONAL THERAPY DISCHARGE    Patient: Mauricio Renee  (80 y.o. male)  Date: 2019    First Tx Session  Time In: 0800  Time Out[de-identified] 0950    Second Tx Session  Time In: 1030  Time Out[de-identified] 1100    Primary Diagnosis: Status post open reduction with internal fixation of fracture [Z96.7, Z87.81] Status post open reduction with internal fixation of fracture    Precautions: NWB Left LE, Total hip precautions Left LE        Barriers to Learning/Limitations: None  Compensate with: visual, verbal, tactile, kinesthetic cues/model     Patient identified with name and : yes    SUBJECTIVE:   Patient stated The student is only as good as his teacher, thank you.    Pt/spouse relationship where spouse assists patient within her means (donning socks, set-up for LB dressing, etc). Spouse agreeable to allow pt to perform as much I'ly in today's session with understanding upon d/c spouse can assist as much as they both agree on. OBJECTIVE DATA SUMMARY:   Pt approached for 1st portion of d/c sitting in w/c in room with spouse present. Spouse performing pt's functional transfers for shower this AM with OT supervision. Pt performed ambulation with RW from room<>bathroom with SBA overall and increased time. Pt's vitals assessed after shower due to pt having hx of low BP in recent days (see below). Ended session in dining room for breakfast with spouse present for meal.     Patient Vitals for the past 8 hrs:   Temp Pulse Resp BP SpO2   07/22/19 0833  77  126/82 98 %     Pt approached for 2nd session with pt in w/c, pt agreeable. Performed donning (B) socks with sock aide and donned shoes with shoe horn. Pt ended session with asking any final questions for OT and preparing pt for next steps [facility]) with pt stating no further questions at end of session. Pt performed toilet>recliner chair transfer with RW with increased time and SBA.      Past Medical History:   Diagnosis Date    Acute blood loss as cause of postoperative anemia 6/25/2019    Acute pulmonary embolism ([address] 7/1/2019    Pulmonary emboli at the periphery of the right main pulmonary artery extending into the middle and lower lobe branches    Anticoagulated on warfarin 7/1/2019    Benign prostatic hyperplasia with lower urinary tract symptoms     Demand ischemia of myocardium ([address] 7/6/2019 4/16/18 NST EF 59% , small, reversible apical defect 7/2/19 ECHO EF 68%, LVH 14/16, LAE, RVE, ANDREY 1.4cm2 with 12mm mean gradient    Diverticulosis     Dyslipidemia     Essential tremor     Gastroesophageal reflux disease     History of pericarditis     Obesity, Class I, BMI 30-34.9     Paroxysmal atrial fibrillation [facility])     CVAL Maddy Saleh FNP 2008  AF ablation 9/11/13 EPS with isolation of RSPV and LIPV 6/4/18 TEDDY  EF 55%, 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 male recently admitted for femur fracturesent from skilled nursing facility for fever to 102.9. Patient was recently seen at [facility] for hip replacement status post fall. Patient endorses retrosternal chest pain that started upon waking this morning has been persistent since without propagating or palliating factors.   Patient also endorses increased bilateral lower extremity s    Sick sinus syndrome ([address]     Vitamin D deficiency 7/10/2019    Vitamin D 25-Hydroxy (7/10/2019) = 18.6      Past Surgical History:   Procedure Laterality Date    HX AFIB ABLATION      2x    HX CATARACT REMOVAL Bilateral     HX CHOLECYSTECTOMY      HX CRANIOTOMY  2015    Subdural hematoma    HX CRANIOTOMY  2016    Subdural hematoma    HX HIP FRACTURE TX Left 06/25/2019    S/P Open reduction internal fixation of displaced subtrochanteric periprosthetic fracture around internal prosthetic left hip joint (6/25/2019 - Dr. Fidelina Vasquez)    HX HIP REPLACEMENT Left 02/18/2019    S/P Left total hip replacement (2/18/2019 - Dr. Fidelina Vasquez)    HX KNEE REPLACEMENT Right 2002    HX KNEE REPLACEMENT Left 2000    HX ORTHOPAEDIC      S/P Surgery of L4L5    HX OTHER SURGICAL      S/P Placement of Watchman device for atrial fibrillation    HX PACEMAKER  2010    HX SHOULDER ARTHROSCOPY Bilateral     HX VEIN STRIPPING Left     1980's    MD ANALYZ NEUROSTIM BRAIN, EACH ADD 27 MIN      brain surgery 2015     Prior Level of Function/Home Situation: Pt reporting MI in ADL/IADLs with use of cane prn after hip replacement  Home Situation  Home Environment: Private residence  # Steps to Enter: 3  Rails to Enter: No  Wheelchair Ramp: No  One/Two Story Residence: One story  Living Alone: No  Support Systems: Spouse/Significant Other/Partner  Patient Expects to be Discharged to[de-identified] Private residence  Current DME Used/Available at Home: Walker, rolling, Bettye Abdalla, straight, Bettye Abdalla, quad  Tub or Shower Type: Shower  [x]     Right hand dominant   []     Left hand dominant      Pain:  Pt reports 5/10 pain or discomfort prior to treatment. Pt reports 5-6/10 pain or discomfort post treatment. *Nursing provided pain patch and medicine during 1st tx. Pt reporting increased pain 2/2 to practicing car transfers and increased activity out put.      Problem List:    Decreased strength B UE  [x]     Decreased strength trunk/core  [x]     Decreased AROM   []     Decreased PROM  []     Decreased balance sitting  []     Decreased balance standing  [x]     Decreased endurance  [x]     Pain  [x]       Functional Limitations:   Decreased independence with ADL  [x]     Decreased independence with functional transfers  [x]     Decreased independence with ambulation  [x]     Decreased independence with IADL  [x]       Outcome Measures: FIM, MMT                  MMT Initial Assessment    Right Upper Extremity  Left Upper Extremity    UE AROM WFL; MMT 4/5 grossly WFL; MMT 4/5 grossly   Shoulder flexion       Shoulder extension       Shoulder ABDuction       Shoulder ADDUction       Elbow Flexion       Elbow Extension       Wrist Extension/Flexion                  MMT Discharge Assessment   Right Upper Extremity  Left Upper Extremity    UE AROM WFL; MMT 4/5 grossly  WFL; MMT 4/5 grossly    Shoulder flexion     Shoulder extension     Shoulder ABDuction     Shoulder ADDUction     Elbow Flexion     Elbow Extension     Wrist Extension/Flexion              0/5 No palpable muscle contraction  1/5 Palpable muscle contraction, no joint movement  2-/5 Less than full range of motion in gravity eliminated position  2/5 Able to complete full range of motion in gravity eliminated position  2+/5 Able to initiate movement against gravity  3-/5 More than half but not full range of motion against gravity  3/5 Able to complete full range of motion against gravity  3+/5 Completes full range of motion against gravity with minimal resistance  4-/5 Completes full range of motion against gravity with minimal resistance  4/5 Completes full range of motion against gravity with moderate resistance  5/5 Completes full range of motion against gravity with maximum resistance    Coordination: Impaired 2/2 to intention tremors noted during digit:digit coordination   Sensation: Intact     FIM SCORES Initial Assessment Discharge Assessment   Eating 5 Feeding/Eating  Feeding/Eating Assistance: 7 (Independent)   Grooming 5 Grooming  Grooming Assistance : 6 (Modified independent)  Comments: Performed washing face seated on tub bench w/ MI. Spouse assisted pt to comb hair per spouse/pt relationship dynamic. Oral Hygiene FIM: 6 (per reference to other grooming tasks)    Bathing 3(supine/ HOB raised) [address], Upper: 6 (Modified independent)  Position Performed: Seated in chair; Other (comment)(TTB)  Adaptive Equipment: Long handled sponge; Tub bench  Lower Body Bathing  Bathing Assistance, Lower : 5 (Stand-by assistance)  Adaptive Equipment: Grab bar;Long handled sponge  Position Performed: Seated in chair;Standing  Adaptive Equipment: Tub bench  Comments: Performed washing buttocks in stand with grab bar asst for L UE. Upper Body Dressing 5 Upper Body Dressing   Dressing Assistance : 6 (Modified independent)   Lower Body Dressing 3 Lower Body Dressing   Dressing Assistance : 5 (Stand-by assistance)  Leg Crossed Method Used: No  Position Performed: Seated in chair;Standing  Adaptive Equipment Used: Reacher;Walker  Comments: Pt used 1x UE at a time (other stabilized on RW)  to esther pants over hips.  Pt used reacher to esther LB clothing up to thighs prior to stand with increased time for success. Sock and/or Shoe Management FIM: 5 (set-up)   Toileting 1  5 (SBA). Per DRY toileting simulation per pt requesting not having to go during tx. Tub/Shower Transfer 0 Tub/Shower Transfer Score: 4   Toilet Transfer 3(using FWW) Functional Transfers  Tub or Shower Type: Shower  Amount of Assistance Required: 4 (Contact guard assistance)  Adaptive Equipment: Grab bars; Tub transfer bench;Walker (comment)(RW<>TTB)   Comprehension 5 Score: 7   Expression 5 Score: 7   Social Interaction 5 Score: 6   Problem Solving 5 Score: 6   Memory 5 Score: 6   Please see C Interdisciplinary Eval: Coordination/Balance Section for details regarding FIM score description. Activity Tolerance:   Fair, pt requires cues to engage in rest breaks and energy conservation t/o sessions. Pt at times attempting to exert more than his means and visibly appearing fatigued though not reporting when prompted. ASSESSMENT:  Pt demo'ed good progression towards OT POC as evidenced by meeting all STGs and 6/9x LTGs. Pt currently requiring CGA for toilet/shower transfers 2/2 to safety. Pt able to utilize AE to perform LB dressing/bathing, but reports preference for spouse to assist where she can (per pt/spouse relationship dynamic). Pt currently requires min cues for not crossing midline during sessions, but has good awareness of maintaining other hip precautions. Pt is very motivated to return to PLOF. Progression toward goals:  [x]      Improving appropriately and progressing toward goals  []      Improving slowly and progressing toward goals  []      Not making progress toward goals and plan of care will be adjusted     PLAN:  Pt would benefit from continued skilled occupational therapy in order to improve ADL function and IADL with use of least restrictive device.  Interventions may include ADL/IADL independence training, functional transfer training, standing balance task, standing endurance tasks (pt currently able to stand x 6 minutes w/o RB), and UB strengthening. Discharge Recommendations: Home Health  Further Equipment Recommendations for Discharge: bedside commode and N/A       Please refer to the flow sheet for vital signs taken during this treatment. After treatment:   [x]  Patient left in no apparent distress sitting up in recliner chair  [x]  Call bell left within reach    COMMUNICATION/EDUCATION:   Communication/Collaboration:  [x]      Home safety education was provided and the patient/caregiver indicated understanding. [x]      Patient/family have participated as able and agree with findings and recommendations. []      Patient is unable to participate in plan of care at this time.     Makayla Berg, OTR/L  7/22/2019 yes